# Patient Record
Sex: MALE | Race: WHITE | Employment: OTHER | URBAN - METROPOLITAN AREA
[De-identification: names, ages, dates, MRNs, and addresses within clinical notes are randomized per-mention and may not be internally consistent; named-entity substitution may affect disease eponyms.]

---

## 2017-01-05 RX ORDER — ATENOLOL AND CHLORTHALIDONE TABLET 100; 25 MG/1; MG/1
1 TABLET ORAL DAILY
Qty: 90 TABLET | Refills: 1 | Status: SHIPPED | OUTPATIENT
Start: 2017-01-05 | End: 2017-06-06 | Stop reason: SDUPTHER

## 2017-01-05 RX ORDER — LISINOPRIL 10 MG/1
10 TABLET ORAL DAILY
Qty: 90 TABLET | Refills: 1 | Status: SHIPPED | OUTPATIENT
Start: 2017-01-05 | End: 2017-06-06 | Stop reason: SDUPTHER

## 2017-01-12 ENCOUNTER — OFFICE VISIT (OUTPATIENT)
Dept: FAMILY MEDICINE CLINIC | Age: 77
End: 2017-01-12

## 2017-01-12 VITALS
HEART RATE: 59 BPM | SYSTOLIC BLOOD PRESSURE: 140 MMHG | TEMPERATURE: 98 F | OXYGEN SATURATION: 97 % | BODY MASS INDEX: 37.92 KG/M2 | DIASTOLIC BLOOD PRESSURE: 68 MMHG | WEIGHT: 256 LBS | HEIGHT: 69 IN

## 2017-01-12 DIAGNOSIS — H26.9 CATARACTS, BILATERAL: Primary | ICD-10-CM

## 2017-01-12 DIAGNOSIS — Z01.818 PRE-OP EXAM: ICD-10-CM

## 2017-01-12 PROCEDURE — 99213 OFFICE O/P EST LOW 20 MIN: CPT | Performed by: FAMILY MEDICINE

## 2017-01-14 ASSESSMENT — ENCOUNTER SYMPTOMS
ANAL BLEEDING: 0
BLOOD IN STOOL: 0
EYE DISCHARGE: 0
CONSTIPATION: 0
COUGH: 0
DIARRHEA: 0
ABDOMINAL DISTENTION: 0
ABDOMINAL PAIN: 0
SHORTNESS OF BREATH: 0
CHEST TIGHTNESS: 0

## 2017-01-18 ENCOUNTER — TELEPHONE (OUTPATIENT)
Dept: FAMILY MEDICINE CLINIC | Age: 77
End: 2017-01-18

## 2017-06-06 ENCOUNTER — OFFICE VISIT (OUTPATIENT)
Dept: FAMILY MEDICINE CLINIC | Age: 77
End: 2017-06-06

## 2017-06-06 VITALS
SYSTOLIC BLOOD PRESSURE: 134 MMHG | HEART RATE: 64 BPM | WEIGHT: 252 LBS | DIASTOLIC BLOOD PRESSURE: 76 MMHG | BODY MASS INDEX: 37.21 KG/M2 | OXYGEN SATURATION: 97 %

## 2017-06-06 DIAGNOSIS — I10 ESSENTIAL HYPERTENSION: ICD-10-CM

## 2017-06-06 DIAGNOSIS — R73.03 PRE-DIABETES: Primary | ICD-10-CM

## 2017-06-06 DIAGNOSIS — E78.5 HYPERLIPIDEMIA, UNSPECIFIED HYPERLIPIDEMIA TYPE: ICD-10-CM

## 2017-06-06 LAB
A/G RATIO: 1.6 (ref 1.1–2.2)
ALBUMIN SERPL-MCNC: 4.5 G/DL (ref 3.4–5)
ALP BLD-CCNC: 82 U/L (ref 40–129)
ALT SERPL-CCNC: 18 U/L (ref 10–40)
ANION GAP SERPL CALCULATED.3IONS-SCNC: 17 MMOL/L (ref 3–16)
AST SERPL-CCNC: 16 U/L (ref 15–37)
BILIRUB SERPL-MCNC: 1.3 MG/DL (ref 0–1)
BUN BLDV-MCNC: 15 MG/DL (ref 7–20)
CALCIUM SERPL-MCNC: 9.5 MG/DL (ref 8.3–10.6)
CHLORIDE BLD-SCNC: 96 MMOL/L (ref 99–110)
CO2: 26 MMOL/L (ref 21–32)
CREAT SERPL-MCNC: 0.6 MG/DL (ref 0.8–1.3)
GFR AFRICAN AMERICAN: >60
GFR NON-AFRICAN AMERICAN: >60
GLOBULIN: 2.9 G/DL
GLUCOSE BLD-MCNC: 105 MG/DL (ref 70–99)
POTASSIUM SERPL-SCNC: 3.7 MMOL/L (ref 3.5–5.1)
SODIUM BLD-SCNC: 139 MMOL/L (ref 136–145)
TOTAL PROTEIN: 7.4 G/DL (ref 6.4–8.2)

## 2017-06-06 PROCEDURE — G8510 SCR DEP NEG, NO PLAN REQD: HCPCS | Performed by: FAMILY MEDICINE

## 2017-06-06 PROCEDURE — 99214 OFFICE O/P EST MOD 30 MIN: CPT | Performed by: FAMILY MEDICINE

## 2017-06-06 PROCEDURE — 36415 COLL VENOUS BLD VENIPUNCTURE: CPT | Performed by: FAMILY MEDICINE

## 2017-06-06 PROCEDURE — 3288F FALL RISK ASSESSMENT DOCD: CPT | Performed by: FAMILY MEDICINE

## 2017-06-06 RX ORDER — ATORVASTATIN CALCIUM 40 MG/1
40 TABLET, FILM COATED ORAL DAILY
Qty: 30 TABLET | Refills: 6 | Status: SHIPPED | OUTPATIENT
Start: 2017-06-06 | End: 2017-07-11 | Stop reason: SDUPTHER

## 2017-06-06 RX ORDER — ATENOLOL AND CHLORTHALIDONE TABLET 100; 25 MG/1; MG/1
1 TABLET ORAL DAILY
Qty: 90 TABLET | Refills: 1 | Status: SHIPPED | OUTPATIENT
Start: 2017-06-06 | End: 2017-12-06 | Stop reason: SDUPTHER

## 2017-06-06 RX ORDER — LISINOPRIL 10 MG/1
10 TABLET ORAL DAILY
Qty: 90 TABLET | Refills: 1 | Status: SHIPPED | OUTPATIENT
Start: 2017-06-06 | End: 2017-12-06 | Stop reason: SDUPTHER

## 2017-06-06 ASSESSMENT — ENCOUNTER SYMPTOMS
COUGH: 0
DIARRHEA: 0
ANAL BLEEDING: 0
SHORTNESS OF BREATH: 0
ABDOMINAL PAIN: 0
ABDOMINAL DISTENTION: 0
EYE DISCHARGE: 0
BLOOD IN STOOL: 0
CONSTIPATION: 0
CHEST TIGHTNESS: 0

## 2017-06-06 ASSESSMENT — PATIENT HEALTH QUESTIONNAIRE - PHQ9
2. FEELING DOWN, DEPRESSED OR HOPELESS: 0
SUM OF ALL RESPONSES TO PHQ QUESTIONS 1-9: 0
SUM OF ALL RESPONSES TO PHQ9 QUESTIONS 1 & 2: 0
1. LITTLE INTEREST OR PLEASURE IN DOING THINGS: 0

## 2017-06-07 LAB
ESTIMATED AVERAGE GLUCOSE: 111.2 MG/DL
HBA1C MFR BLD: 5.5 %

## 2017-06-07 RX ORDER — LISINOPRIL 10 MG/1
TABLET ORAL
Qty: 90 TABLET | Refills: 0 | OUTPATIENT
Start: 2017-06-07

## 2017-06-12 ENCOUNTER — OFFICE VISIT (OUTPATIENT)
Dept: DERMATOLOGY | Age: 77
End: 2017-06-12

## 2017-06-12 DIAGNOSIS — L57.0 ACTINIC KERATOSIS OF RIGHT TEMPLE: Primary | ICD-10-CM

## 2017-06-12 DIAGNOSIS — L82.0 INFLAMED SEBORRHEIC KERATOSIS: ICD-10-CM

## 2017-06-12 DIAGNOSIS — Z12.83 SCREENING EXAM FOR SKIN CANCER: ICD-10-CM

## 2017-06-12 PROCEDURE — 17110 DESTRUCTION B9 LES UP TO 14: CPT | Performed by: DERMATOLOGY

## 2017-06-12 PROCEDURE — 17000 DESTRUCT PREMALG LESION: CPT | Performed by: DERMATOLOGY

## 2017-06-12 PROCEDURE — 99213 OFFICE O/P EST LOW 20 MIN: CPT | Performed by: DERMATOLOGY

## 2017-07-10 ENCOUNTER — TELEPHONE (OUTPATIENT)
Dept: FAMILY MEDICINE CLINIC | Age: 77
End: 2017-07-10

## 2017-07-11 RX ORDER — ATORVASTATIN CALCIUM 40 MG/1
40 TABLET, FILM COATED ORAL DAILY
Qty: 90 TABLET | Refills: 0 | Status: SHIPPED | OUTPATIENT
Start: 2017-07-11 | End: 2017-12-06 | Stop reason: SDUPTHER

## 2017-09-27 ENCOUNTER — HOSPITAL ENCOUNTER (OUTPATIENT)
Dept: SURGERY | Age: 77
Discharge: HOME | End: 2017-09-27
Attending: INTERNAL MEDICINE | Admitting: INTERNAL MEDICINE

## 2017-09-27 VITALS
DIASTOLIC BLOOD PRESSURE: 62 MMHG | RESPIRATION RATE: 20 BRPM | TEMPERATURE: 97.9 F | BODY MASS INDEX: 36.29 KG/M2 | HEIGHT: 69 IN | SYSTOLIC BLOOD PRESSURE: 119 MMHG | HEART RATE: 54 BPM | OXYGEN SATURATION: 98 % | WEIGHT: 245 LBS

## 2017-09-27 DIAGNOSIS — R06.02 SHORTNESS OF BREATH: ICD-10-CM

## 2017-09-27 LAB
GLUCOSE BLD-MCNC: 106 MG/DL (ref 70–99)
PERFORMED ON: ABNORMAL

## 2017-09-27 RX ORDER — SODIUM CHLORIDE, SODIUM LACTATE, POTASSIUM CHLORIDE, CALCIUM CHLORIDE 600; 310; 30; 20 MG/100ML; MG/100ML; MG/100ML; MG/100ML
INJECTION, SOLUTION INTRAVENOUS CONTINUOUS
Status: DISCONTINUED | OUTPATIENT
Start: 2017-09-27 | End: 2017-09-28 | Stop reason: HOSPADM

## 2017-09-27 RX ORDER — LIDOCAINE HYDROCHLORIDE 10 MG/ML
0.1 INJECTION, SOLUTION EPIDURAL; INFILTRATION; INTRACAUDAL; PERINEURAL ONCE
Status: DISCONTINUED | OUTPATIENT
Start: 2017-09-27 | End: 2017-09-28 | Stop reason: HOSPADM

## 2017-09-27 ASSESSMENT — PAIN SCALES - GENERAL
PAINLEVEL_OUTOF10: 0
PAINLEVEL_OUTOF10: 0

## 2017-12-06 ENCOUNTER — OFFICE VISIT (OUTPATIENT)
Dept: FAMILY MEDICINE CLINIC | Age: 77
End: 2017-12-06

## 2017-12-06 DIAGNOSIS — R73.03 PRE-DIABETES: Primary | ICD-10-CM

## 2017-12-06 DIAGNOSIS — E78.5 HYPERLIPIDEMIA, UNSPECIFIED HYPERLIPIDEMIA TYPE: ICD-10-CM

## 2017-12-06 DIAGNOSIS — Z23 NEED FOR PROPHYLACTIC VACCINATION AGAINST STREPTOCOCCUS PNEUMONIAE (PNEUMOCOCCUS): ICD-10-CM

## 2017-12-06 DIAGNOSIS — I10 ESSENTIAL HYPERTENSION: ICD-10-CM

## 2017-12-06 LAB
A/G RATIO: 1.7 (ref 1.1–2.2)
ALBUMIN SERPL-MCNC: 4.5 G/DL (ref 3.4–5)
ALP BLD-CCNC: 95 U/L (ref 40–129)
ALT SERPL-CCNC: 18 U/L (ref 10–40)
ANION GAP SERPL CALCULATED.3IONS-SCNC: 15 MMOL/L (ref 3–16)
AST SERPL-CCNC: 17 U/L (ref 15–37)
BILIRUB SERPL-MCNC: 1.3 MG/DL (ref 0–1)
BUN BLDV-MCNC: 13 MG/DL (ref 7–20)
CALCIUM SERPL-MCNC: 10 MG/DL (ref 8.3–10.6)
CHLORIDE BLD-SCNC: 96 MMOL/L (ref 99–110)
CHOLESTEROL, TOTAL: 136 MG/DL (ref 0–199)
CO2: 28 MMOL/L (ref 21–32)
CREAT SERPL-MCNC: 0.6 MG/DL (ref 0.8–1.3)
GFR AFRICAN AMERICAN: >60
GFR NON-AFRICAN AMERICAN: >60
GLOBULIN: 2.7 G/DL
GLUCOSE BLD-MCNC: 108 MG/DL (ref 70–99)
HDLC SERPL-MCNC: 41 MG/DL (ref 40–60)
LDL CHOLESTEROL CALCULATED: 71 MG/DL
POTASSIUM SERPL-SCNC: 3.8 MMOL/L (ref 3.5–5.1)
SODIUM BLD-SCNC: 139 MMOL/L (ref 136–145)
TOTAL PROTEIN: 7.2 G/DL (ref 6.4–8.2)
TRIGL SERPL-MCNC: 118 MG/DL (ref 0–150)
URIC ACID, SERUM: 7.4 MG/DL (ref 3.5–7.2)
VLDLC SERPL CALC-MCNC: 24 MG/DL

## 2017-12-06 PROCEDURE — 99214 OFFICE O/P EST MOD 30 MIN: CPT | Performed by: FAMILY MEDICINE

## 2017-12-06 PROCEDURE — 90732 PPSV23 VACC 2 YRS+ SUBQ/IM: CPT | Performed by: FAMILY MEDICINE

## 2017-12-06 PROCEDURE — 36415 COLL VENOUS BLD VENIPUNCTURE: CPT | Performed by: FAMILY MEDICINE

## 2017-12-06 PROCEDURE — G0009 ADMIN PNEUMOCOCCAL VACCINE: HCPCS | Performed by: FAMILY MEDICINE

## 2017-12-06 RX ORDER — ATENOLOL AND CHLORTHALIDONE TABLET 100; 25 MG/1; MG/1
1 TABLET ORAL DAILY
Qty: 90 TABLET | Refills: 3 | Status: SHIPPED | OUTPATIENT
Start: 2017-12-06 | End: 2018-11-20 | Stop reason: SDUPTHER

## 2017-12-06 RX ORDER — ATORVASTATIN CALCIUM 40 MG/1
40 TABLET, FILM COATED ORAL DAILY
Qty: 90 TABLET | Refills: 3 | Status: SHIPPED | OUTPATIENT
Start: 2017-12-06 | End: 2018-11-20 | Stop reason: SDUPTHER

## 2017-12-06 RX ORDER — LISINOPRIL 20 MG/1
20 TABLET ORAL DAILY
Qty: 90 TABLET | Refills: 3 | Status: SHIPPED | OUTPATIENT
Start: 2017-12-06 | End: 2018-03-07 | Stop reason: SDUPTHER

## 2017-12-07 LAB
ESTIMATED AVERAGE GLUCOSE: 119.8 MG/DL
HBA1C MFR BLD: 5.8 %

## 2017-12-08 VITALS
OXYGEN SATURATION: 97 % | BODY MASS INDEX: 37.21 KG/M2 | WEIGHT: 252 LBS | SYSTOLIC BLOOD PRESSURE: 140 MMHG | DIASTOLIC BLOOD PRESSURE: 86 MMHG | HEART RATE: 58 BPM

## 2017-12-08 ASSESSMENT — ENCOUNTER SYMPTOMS
ABDOMINAL DISTENTION: 0
BLOOD IN STOOL: 0
SHORTNESS OF BREATH: 0
CHEST TIGHTNESS: 0
COUGH: 0
ABDOMINAL PAIN: 0
DIARRHEA: 0
EYE DISCHARGE: 0
ANAL BLEEDING: 0
CONSTIPATION: 0

## 2018-01-05 ENCOUNTER — OFFICE VISIT (OUTPATIENT)
Dept: FAMILY MEDICINE CLINIC | Age: 78
End: 2018-01-05

## 2018-01-05 VITALS
WEIGHT: 251 LBS | SYSTOLIC BLOOD PRESSURE: 134 MMHG | BODY MASS INDEX: 37.07 KG/M2 | HEART RATE: 58 BPM | OXYGEN SATURATION: 97 % | DIASTOLIC BLOOD PRESSURE: 82 MMHG

## 2018-01-05 DIAGNOSIS — M1A.9XX0 CHRONIC GOUT WITHOUT TOPHUS, UNSPECIFIED CAUSE, UNSPECIFIED SITE: Primary | ICD-10-CM

## 2018-01-05 PROCEDURE — 99214 OFFICE O/P EST MOD 30 MIN: CPT | Performed by: FAMILY MEDICINE

## 2018-01-05 RX ORDER — METHYLPREDNISOLONE 4 MG/1
TABLET ORAL
Qty: 1 KIT | Refills: 0 | Status: SHIPPED | OUTPATIENT
Start: 2018-01-05 | End: 2018-01-11

## 2018-01-05 RX ORDER — ALLOPURINOL 100 MG/1
100 TABLET ORAL DAILY
Qty: 30 TABLET | Refills: 1 | Status: SHIPPED | OUTPATIENT
Start: 2018-01-05 | End: 2018-05-14 | Stop reason: ALTCHOICE

## 2018-01-05 NOTE — PROGRESS NOTES
Subjective:      Patient ID: Ignacio Nelson is a 69 y.o. male. Toe Pain    Incident onset: no incident. There was no injury mechanism. The pain is present in the left toes and right toes. The pain is moderate. The pain has been fluctuating since onset. Associated symptoms include a loss of motion. Pertinent negatives include no inability to bear weight, loss of sensation, muscle weakness, numbness or tingling. He reports no foreign bodies present. The symptoms are aggravated by movement, palpation and weight bearing. He has tried NSAIDs for the symptoms. The treatment provided significant relief.   : 69 y.o. in for a new diagnosis of gouit    Chief Complaint   Patient presents with    Gout     discuss gout treatment        Pt at the last visit had described podagra sx's that had intermittently occurred and resolved spontaneously over the last decade. Asked to be screened for gout. Uric acid was indeed elevated. In to discuss risks/benefits of treatment. Review of Systems   Constitutional: Negative for fever. Respiratory: Negative for shortness of breath. Cardiovascular: Negative for chest pain. Musculoskeletal: Positive for arthralgias and joint swelling. Negative for back pain. Neurological: Negative for tingling and numbness. Objective:   Physical Exam   Constitutional: He appears well-developed and well-nourished. No distress. HENT:   Head: Normocephalic and atraumatic. Mouth/Throat: No oropharyngeal exudate. Eyes: Conjunctivae and EOM are normal. Right eye exhibits no discharge. Left eye exhibits no discharge. No scleral icterus. Neck: Normal range of motion. Neck supple. No tracheal deviation present. No thyromegaly present. Cardiovascular: Normal rate, regular rhythm and normal heart sounds. Exam reveals no gallop and no friction rub. No murmur heard. Pulmonary/Chest: Effort normal and breath sounds normal. No respiratory distress. He has no wheezes. He has no rales.  He exhibits no tenderness. Musculoskeletal: He exhibits no edema, tenderness or deformity. (not currently having a flare-up)   Lymphadenopathy:     He has no cervical adenopathy. Skin: No rash noted. He is not diaphoretic. Nursing note and vitals reviewed. Assessment:      New diagnosis of gout      Plan:      Gout diet discussed  No beer  Limit animal purines  vegetable purines ok. Avoid certain shellfish  Etc (websites with gout diet given)    Pt asked specifically about wine. I explained that the data was not great, but it is unclear as to whether or not wine bothers gout (whereas beer is terrible for inducing flares). Limit. wine intake. Discussed the risks/benefits of allopurinol  Explained that it is safer to start with a steroid taper to avoid flare. Start medrol dose charity. Start allopurinol 100mg daily on day 3 of the steroid charity. rtc 2-3 weeks for bloodwork    John Dill was seen today for gout. Diagnoses and all orders for this visit:    Chronic gout without tophus, unspecified cause, unspecified site  -     Comprehensive Metabolic Panel; Future  -     URIC ACID; Future    Other orders  -     methylPREDNISolone (MEDROL, CHARITY,) 4 MG tablet; Take by mouth.  -     allopurinol (ZYLOPRIM) 100 MG tablet;  Take 1 tablet by mouth daily

## 2018-01-07 ASSESSMENT — ENCOUNTER SYMPTOMS
SHORTNESS OF BREATH: 0
BACK PAIN: 0

## 2018-01-29 ENCOUNTER — NURSE ONLY (OUTPATIENT)
Dept: FAMILY MEDICINE CLINIC | Age: 78
End: 2018-01-29

## 2018-01-29 DIAGNOSIS — M10.9 GOUT, UNSPECIFIED CAUSE, UNSPECIFIED CHRONICITY, UNSPECIFIED SITE: Primary | ICD-10-CM

## 2018-01-29 DIAGNOSIS — M1A.9XX0 CHRONIC GOUT WITHOUT TOPHUS, UNSPECIFIED CAUSE, UNSPECIFIED SITE: ICD-10-CM

## 2018-01-29 LAB
A/G RATIO: 1.9 (ref 1.1–2.2)
ALBUMIN SERPL-MCNC: 4.6 G/DL (ref 3.4–5)
ALP BLD-CCNC: 95 U/L (ref 40–129)
ALT SERPL-CCNC: 19 U/L (ref 10–40)
ANION GAP SERPL CALCULATED.3IONS-SCNC: 16 MMOL/L (ref 3–16)
AST SERPL-CCNC: 21 U/L (ref 15–37)
BILIRUB SERPL-MCNC: 1 MG/DL (ref 0–1)
BUN BLDV-MCNC: 13 MG/DL (ref 7–20)
CALCIUM SERPL-MCNC: 9.6 MG/DL (ref 8.3–10.6)
CHLORIDE BLD-SCNC: 98 MMOL/L (ref 99–110)
CO2: 28 MMOL/L (ref 21–32)
CREAT SERPL-MCNC: 0.6 MG/DL (ref 0.8–1.3)
GFR AFRICAN AMERICAN: >60
GFR NON-AFRICAN AMERICAN: >60
GLOBULIN: 2.4 G/DL
GLUCOSE BLD-MCNC: 111 MG/DL (ref 70–99)
POTASSIUM SERPL-SCNC: 4.2 MMOL/L (ref 3.5–5.1)
SODIUM BLD-SCNC: 142 MMOL/L (ref 136–145)
TOTAL PROTEIN: 7 G/DL (ref 6.4–8.2)
URIC ACID, SERUM: 6.7 MG/DL (ref 3.5–7.2)

## 2018-01-29 PROCEDURE — 36415 COLL VENOUS BLD VENIPUNCTURE: CPT | Performed by: FAMILY MEDICINE

## 2018-03-07 ENCOUNTER — TELEPHONE (OUTPATIENT)
Dept: FAMILY MEDICINE CLINIC | Age: 78
End: 2018-03-07

## 2018-03-07 RX ORDER — LISINOPRIL 20 MG/1
20 TABLET ORAL DAILY
Qty: 90 TABLET | Refills: 3 | Status: SHIPPED | OUTPATIENT
Start: 2018-03-07 | End: 2018-11-20 | Stop reason: SDUPTHER

## 2018-05-14 ENCOUNTER — OFFICE VISIT (OUTPATIENT)
Dept: FAMILY MEDICINE CLINIC | Age: 78
End: 2018-05-14

## 2018-05-14 VITALS
DIASTOLIC BLOOD PRESSURE: 72 MMHG | WEIGHT: 259 LBS | SYSTOLIC BLOOD PRESSURE: 130 MMHG | BODY MASS INDEX: 38.36 KG/M2 | HEART RATE: 62 BPM | HEIGHT: 69 IN | OXYGEN SATURATION: 98 %

## 2018-05-14 DIAGNOSIS — I10 ESSENTIAL HYPERTENSION: ICD-10-CM

## 2018-05-14 DIAGNOSIS — R73.03 PRE-DIABETES: Primary | ICD-10-CM

## 2018-05-14 DIAGNOSIS — E78.2 MIXED HYPERLIPIDEMIA: ICD-10-CM

## 2018-05-14 LAB — HBA1C MFR BLD: 5.7 %

## 2018-05-14 PROCEDURE — 83036 HEMOGLOBIN GLYCOSYLATED A1C: CPT | Performed by: FAMILY MEDICINE

## 2018-05-14 PROCEDURE — 99214 OFFICE O/P EST MOD 30 MIN: CPT | Performed by: FAMILY MEDICINE

## 2018-06-18 ENCOUNTER — OFFICE VISIT (OUTPATIENT)
Dept: DERMATOLOGY | Age: 78
End: 2018-06-18

## 2018-06-18 DIAGNOSIS — Z12.83 SCREENING EXAM FOR SKIN CANCER: ICD-10-CM

## 2018-06-18 DIAGNOSIS — D48.5 NEOPLASM OF UNCERTAIN BEHAVIOR OF SKIN: Primary | ICD-10-CM

## 2018-06-18 DIAGNOSIS — L57.0 ACTINIC KERATOSES: ICD-10-CM

## 2018-06-18 PROCEDURE — 17003 DESTRUCT PREMALG LES 2-14: CPT | Performed by: DERMATOLOGY

## 2018-06-18 PROCEDURE — 99213 OFFICE O/P EST LOW 20 MIN: CPT | Performed by: DERMATOLOGY

## 2018-06-18 PROCEDURE — 17000 DESTRUCT PREMALG LESION: CPT | Performed by: DERMATOLOGY

## 2018-06-18 PROCEDURE — 11100 PR BIOPSY OF SKIN LESION: CPT | Performed by: DERMATOLOGY

## 2018-06-20 ENCOUNTER — TELEPHONE (OUTPATIENT)
Dept: DERMATOLOGY | Age: 78
End: 2018-06-20

## 2018-11-20 ENCOUNTER — OFFICE VISIT (OUTPATIENT)
Dept: FAMILY MEDICINE CLINIC | Age: 78
End: 2018-11-20
Payer: MEDICARE

## 2018-11-20 ENCOUNTER — TELEPHONE (OUTPATIENT)
Dept: FAMILY MEDICINE CLINIC | Age: 78
End: 2018-11-20

## 2018-11-20 VITALS
WEIGHT: 251 LBS | BODY MASS INDEX: 37.07 KG/M2 | OXYGEN SATURATION: 98 % | DIASTOLIC BLOOD PRESSURE: 82 MMHG | SYSTOLIC BLOOD PRESSURE: 126 MMHG | HEART RATE: 60 BPM

## 2018-11-20 DIAGNOSIS — Z00.00 ROUTINE GENERAL MEDICAL EXAMINATION AT A HEALTH CARE FACILITY: Primary | ICD-10-CM

## 2018-11-20 DIAGNOSIS — R73.03 PRE-DIABETES: ICD-10-CM

## 2018-11-20 DIAGNOSIS — E78.2 MIXED HYPERLIPIDEMIA: ICD-10-CM

## 2018-11-20 DIAGNOSIS — I10 ESSENTIAL HYPERTENSION: ICD-10-CM

## 2018-11-20 LAB
A/G RATIO: 1.6 (ref 1.1–2.2)
ALBUMIN SERPL-MCNC: 4.4 G/DL (ref 3.4–5)
ALP BLD-CCNC: 90 U/L (ref 40–129)
ALT SERPL-CCNC: 23 U/L (ref 10–40)
ANION GAP SERPL CALCULATED.3IONS-SCNC: 13 MMOL/L (ref 3–16)
AST SERPL-CCNC: 22 U/L (ref 15–37)
BILIRUB SERPL-MCNC: 1.3 MG/DL (ref 0–1)
BUN BLDV-MCNC: 11 MG/DL (ref 7–20)
CALCIUM SERPL-MCNC: 10.2 MG/DL (ref 8.3–10.6)
CHLORIDE BLD-SCNC: 95 MMOL/L (ref 99–110)
CO2: 28 MMOL/L (ref 21–32)
CREAT SERPL-MCNC: 0.6 MG/DL (ref 0.8–1.3)
GFR AFRICAN AMERICAN: >60
GFR NON-AFRICAN AMERICAN: >60
GLOBULIN: 2.7 G/DL
GLUCOSE BLD-MCNC: 101 MG/DL (ref 70–99)
HBA1C MFR BLD: 5.5 %
POTASSIUM SERPL-SCNC: 3.8 MMOL/L (ref 3.5–5.1)
SODIUM BLD-SCNC: 136 MMOL/L (ref 136–145)
TOTAL PROTEIN: 7.1 G/DL (ref 6.4–8.2)

## 2018-11-20 PROCEDURE — 83036 HEMOGLOBIN GLYCOSYLATED A1C: CPT | Performed by: FAMILY MEDICINE

## 2018-11-20 PROCEDURE — 99214 OFFICE O/P EST MOD 30 MIN: CPT | Performed by: FAMILY MEDICINE

## 2018-11-20 PROCEDURE — G0439 PPPS, SUBSEQ VISIT: HCPCS | Performed by: FAMILY MEDICINE

## 2018-11-20 RX ORDER — ATORVASTATIN CALCIUM 40 MG/1
40 TABLET, FILM COATED ORAL DAILY
Qty: 90 TABLET | Refills: 3 | Status: SHIPPED | OUTPATIENT
Start: 2018-11-20 | End: 2019-11-19 | Stop reason: SDUPTHER

## 2018-11-20 RX ORDER — ATENOLOL AND CHLORTHALIDONE TABLET 100; 25 MG/1; MG/1
1 TABLET ORAL DAILY
Qty: 90 TABLET | Refills: 3 | Status: SHIPPED | OUTPATIENT
Start: 2018-11-20 | End: 2019-11-19 | Stop reason: SDUPTHER

## 2018-11-20 RX ORDER — LISINOPRIL 20 MG/1
20 TABLET ORAL DAILY
Qty: 90 TABLET | Refills: 3 | Status: SHIPPED | OUTPATIENT
Start: 2018-11-20 | End: 2019-11-19 | Stop reason: SDUPTHER

## 2018-11-20 ASSESSMENT — PATIENT HEALTH QUESTIONNAIRE - PHQ9
SUM OF ALL RESPONSES TO PHQ QUESTIONS 1-9: 0
SUM OF ALL RESPONSES TO PHQ QUESTIONS 1-9: 0

## 2018-11-20 ASSESSMENT — LIFESTYLE VARIABLES
HOW OFTEN DO YOU HAVE A DRINK CONTAINING ALCOHOL: 4
HOW MANY STANDARD DRINKS CONTAINING ALCOHOL DO YOU HAVE ON A TYPICAL DAY: 1
AUDIT-C TOTAL SCORE: 7
HOW OFTEN DO YOU HAVE SIX OR MORE DRINKS ON ONE OCCASION: 2

## 2018-11-20 ASSESSMENT — ANXIETY QUESTIONNAIRES: GAD7 TOTAL SCORE: 0

## 2018-11-20 NOTE — PATIENT INSTRUCTIONS
Learning About Low-Carbohydrate Diets for Weight Loss  What is a low-carbohydrate diet? Low-carb diets avoid foods that are high in carbohydrate. These high-carb foods include pasta, bread, rice, cereal, fruits, and starchy vegetables. Instead, these diets usually have you eat foods that are high in fat and protein. Many people lose weight quickly on a low-carb diet. But the early weight loss is water. People on this diet often gain the weight back after they start eating carbs again. Not all diet plans are safe or work well. A lot of the evidence shows that low-carb diets aren't healthy. That's because these diets often don't include healthy foods like fruits and vegetables. Losing weight safely means balancing protein, fat, and carbs with every meal and snack. And low-carb diets don't always provide the vitamins, minerals, and fiber you need. If you have a serious medical condition, talk to your doctor before you try any diet. These conditions include kidney disease, heart disease, type 2 diabetes, high cholesterol, and high blood pressure. If you are pregnant, it may not be safe for your baby if you are on a low-carb diet. How can you lose weight safely? You might have heard that a diet plan helped another person lose weight. But that doesn't mean that it will work for you. It is very hard to stay on a diet that includes lots of big changes in your eating habits. If you want to get to a healthy weight and stay there, making healthy lifestyle changes will often work better than dieting. These steps can help. · Make a plan for change. Work with your doctor to create a plan that is right for you. · See a dietitian. He or she can show you how to make healthy changes in your eating habits. · Manage stress. If you have a lot of stress in your life, it can be hard to focus on making healthy changes to your daily habits. · Track your food and activity.  You are likely to do better at losing weight if you keep track of what you eat and what you do. Follow-up care is a key part of your treatment and safety. Be sure to make and go to all appointments, and call your doctor if you are having problems. It's also a good idea to know your test results and keep a list of the medicines you take. Where can you learn more? Go to https://chpepiceweb.[employer]. org and sign in to your [employer] account. Enter A121 in the [employer] box to learn more about \"Learning About Low-Carbohydrate Diets for Weight Loss. \"     If you do not have an account, please click on the \"Sign Up Now\" link. Current as of: March 29, 2018  Content Version: 11.8  © 7851-3962 Healthwise, Incorporated. Care instructions adapted under license by [facility] ([facility]). If you have questions about a medical condition or this instruction, always ask your healthcare professional. [place] [address] any warranty or liability for your use of this information. Personalized Preventive Plan for Jeffrey Reed - 11/20/2018  Medicare offers a range of preventive health benefits. Some of the tests and screenings are paid in full while other may be subject to a deductible, co-insurance, and/or copay. Some of these benefits include a comprehensive review of your medical history including lifestyle, illnesses that may run in your family, and various assessments and screenings as appropriate. After reviewing your medical record and screening and assessments performed today your provider may have ordered immunizations, labs, imaging, and/or referrals for you. A list of these orders (if applicable) as well as your Preventive Care list are included within your After Visit Summary for your review. Other Preventive Recommendations:    · A preventive eye exam performed by an eye specialist is recommended every 1-2 years to screen for glaucoma; cataracts, macular degeneration, and other eye disorders.   · A preventive dental visit is stretch    Stand on a step as shown above. Be sure to hold on to the banister. Slowly let your heels down over the edge of the step as you relax your calf muscles. You should feel a gentle stretch across the bottom of your foot and up the back of your leg to your knee. Hold the stretch about 15 to 30 seconds, and then tighten your calf muscle a little to bring your heel back up to the level of the step. Repeat 2 to 4 times. Towel curls    While sitting, place your foot on a towel on the floor and scrunch the towel toward you with your toes. Then, also using your toes, push the towel away from you. [place] pickups    Put marbles on the floor next to a cup. Using your toes, try to lift the marbles up from the floor and put them in the cup. Follow-up care is a key part of your treatment and safety. Be sure to make and go to all appointments, and call your doctor if you are having problems. It's also a good idea to know your test results and keep a list of the medicines you take. Where can you learn more? Go to https://[employer] AMDpeAdsIt.[employer]. org and sign in to your [email or web address] account. Enter D027 in the [employer] box to learn more about \"Plantar Fasciitis: Exercises. \"     If you do not have an account, please click on the \"Sign Up Now\" link. Current as of: November 29, 2017  Content Version: 11.8  © 6290-1045 Healthwise, Incorporated. Care instructions adapted under license by [facility] ([facility]). If you have questions about a medical condition or this instruction, always ask your healthcare professional. [place] [address] any warranty or liability for your use of this information.

## 2018-11-20 NOTE — PROGRESS NOTES
Medicare Annual Wellness Visit  Name: Karen Golden Date: 2018   MRN: [id number] Sex: Male   Age: 69 y.o. Ethnicity: Non-/Non    : 1940 Race: White      Anup Porras is here for Diabetes and Medicare AWV    Screenings for behavioral, psychosocial and functional/safety risks, and cognitive dysfunction are all negative except as indicated below. These results, as well as other patient data from the [address] form, are documented in Flowsheets linked to this Encounter. Allergies   Allergen Reactions    Pcn [Penicillins] Hives     Prior to Visit Medications    Medication Sig Taking? Authorizing Provider   lisinopril (PRINIVIL;ZESTRIL) 20 MG tablet Take 1 tablet by mouth daily Yes GORDON Jeronimo - CNP   atenolol-chlorthalidone (TENORETIC) 100-25 MG per tablet Take 1 tablet by mouth daily Yes Minna Cavanaugh MD   atorvastatin (LIPITOR) 40 MG tablet Take 1 tablet by mouth daily Yes Minna Cavanaugh MD   metFORMIN (GLUCOPHAGE) 500 MG tablet Take 1 tablet by mouth 2 times daily (with meals) Yes Minna Cavanaugh MD   Multiple Vitamins-Minerals (THERAPEUTIC MULTIVITAMIN-MINERALS) tablet Take 1 tablet by mouth daily Yes Historical Provider, MD   aspirin 81 MG tablet Take 81 mg by mouth daily.  Yes Historical Provider, MD     Past Medical History:   Diagnosis Date    Arthritis     Chronic gout without tophus 2018    Diabetes mellitus ([address]     ED (erectile dysfunction)     Erectile dysfunction 2016    Hyperlipidemia     Hypertension     Nocturia     Pre-diabetes     Prostate CA ([address]     seeds implanted approx      Past Surgical History:   Procedure Laterality Date    CATARACT REMOVAL Bilateral 2017    COLONOSCOPY  14    multiple polyp    JOINT REPLACEMENT Left 2015    left total knee replacement    KNEE ARTHROSCOPY      left knee    KNEE SURGERY Left 1963    PROSTATE SURGERY      TONSILLECTOMY       Family History

## 2019-05-24 ENCOUNTER — OFFICE VISIT (OUTPATIENT)
Dept: FAMILY MEDICINE CLINIC | Age: 79
End: 2019-05-24
Payer: MEDICARE

## 2019-05-24 VITALS
TEMPERATURE: 98.2 F | RESPIRATION RATE: 20 BRPM | SYSTOLIC BLOOD PRESSURE: 136 MMHG | BODY MASS INDEX: 36.58 KG/M2 | OXYGEN SATURATION: 92 % | WEIGHT: 247 LBS | HEART RATE: 60 BPM | HEIGHT: 69 IN | DIASTOLIC BLOOD PRESSURE: 70 MMHG

## 2019-05-24 DIAGNOSIS — E78.2 MIXED HYPERLIPIDEMIA: ICD-10-CM

## 2019-05-24 DIAGNOSIS — R73.03 PRE-DIABETES: Primary | ICD-10-CM

## 2019-05-24 DIAGNOSIS — R20.0 NUMBNESS AND TINGLING OF LEFT UPPER EXTREMITY: ICD-10-CM

## 2019-05-24 DIAGNOSIS — I10 ESSENTIAL HYPERTENSION: ICD-10-CM

## 2019-05-24 DIAGNOSIS — R20.2 NUMBNESS AND TINGLING OF LEFT UPPER EXTREMITY: ICD-10-CM

## 2019-05-24 LAB — HBA1C MFR BLD: 5.4 %

## 2019-05-24 PROCEDURE — 83036 HEMOGLOBIN GLYCOSYLATED A1C: CPT | Performed by: FAMILY MEDICINE

## 2019-05-24 PROCEDURE — 99214 OFFICE O/P EST MOD 30 MIN: CPT | Performed by: FAMILY MEDICINE

## 2019-05-24 PROCEDURE — G8510 SCR DEP NEG, NO PLAN REQD: HCPCS | Performed by: FAMILY MEDICINE

## 2019-05-24 ASSESSMENT — PATIENT HEALTH QUESTIONNAIRE - PHQ9
1. LITTLE INTEREST OR PLEASURE IN DOING THINGS: 0
SUM OF ALL RESPONSES TO PHQ9 QUESTIONS 1 & 2: 0
SUM OF ALL RESPONSES TO PHQ QUESTIONS 1-9: 0
2. FEELING DOWN, DEPRESSED OR HOPELESS: 0
SUM OF ALL RESPONSES TO PHQ QUESTIONS 1-9: 0

## 2019-05-24 NOTE — PROGRESS NOTES
Outpatient Medications   Medication Sig Dispense Refill    atenolol-chlorthalidone (TENORETIC) 100-25 MG per tablet Take 1 tablet by mouth daily 90 tablet 3    atorvastatin (LIPITOR) 40 MG tablet Take 1 tablet by mouth daily 90 tablet 3    lisinopril (PRINIVIL;ZESTRIL) 20 MG tablet Take 1 tablet by mouth daily 90 tablet 3    metFORMIN (GLUCOPHAGE) 500 MG tablet Take 1 tablet by mouth 2 times daily (with meals) 180 tablet 3    Multiple Vitamins-Minerals (THERAPEUTIC MULTIVITAMIN-MINERALS) tablet Take 1 tablet by mouth daily      aspirin 81 MG tablet Take 81 mg by mouth daily. No current facility-administered medications for this visit. Assessment:    1. Pre-diabetes    2. Essential hypertension    3. Mixed hyperlipidemia    4. Numbness and tingling of left upper extremity        Plan:    1. Pre-diabetes  Stable. Continue current medications. - POCT glycosylated hemoglobin (Hb A1C) 5.4    2. Essential hypertension  Stable. Continue current medications. 3. Mixed hyperlipidemia  Stable. Continue current medications. 4. Numbness and tingling of left upper extremity  Check EMG.   - EMG; Future      Return in about 6 months (around 11/24/2019) for medicare annual (dm, htn, hld) COPAY.

## 2019-06-03 ENCOUNTER — HOSPITAL ENCOUNTER (OUTPATIENT)
Dept: NEUROLOGY | Age: 79
Discharge: HOME | End: 2019-06-03
Payer: MEDICARE

## 2019-06-03 DIAGNOSIS — R20.0 NUMBNESS AND TINGLING OF LEFT UPPER EXTREMITY: ICD-10-CM

## 2019-06-03 DIAGNOSIS — R20.2 NUMBNESS AND TINGLING OF LEFT UPPER EXTREMITY: ICD-10-CM

## 2019-06-03 PROCEDURE — 95908 NRV CNDJ TST 3-4 STUDIES: CPT | Performed by: PHYSICAL MEDICINE & REHABILITATION

## 2019-06-03 PROCEDURE — 95886 MUSC TEST DONE W/N TEST COMP: CPT | Performed by: PHYSICAL MEDICINE & REHABILITATION

## 2019-06-03 NOTE — PROCEDURES
[address]      Electrodiagnostic Report  Test Date:  6/3/2019    Patient: Daina Rodriguez : 1940 Physician: Nato Ware D.O.   Sex: Male Height:   Ref Phys: Haim Martinez DO     Patient Complaints:  Patient is a 69year-old male who presents with left upper extremity paresthesia for past 6 months intermittent in nature. Patient History / Exam:  PMH: + diabetes managed with oral meds. No neck or arm surgery PE:  reflexes trace, + thumb opposition weakness    Impression: Study is consistent with left carpal tunnel syndrome, moderate severity. No evidence of an acute radiculopathy or other entrapment neuropathy. Thank you. NCV & EMG Findings:  Evaluation of the left median motor nerve showed prolonged distal onset latency (5.6 ms) and reduced amplitude (1.7 mV). The left median sensory nerve showed prolonged distal peak latency (4.4 ms) and reduced amplitude (8.0 µV). The left ulnar sensory nerve showed reduced amplitude (11.0 µV). All remaining nerves (as indicated in the following tables) were within normal limits. All examined muscles (as indicated in the following table) showed no evidence of electrical instability.                   Nato Ware D.O.        Nerve Conduction Studies  Anti Sensory Summary Table     Stim Site NR Peak (ms) Norm Peak (ms) P-T Amp (µV) Norm P-T Amp Site1 Site2 Delta-P (ms) Dist (cm) Thomas (m/s) Norm Thomas (m/s)   Left Median Anti Sensory (2nd Digit)   Wrist    4.4 <3.6 8.0 >10 Wrist 2nd Digit 4.4 14.0 32    Left Ulnar Anti Sensory (5th Digit)   Wrist    3.5 <3.7 11.0 >15.0 Wrist 5th Digit 3.5 14.0 40      Motor Summary Table     Stim Site NR Onset (ms) Norm Onset (ms) O-P Amp (mV) Norm O-P Amp Site1 Site2 Delta-0 (ms) Dist (cm) Thomas (m/s) Norm Thomas (m/s)   Left Median Motor (Abd Poll Brev)   Wrist    5.6 <4.3 1.7 >5 Elbow Wrist 4.0 22.0 55 >50   Elbow    9.6  1.5          Left Ulnar Motor (Abd Dig Minimi)   Wrist    3.3 <4.2 5.1 >3 B Elbow Wrist 3.6 23.0 64 >50   B Elbow    6.9  4.7  A Elbow B Elbow 0.8 6.0 75 >50   A Elbow    7.7  4.3            EMG     Side Muscle Nerve Root Ins Act Fibs Psw Amp Dur Poly Recrt Int Antonietta Wilsons Comment   Left Deltoid Axillary C5-6 Nml Nml Nml Nml Nml 0 Nml Nml    Left Biceps Musculocut C5-6 Nml Nml Nml Nml Nml 0 Nml Nml    Left Triceps Radial C6-7-8 Nml Nml Nml Nml Nml 0 Nml Nml    Left BrachioRad Radial C5-6 Nml Nml Nml Nml Nml 0 Nml Nml    Left PronatorTeres Median C6-7 Nml Nml Nml Nml Nml 0 Nml Nml    Left Ext Indicis Radial (Post Int) C7-8 Nml Nml Nml Nml Nml 0 Nml Nml    Left 1stDorInt Ulnar C8-T1 Nml Nml Nml Nml Nml 0 Nml Nml    Left Abd Poll Brev Median C8-T1 Nml Nml Nml Nml Nml 0 Nml Nml    Left Cervical Parasp Up Rami C1-3 Nml Nml Nml         Left Cervical Parasp Mid Rami C4-6 Nml Nml Nml         Left Cervical Parasp Low Rami C7-8 Nml Nml Nml           Electronically signed by Margareth Marin DO on 6/3/2019 at 2:34 PM

## 2019-11-19 ENCOUNTER — OFFICE VISIT (OUTPATIENT)
Dept: FAMILY MEDICINE CLINIC | Age: 79
End: 2019-11-19
Payer: MEDICARE

## 2019-11-19 VITALS
OXYGEN SATURATION: 96 % | DIASTOLIC BLOOD PRESSURE: 62 MMHG | WEIGHT: 248 LBS | BODY MASS INDEX: 36.62 KG/M2 | HEART RATE: 56 BPM | SYSTOLIC BLOOD PRESSURE: 134 MMHG

## 2019-11-19 DIAGNOSIS — I10 ESSENTIAL HYPERTENSION: ICD-10-CM

## 2019-11-19 DIAGNOSIS — R73.03 PRE-DIABETES: Primary | ICD-10-CM

## 2019-11-19 DIAGNOSIS — E78.2 MIXED HYPERLIPIDEMIA: ICD-10-CM

## 2019-11-19 LAB
A/G RATIO: 1.9 (ref 1.1–2.2)
ALBUMIN SERPL-MCNC: 4.6 G/DL (ref 3.4–5)
ALP BLD-CCNC: 91 U/L (ref 40–129)
ALT SERPL-CCNC: 21 U/L (ref 10–40)
ANION GAP SERPL CALCULATED.3IONS-SCNC: 15 MMOL/L (ref 3–16)
AST SERPL-CCNC: 18 U/L (ref 15–37)
BASOPHILS ABSOLUTE: 0 K/UL (ref 0–0.2)
BASOPHILS RELATIVE PERCENT: 0.6 %
BILIRUB SERPL-MCNC: 1.1 MG/DL (ref 0–1)
BUN BLDV-MCNC: 12 MG/DL (ref 7–20)
CALCIUM SERPL-MCNC: 9.9 MG/DL (ref 8.3–10.6)
CHLORIDE BLD-SCNC: 96 MMOL/L (ref 99–110)
CHOLESTEROL, TOTAL: 136 MG/DL (ref 0–199)
CO2: 25 MMOL/L (ref 21–32)
CREAT SERPL-MCNC: 0.6 MG/DL (ref 0.8–1.3)
EOSINOPHILS ABSOLUTE: 0.3 K/UL (ref 0–0.6)
EOSINOPHILS RELATIVE PERCENT: 4.9 %
GFR AFRICAN AMERICAN: >60
GFR NON-AFRICAN AMERICAN: >60
GLOBULIN: 2.4 G/DL
GLUCOSE BLD-MCNC: 112 MG/DL (ref 70–99)
HBA1C MFR BLD: 5.4 %
HCT VFR BLD CALC: 38.6 % (ref 40.5–52.5)
HDLC SERPL-MCNC: 54 MG/DL (ref 40–60)
HEMOGLOBIN: 13 G/DL (ref 13.5–17.5)
LDL CHOLESTEROL CALCULATED: 52 MG/DL
LYMPHOCYTES ABSOLUTE: 1.4 K/UL (ref 1–5.1)
LYMPHOCYTES RELATIVE PERCENT: 20.4 %
MCH RBC QN AUTO: 28.8 PG (ref 26–34)
MCHC RBC AUTO-ENTMCNC: 33.7 G/DL (ref 31–36)
MCV RBC AUTO: 85.6 FL (ref 80–100)
MONOCYTES ABSOLUTE: 0.4 K/UL (ref 0–1.3)
MONOCYTES RELATIVE PERCENT: 5.4 %
NEUTROPHILS ABSOLUTE: 4.8 K/UL (ref 1.7–7.7)
NEUTROPHILS RELATIVE PERCENT: 68.7 %
PDW BLD-RTO: 15.2 % (ref 12.4–15.4)
PLATELET # BLD: 267 K/UL (ref 135–450)
PMV BLD AUTO: 8.1 FL (ref 5–10.5)
POTASSIUM SERPL-SCNC: 3.9 MMOL/L (ref 3.5–5.1)
RBC # BLD: 4.51 M/UL (ref 4.2–5.9)
SODIUM BLD-SCNC: 136 MMOL/L (ref 136–145)
TOTAL PROTEIN: 7 G/DL (ref 6.4–8.2)
TRIGL SERPL-MCNC: 148 MG/DL (ref 0–150)
VLDLC SERPL CALC-MCNC: 30 MG/DL
WBC # BLD: 7 K/UL (ref 4–11)

## 2019-11-19 PROCEDURE — 83036 HEMOGLOBIN GLYCOSYLATED A1C: CPT | Performed by: FAMILY MEDICINE

## 2019-11-19 PROCEDURE — 99214 OFFICE O/P EST MOD 30 MIN: CPT | Performed by: FAMILY MEDICINE

## 2019-11-19 RX ORDER — LISINOPRIL 20 MG/1
20 TABLET ORAL DAILY
Qty: 90 TABLET | Refills: 3 | Status: SHIPPED | OUTPATIENT
Start: 2019-11-19 | End: 2020-08-14

## 2019-11-19 RX ORDER — ATENOLOL AND CHLORTHALIDONE TABLET 100; 25 MG/1; MG/1
1 TABLET ORAL DAILY
Qty: 90 TABLET | Refills: 3 | Status: SHIPPED | OUTPATIENT
Start: 2019-11-19 | End: 2020-08-17 | Stop reason: SDUPTHER

## 2019-11-19 RX ORDER — ATORVASTATIN CALCIUM 40 MG/1
40 TABLET, FILM COATED ORAL DAILY
Qty: 90 TABLET | Refills: 3 | Status: SHIPPED | OUTPATIENT
Start: 2019-11-19 | End: 2020-08-17 | Stop reason: SDUPTHER

## 2020-01-02 ENCOUNTER — TELEPHONE (OUTPATIENT)
Dept: DERMATOLOGY | Age: 80
End: 2020-01-02

## 2020-01-02 NOTE — TELEPHONE ENCOUNTER
Patient LVM to cancel 1- appointment. Tried to call back and had LVM to have him return the call to r/s.

## 2020-01-06 ENCOUNTER — TELEPHONE (OUTPATIENT)
Dept: ADMINISTRATIVE | Age: 80
End: 2020-01-06

## 2020-01-06 NOTE — TELEPHONE ENCOUNTER
Pt called in to let  know he received call from [facility] and was infornmed he has Afib. Pt has been scheduled, 1/9.  Please call pt back if another appt, sooner, is needed

## 2020-01-07 NOTE — TELEPHONE ENCOUNTER
Yes, ideally he would be need to be seen sooner. He should definitely start an aspirin to help thin his blood, if he is not taking one already. We usually recommend seeing cardiology as well for new onset atrial fibrillation. I will go ahead and place that referral (number is in referrals). Patient should try to check his pulse. If it is above 110, he may have to go to the ER.

## 2020-01-09 ENCOUNTER — TELEPHONE (OUTPATIENT)
Dept: FAMILY MEDICINE CLINIC | Age: 80
End: 2020-01-09

## 2020-01-09 ENCOUNTER — OFFICE VISIT (OUTPATIENT)
Dept: FAMILY MEDICINE CLINIC | Age: 80
End: 2020-01-09
Payer: MEDICARE

## 2020-01-09 VITALS — SYSTOLIC BLOOD PRESSURE: 144 MMHG | OXYGEN SATURATION: 98 % | DIASTOLIC BLOOD PRESSURE: 68 MMHG | HEART RATE: 60 BPM

## 2020-01-09 LAB
A/G RATIO: 1.6 (ref 1.1–2.2)
ALBUMIN SERPL-MCNC: 4.4 G/DL (ref 3.4–5)
ALP BLD-CCNC: 92 U/L (ref 40–129)
ALT SERPL-CCNC: 22 U/L (ref 10–40)
ANION GAP SERPL CALCULATED.3IONS-SCNC: 17 MMOL/L (ref 3–16)
AST SERPL-CCNC: 20 U/L (ref 15–37)
BASOPHILS ABSOLUTE: 0.1 K/UL (ref 0–0.2)
BASOPHILS RELATIVE PERCENT: 0.8 %
BILIRUB SERPL-MCNC: 1.3 MG/DL (ref 0–1)
BUN BLDV-MCNC: 11 MG/DL (ref 7–20)
CALCIUM SERPL-MCNC: 10.1 MG/DL (ref 8.3–10.6)
CHLORIDE BLD-SCNC: 93 MMOL/L (ref 99–110)
CO2: 26 MMOL/L (ref 21–32)
CREAT SERPL-MCNC: 0.6 MG/DL (ref 0.8–1.3)
EOSINOPHILS ABSOLUTE: 0.4 K/UL (ref 0–0.6)
EOSINOPHILS RELATIVE PERCENT: 5.9 %
GFR AFRICAN AMERICAN: >60
GFR NON-AFRICAN AMERICAN: >60
GLOBULIN: 2.8 G/DL
GLUCOSE BLD-MCNC: 107 MG/DL (ref 70–99)
HCT VFR BLD CALC: 40.6 % (ref 40.5–52.5)
HEMOGLOBIN: 13.6 G/DL (ref 13.5–17.5)
LYMPHOCYTES ABSOLUTE: 1.5 K/UL (ref 1–5.1)
LYMPHOCYTES RELATIVE PERCENT: 21.9 %
MCH RBC QN AUTO: 28.7 PG (ref 26–34)
MCHC RBC AUTO-ENTMCNC: 33.4 G/DL (ref 31–36)
MCV RBC AUTO: 85.9 FL (ref 80–100)
MONOCYTES ABSOLUTE: 0.4 K/UL (ref 0–1.3)
MONOCYTES RELATIVE PERCENT: 6.1 %
NEUTROPHILS ABSOLUTE: 4.4 K/UL (ref 1.7–7.7)
NEUTROPHILS RELATIVE PERCENT: 65.3 %
PDW BLD-RTO: 15.1 % (ref 12.4–15.4)
PLATELET # BLD: 257 K/UL (ref 135–450)
PMV BLD AUTO: 8.3 FL (ref 5–10.5)
POTASSIUM SERPL-SCNC: 4 MMOL/L (ref 3.5–5.1)
RBC # BLD: 4.73 M/UL (ref 4.2–5.9)
SODIUM BLD-SCNC: 136 MMOL/L (ref 136–145)
TOTAL PROTEIN: 7.2 G/DL (ref 6.4–8.2)
TSH REFLEX: 3.21 UIU/ML (ref 0.27–4.2)
WBC # BLD: 6.7 K/UL (ref 4–11)

## 2020-01-09 PROCEDURE — 93000 ELECTROCARDIOGRAM COMPLETE: CPT | Performed by: FAMILY MEDICINE

## 2020-01-09 PROCEDURE — 99214 OFFICE O/P EST MOD 30 MIN: CPT | Performed by: FAMILY MEDICINE

## 2020-01-09 PROCEDURE — G8510 SCR DEP NEG, NO PLAN REQD: HCPCS | Performed by: FAMILY MEDICINE

## 2020-01-09 ASSESSMENT — PATIENT HEALTH QUESTIONNAIRE - PHQ9
1. LITTLE INTEREST OR PLEASURE IN DOING THINGS: 0
SUM OF ALL RESPONSES TO PHQ QUESTIONS 1-9: 0
SUM OF ALL RESPONSES TO PHQ QUESTIONS 1-9: 0
SUM OF ALL RESPONSES TO PHQ9 QUESTIONS 1 & 2: 0
2. FEELING DOWN, DEPRESSED OR HOPELESS: 0

## 2020-01-09 NOTE — TELEPHONE ENCOUNTER
Hi Dr. Cristian Gallo,    This patient is establishing with you next week. [place] [facility] [place] was found to have an abnormal EKG that showed atrial fibrillation. No history of atrial fibrillation. He is completely asymptomatic. No history of stroke. I was wondering could please take a look at his EKG and see if you think it looks like he has any atrial fibrillation. The computer did not think so but it does appear to me that he might have atrial fibrillation. He is currently on aspirin. Thank you for your help.

## 2020-01-10 ENCOUNTER — HOSPITAL ENCOUNTER (OUTPATIENT)
Dept: CARDIOLOGY | Age: 80
Discharge: HOME | End: 2020-01-10
Payer: MEDICARE

## 2020-01-10 LAB
LV EF: 58 %
LVEF MODALITY: NORMAL

## 2020-01-10 PROCEDURE — 93306 TTE W/DOPPLER COMPLETE: CPT

## 2020-01-15 ENCOUNTER — OFFICE VISIT (OUTPATIENT)
Dept: CARDIOLOGY CLINIC | Age: 80
End: 2020-01-15
Payer: MEDICARE

## 2020-01-15 VITALS
SYSTOLIC BLOOD PRESSURE: 116 MMHG | OXYGEN SATURATION: 98 % | HEART RATE: 67 BPM | HEIGHT: 69 IN | WEIGHT: 252.4 LBS | DIASTOLIC BLOOD PRESSURE: 54 MMHG | BODY MASS INDEX: 37.38 KG/M2

## 2020-01-15 PROCEDURE — 99204 OFFICE O/P NEW MOD 45 MIN: CPT | Performed by: INTERNAL MEDICINE

## 2020-01-15 PROCEDURE — 93000 ELECTROCARDIOGRAM COMPLETE: CPT | Performed by: INTERNAL MEDICINE

## 2020-01-15 NOTE — PATIENT INSTRUCTIONS
Plan:  1. EKG today   2. Repeat echocardiogram in one year   3. No medication changes   4.  Follow up with me in 1 year

## 2020-01-15 NOTE — LETTER
[address]   Cardiac Consultation    Referring Provider:  Herbert Francisco DO     Chief Complaint   Patient presents with    New Patient      Subjective: Gavino Tamayo is a new patient referred by Herbert Francisco DO for an abnormal EKG and AS; no complaints       History of Present Illness:    Gavino Tamayo is a 67 y.o. male who is here as new patient referred for evaluation of abnormal EKG felt to be afib. He has PMH of arthritis, gout, DM, HTN, HLD, mild AS, and prostate cancer s/p radiation seeds. He was thought to be in a fib during a [employer] screening (no copy to review). He then followed up with his PCP who repeated his EKG on 1/9/2020 which showed sinus stefano; low amplitude P wave; 1st degree A-V block, nonspecific ST changes, cannot r/o prior septal infarct. Most recent ECHO on 1/10/2020 showed EF is 55-60%, mild to mod AS (velocity=2.99m/s; mean pressure=19mmHg). Prior ECHO in 2015 showed an EF of 55% with mild AS, velocity=2.33m, gradient of 11 mmHg. Most recent lexiscan myoview in 8/2015 showed normal myocardial perfusion. Today he presents to his wife. He is originally from [place] and is a retired . He feels well overall and is only here due to being told he had afib suring his [employer] screening. Patient currently denies any weight gain, edema, palpitations, chest pain, shortness of breath, dizziness, and syncope. Past Medical History:   has a past medical history of Arthritis, Chronic gout without tophus, Diabetes mellitus ([address] ED (erectile dysfunction), Erectile dysfunction, Hyperlipidemia, Hypertension, Nocturia, Pre-diabetes, and Prostate CA ([address] Surgical History:   has a past surgical history that includes Prostate surgery; Tonsillectomy; Knee arthroscopy; knee surgery (Left, 1963); Colonoscopy (9/24/14); joint replacement (Left, 9/1/2015); and Cataract removal (Bilateral, 03/2017).      Social History: significantly. Will check another ECHO in 2-3 years unless symptoms develop. 3. Hypertension: Well controlled and will continue current medical regimen. 4. Hyperlipidemia: per PCP    5. Diabetes mellitus: per PCP       Plan:  1. EKG today shows NSR 66bpm; 1st degree AV block; IVCD; anteroseptal infarct pattern (no change from 1/10/20)  2. Repeat echocardiogram in 2-3 years unless symptoms develop,  3. No medication changes   4. Follow up with me in 1 year     Scribe's attestation: This note was scribed in the presence of Kb Duque M.D. By Uriel Rodriguez Dr. Roberto Vilchis, personally performed the services described in this documentation, as scribed by the above signed scribe in my presence. It is both accurate and complete to my knowledge. I agree with the details independently gathered by the clinical support staff, while the remaining scribed note accurately describes my personal service to the patient. Thank you for allowing me to participate in the care of this individual.    Amadou Solomon.  Demond Abraham M.D., [facility]

## 2020-01-15 NOTE — PROGRESS NOTES
[facility]   Cardiac Consultation    Referring Provider:  Debbie Go DO     Chief Complaint   Patient presents with    New Patient      Subjective: Angeline Viera is a new patient referred by Debbie Go DO for an abnormal EKG and AS; no complaints       History of Present Illness:    Angeline Viera is a 67 y.o. male who is here as new patient referred for evaluation of abnormal EKG felt to be afib. He has PMH of arthritis, gout, DM, HTN, HLD, mild AS, and prostate cancer s/p radiation seeds. He was thought to be in a fib during a [employer] screening (no copy to review). He then followed up with his PCP who repeated his EKG on 1/9/2020 which showed sinus stefano; low amplitude P wave; 1st degree A-V block, nonspecific ST changes, cannot r/o prior septal infarct. Most recent ECHO on 1/10/2020 showed EF is 55-60%, mild to mod AS (velocity=2.99m/s; mean pressure=19mmHg). Prior ECHO in 2015 showed an EF of 55% with mild AS, velocity=2.33m, gradient of 11 mmHg. Most recent lexiscan myoview in 8/2015 showed normal myocardial perfusion. Today he presents to his wife. He is originally from [facility] and is a retired . He feels well overall and is only here due to being told he had afib suring his [employer] screening. Patient currently denies any weight gain, edema, palpitations, chest pain, shortness of breath, dizziness, and syncope. Past Medical History:   has a past medical history of Arthritis, Chronic gout without tophus, Diabetes mellitus ([address] ED (erectile dysfunction), Erectile dysfunction, Hyperlipidemia, Hypertension, Nocturia, Pre-diabetes, and Prostate CA ([address] Surgical History:   has a past surgical history that includes Prostate surgery; Tonsillectomy; Knee arthroscopy; knee surgery (Left, 1963); Colonoscopy (9/24/14); joint replacement (Left, 9/1/2015); and Cataract removal (Bilateral, 03/2017). Social History:   he is  with one son.  He is a retired police officer. He drinks alcohol- 2-3 drinks of high balls a day. no drug use. Never smokes. reports that he has never smoked. He has never used smokeless tobacco. He reports current alcohol use. He reports that he does not use drugs. Family History:  family history includes Cancer in his father. Home Medications:  Prior to Admission medications    Medication Sig Start Date End Date Taking? Authorizing Provider   atenolol-chlorthalidone (TENORETIC) 100-25 MG per tablet Take 1 tablet by mouth daily 11/19/19  Yes Cheyanne Miguel, DO   metFORMIN (GLUCOPHAGE) 500 MG tablet Take 1 tablet by mouth 2 times daily (with meals) 11/19/19  Yes Camilla Rios, DO   lisinopril (PRINIVIL;ZESTRIL) 20 MG tablet Take 1 tablet by mouth daily 11/19/19  Yes Camilla Rios, DO   atorvastatin (LIPITOR) 40 MG tablet Take 1 tablet by mouth daily 11/19/19  Yes Camilla Rios, DO   Multiple Vitamins-Minerals (THERAPEUTIC MULTIVITAMIN-MINERALS) tablet Take 1 tablet by mouth daily   Yes Historical Provider, MD   aspirin 81 MG tablet Take 81 mg by mouth daily. Yes Historical Provider, MD        Allergies:  Pcn [penicillins]     Review of Systems:   · Constitutional: there has been no unanticipated weight loss. There's been no change in energy level, sleep pattern, or activity level. · Eyes: No visual changes or diplopia. No scleral icterus. · ENT: No Headaches, hearing loss or vertigo. No mouth sores or sore throat. · Cardiovascular: Reviewed in HPI  · Respiratory: No cough or wheezing, no sputum production. No hematemesis. · Gastrointestinal: No abdominal pain, appetite loss, blood in stools. No change in bowel or bladder habits. · Genitourinary: No dysuria, trouble voiding, or hematuria. · Musculoskeletal:  No gait disturbance, weakness or joint complaints. · Integumentary: No rash or pruritis. · Neurological: No headache, diplopia, change in muscle strength, numbness or tingling.  No change in gait, balance, coordination, mood, affect, memory, mentation, behavior. · Psychiatric: No anxiety, no depression. · Endocrine: No malaise, fatigue or temperature intolerance. No excessive thirst, fluid intake, or urination. No tremor. · Hematologic/Lymphatic: No abnormal bruising or bleeding, blood clots or swollen lymph nodes. · Allergic/Immunologic: No nasal congestion or hives. Physical Examination:    Vitals:    01/15/20 1511   BP: (!) 116/54   Pulse:    SpO2:         Constitutional and General Appearance: NAD   Respiratory:  · Normal excursion and expansion without use of accessory muscles  · Resp Auscultation: Normal breath sounds without dullness  Cardiovascular:  · The apical impulses not displaced  · Heart tones are crisp and normal  · Cervical veins are not engorged  · The carotid upstroke is normal in amplitude and contour without delay or bruit  · Normal S1S2, No S3, I-II/VI harsh MISTI  · Peripheral pulses are symmetrical and full  · There is no clubbing, cyanosis of the extremities. · No edema  · Femoral Arteries: 2+ and equal  · Pedal Pulses: 2+ and equal   Abdomen:  · No masses or tenderness  · Liver/Spleen: No Abnormalities Noted  Neurological/Psychiatric:  · Alert and oriented in all spheres  · Moves all extremities well  · Exhibits normal gait balance and coordination  · No abnormalities of mood, affect, memory, mentation, or behavior are noted  Skin:  · Skin: warm and dry. Assessment:     1. Abnormal EKG: NOT atrial fibrillation on EKG. The P wave amplitude is low and baseline artifact made interpretation more difficult but not afib. I do not have [employer] EKG but wife will try to get copy for me to review. 2. Aortic stenosis: Most recent ECHO on 1/10/2020 showed EF is 55-60%, mild to mod AS (velocity=2.99m/s; mean pressure=19mmHg). These values are c/w mild AS. He had mild AS on 2015 ECHO which has worsened but not significantly. Will check another ECHO in 2-3 years unless symptoms develop.      3. Hypertension: Well controlled and will continue current medical regimen. 4. Hyperlipidemia: per PCP    5. Diabetes mellitus: per PCP       Plan:  1. EKG today shows NSR 66bpm; 1st degree AV block; IVCD; anteroseptal infarct pattern (no change from 1/10/20)  2. Repeat echocardiogram in 2-3 years unless symptoms develop,  3. No medication changes   4. Follow up with me in 1 year     Scribe's attestation: This note was scribed in the presence of Malina Lee M.D. By Dr. Marco Bustos Records, personally performed the services described in this documentation, as scribed by the above signed scribe in my presence. It is both accurate and complete to my knowledge. I agree with the details independently gathered by the clinical support staff, while the remaining scribed note accurately describes my personal service to the patient. Thank you for allowing me to participate in the care of this individual.    Mable Villar M.D., [facility] [place]

## 2020-01-16 NOTE — TELEPHONE ENCOUNTER
Patient called requesting a refill on metformin 500mg.     [facility] that he uses

## 2020-02-07 ENCOUNTER — TELEPHONE (OUTPATIENT)
Dept: FAMILY MEDICINE CLINIC | Age: 80
End: 2020-02-07

## 2020-02-07 NOTE — TELEPHONE ENCOUNTER
Pt. States he is suppose to taking 1 metformin daily but pharmacy states RX is for two daily. Please clarify. If it is suppose to be once daily pharmacy will need a new RX.

## 2020-07-08 ENCOUNTER — TELEPHONE (OUTPATIENT)
Dept: PHARMACY | Facility: CLINIC | Age: 80
End: 2020-07-08

## 2020-07-08 NOTE — TELEPHONE ENCOUNTER
CLINICAL PHARMACY: ADHERENCE REVIEW  Identified care gap per [employer]; fills at [facility]: ACE/ARB and Diabetes adherence    Last Office Visit: 1/9/2020    Patient also appears to be taking: atorvastatin 40 mg daily. ASSESSMENT  ACE/ARB ADHERENCE  (2019 [place] = 99%; YTD PDC = 100%)    Per Insurance Records   Lisinopril last filled on 5/14/2020 for a 90 day supply. Due to refill on 8/14/2020. There is 1 refill on file. BP Readings from Last 3 Encounters:   01/15/20 (!) 116/54   01/09/20 (!) 144/68   11/19/19 134/62     CrCl cannot be calculated (Patient's most recent lab result is older than the maximum 120 days allowed. ). DIABETES ADHERENCE  (2019 [place] = 99%; YTD PDC = filled only once) Potential Fail Date: 7/12/2020    Per chart review: appears patient was prescribed BID, but only taking once daily. Prescription updated to once daily dosing on 2/7/20. Per [employer] Records   Metformin last filled on 2/8/2020 for #90. Per [address]   Metformin last picked up on 2/8/2020 for 90 day supply. It was filled and returned to stock in May. 3 refills remaining. Lab Results   Component Value Date    LABA1C 5.4 11/19/2019    LABA1C 5.4 05/24/2019    LABA1C 5.5 11/20/2018       STATIN ADHERENCE  PDC: no insurance claims to review    Per [address]   Atorvastatin last picked up in Nov 2019. It was filled and returned to stock in Feb 2020.  3 refills remaining.      Lab Results   Component Value Date    CHOL 136 11/19/2019    TRIG 148 11/19/2019    HDL 54 11/19/2019    LDLCALC 52 11/19/2019     ALT   Date Value Ref Range Status   01/09/2020 22 10 - 40 U/L Final     AST   Date Value Ref Range Status   01/09/2020 20 15 - 37 U/L Final     The 10-year ASCVD risk score (Renita Collins et al., 2013) is: 26.3%    Values used to calculate the score:      Age: 67 years      Sex: Male      Is Non- : No      Diabetic: No      Tobacco smoker: No      Systolic Blood Pressure: 512 mmHg      Is BP

## 2020-08-14 RX ORDER — LISINOPRIL 20 MG/1
TABLET ORAL
Qty: 90 TABLET | Refills: 0 | Status: SHIPPED | OUTPATIENT
Start: 2020-08-14 | End: 2020-08-17 | Stop reason: SDUPTHER

## 2020-08-14 NOTE — TELEPHONE ENCOUNTER
.  Last office visit 11-19-19    Last written 11-19-19 90 with 3      Next office visit scheduled 8-    Requested Prescriptions     Pending Prescriptions Disp Refills    lisinopril (PRINIVIL;ZESTRIL) 20 MG tablet [Pharmacy Med Name: Lisinopril Oral Tablet 20 MG] 90 tablet 0     Sig: TAKE 1 TABLET BY MOUTH ONE TIME A DAY

## 2020-08-17 ENCOUNTER — OFFICE VISIT (OUTPATIENT)
Dept: FAMILY MEDICINE CLINIC | Age: 80
End: 2020-08-17
Payer: MEDICARE

## 2020-08-17 VITALS
TEMPERATURE: 97.3 F | WEIGHT: 249.2 LBS | HEIGHT: 69 IN | HEART RATE: 71 BPM | DIASTOLIC BLOOD PRESSURE: 62 MMHG | OXYGEN SATURATION: 95 % | SYSTOLIC BLOOD PRESSURE: 134 MMHG | BODY MASS INDEX: 36.91 KG/M2

## 2020-08-17 PROCEDURE — G0439 PPPS, SUBSEQ VISIT: HCPCS | Performed by: FAMILY MEDICINE

## 2020-08-17 PROCEDURE — 99214 OFFICE O/P EST MOD 30 MIN: CPT | Performed by: FAMILY MEDICINE

## 2020-08-17 RX ORDER — ATORVASTATIN CALCIUM 40 MG/1
40 TABLET, FILM COATED ORAL DAILY
Qty: 90 TABLET | Refills: 3 | Status: SHIPPED | OUTPATIENT
Start: 2020-08-17 | End: 2021-08-23

## 2020-08-17 RX ORDER — LISINOPRIL 20 MG/1
TABLET ORAL
Qty: 90 TABLET | Refills: 1 | Status: SHIPPED | OUTPATIENT
Start: 2020-08-17 | End: 2021-02-23 | Stop reason: SDUPTHER

## 2020-08-17 RX ORDER — ATENOLOL AND CHLORTHALIDONE TABLET 100; 25 MG/1; MG/1
1 TABLET ORAL DAILY
Qty: 90 TABLET | Refills: 3 | Status: SHIPPED | OUTPATIENT
Start: 2020-08-17 | End: 2022-05-10

## 2020-08-17 ASSESSMENT — LIFESTYLE VARIABLES
HOW OFTEN DO YOU HAVE A DRINK CONTAINING ALCOHOL: 4
HOW MANY STANDARD DRINKS CONTAINING ALCOHOL DO YOU HAVE ON A TYPICAL DAY: 1
HAVE YOU OR SOMEONE ELSE BEEN INJURED AS A RESULT OF YOUR DRINKING: 0
HOW OFTEN DURING THE LAST YEAR HAVE YOU NEEDED AN ALCOHOLIC DRINK FIRST THING IN THE MORNING TO GET YOURSELF GOING AFTER A NIGHT OF HEAVY DRINKING: 0
HOW OFTEN DURING THE LAST YEAR HAVE YOU BEEN UNABLE TO REMEMBER WHAT HAPPENED THE NIGHT BEFORE BECAUSE YOU HAD BEEN DRINKING: 0
HOW OFTEN DURING THE LAST YEAR HAVE YOU FOUND THAT YOU WERE NOT ABLE TO STOP DRINKING ONCE YOU HAD STARTED: 0
HAS A RELATIVE, FRIEND, DOCTOR, OR ANOTHER HEALTH PROFESSIONAL EXPRESSED CONCERN ABOUT YOUR DRINKING OR SUGGESTED YOU CUT DOWN: 0
HOW OFTEN DO YOU HAVE SIX OR MORE DRINKS ON ONE OCCASION: 0
AUDIT-C TOTAL SCORE: 5
AUDIT TOTAL SCORE: 5
HOW OFTEN DURING THE LAST YEAR HAVE YOU HAD A FEELING OF GUILT OR REMORSE AFTER DRINKING: 0
HOW OFTEN DURING THE LAST YEAR HAVE YOU FAILED TO DO WHAT WAS NORMALLY EXPECTED FROM YOU BECAUSE OF DRINKING: 0

## 2020-08-17 ASSESSMENT — PATIENT HEALTH QUESTIONNAIRE - PHQ9
SUM OF ALL RESPONSES TO PHQ QUESTIONS 1-9: 0
SUM OF ALL RESPONSES TO PHQ QUESTIONS 1-9: 0

## 2020-08-17 NOTE — PROGRESS NOTES
Mahesh Aviles is a 67 y.o. male    Chief Complaint   Patient presents with    Medicare AWV    Diabetes    Hypertension    Hyperlipidemia       HPI:    HPI    Diabetes   He presents for his follow-up diabetic visit. He has type 2 diabetes mellitus. His disease course has been stable. Pertinent negatives for diabetes include no polydipsia. Risk factors for coronary artery disease include male sex, diabetes mellitus and hypertension. Current diabetic treatment includes oral agent (monotherapy). An ACE inhibitor/angiotensin II receptor blocker is being taken. Hypertension   This is a chronic problem. The current episode started more than 1 year ago. The problem is unchanged. The problem is controlled. Risk factors for coronary artery disease include diabetes mellitus. Past treatments include ACE inhibitors, beta blockers and diuretics. The current treatment provides significant improvement. There are no compliance problems. Hyperlipidemia   This is a chronic problem. The current episode started more than 1 year ago. The problem is controlled. Recent lipid tests were reviewed and are normal. Exacerbating diseases include diabetes. Pertinent negatives include no myalgias. Current antihyperlipidemic treatment includes statins. The current treatment provides significant improvement of lipids. There are no compliance problems. Risk factors for coronary artery disease include diabetes mellitus, hypertension, male sex and obesity. ROS:    Review of Systems   Cardiovascular: Negative for palpitations. Endocrine: Negative for polydipsia. /62   Pulse 71   Temp 97.3 °F (36.3 °C)   Ht 5' 9\" (1.753 m)   Wt 249 lb 3.2 oz (113 kg)   SpO2 95%   BMI 36.80 kg/m²     Physical Exam:    Physical Exam  Constitutional:       General: He is not in acute distress. Appearance: Normal appearance. He is obese. He is not ill-appearing or toxic-appearing. HENT:      Head: Normocephalic.    Neurological: Mental Status: He is alert. Psychiatric:         Mood and Affect: Mood normal.         Behavior: Behavior normal.         Thought Content: Thought content normal.         Current Outpatient Medications   Medication Sig Dispense Refill    lisinopril (PRINIVIL;ZESTRIL) 20 MG tablet TAKE 1 TABLET BY MOUTH ONE TIME A DAY 90 tablet 1    metFORMIN (GLUCOPHAGE) 500 MG tablet Take 1 tablet by mouth daily (with breakfast) 90 tablet 3    atenolol-chlorthalidone (TENORETIC) 100-25 MG per tablet Take 1 tablet by mouth daily 90 tablet 3    atorvastatin (LIPITOR) 40 MG tablet Take 1 tablet by mouth daily 90 tablet 3    Multiple Vitamins-Minerals (THERAPEUTIC MULTIVITAMIN-MINERALS) tablet Take 1 tablet by mouth daily      aspirin 81 MG tablet Take 81 mg by mouth daily. No current facility-administered medications for this visit. Assessment:    1. Routine general medical examination at a health care facility    2. Pre-diabetes    3. Essential hypertension    4. Mixed hyperlipidemia    5. Morbidly obese ([address]        Plan:    1. Routine general medical examination at a health care facility    2. Pre-diabetes  Stable. Continue current medications. - metFORMIN (GLUCOPHAGE) 500 MG tablet; Take 1 tablet by mouth daily (with breakfast)  Dispense: 90 tablet; Refill: 3    3. Essential hypertension  Stable. Continue current medications. - lisinopril (PRINIVIL;ZESTRIL) 20 MG tablet; TAKE 1 TABLET BY MOUTH ONE TIME A DAY  Dispense: 90 tablet; Refill: 1  - atenolol-chlorthalidone (TENORETIC) 100-25 MG per tablet; Take 1 tablet by mouth daily  Dispense: 90 tablet; Refill: 3    4. Mixed hyperlipidemia  Stable. Continue current medications. - atorvastatin (LIPITOR) 40 MG tablet; Take 1 tablet by mouth daily  Dispense: 90 tablet; Refill: 3    5. Morbidly obese ([address]  Encouraged a diet low in saturated fat/cholesterol, small portion sizes and regular exercises.        Return in 6 months (on 2/17/2021) for Diabetes, PROSTATE SURGERY      TONSILLECTOMY           Family History   Problem Relation Age of Onset    Cancer Father         leukemia       CareTeam (Including outside providers/suppliers regularly involved in providing care):   Patient Care Team:  Chris Pirtchard, DO as PCP - General (Family Medicine)  Chris Pritchard, DO as PCP - [facility] Empaneled Provider    Wt Readings from Last 3 Encounters:   08/17/20 249 lb 3.2 oz (113 kg)   01/15/20 252 lb 6.4 oz (114.5 kg)   11/19/19 248 lb (112.5 kg)     Vitals:    08/17/20 1311   BP: 134/62   Pulse: 71   Temp: 97.3 °F (36.3 °C)   SpO2: 95%   Weight: 249 lb 3.2 oz (113 kg)   Height: 5' 9\" (1.753 m)     Body mass index is 36.8 kg/m². Based upon direct observation of the patient, evaluation of cognition reveals recent and remote memory intact. Patient's complete Health Risk Assessment and screening values have been reviewed and are found in Flowsheets. The following problems were reviewed today and where indicated follow up appointments were made and/or referrals ordered. Positive Risk Factor Screenings with Interventions:     Health Habits/Nutrition:  Health Habits/Nutrition  Do you exercise for at least 20 minutes 2-3 times per week?: Yes  Have you lost any weight without trying in the past 3 months?: No  Do you eat fewer than 2 meals per day?: No  Have you seen a dentist within the past year?: Yes  Body mass index is 36.8 kg/m².   Health Habits/Nutrition Interventions:  · Inadequate physical activity:  educational materials provided to promote increased physical activity    Personalized Preventive Plan   Current Health Maintenance Status  Immunization History   Administered Date(s) Administered    Influenza Vaccine, unspecified formulation 10/29/2015    Influenza, High Dose (Fluzone 65 yrs and older) 10/11/2016, 09/06/2017, 09/26/2019    Pneumococcal Conjugate 13-valent ([id number]) 02/26/2015, 02/02/2016    Pneumococcal Polysaccharide ([id number]) 12/06/2017    Tdap (Boostrix, Adacel) 02/02/2016    Zoster Recombinant (Shingrix) 09/26/2019        Health Maintenance   Topic Date Due    PSA counseling  10/07/1991    Annual Wellness Visit (AWV)  05/29/2019    Shingles Vaccine (2 of 2) 11/21/2019    Flu vaccine (1) 09/01/2020    Lipid screen  11/19/2020    Potassium monitoring  01/09/2021    Creatinine monitoring  01/09/2021    DTaP/Tdap/Td vaccine (2 - Td) 02/02/2026    Pneumococcal 65+ years Vaccine  Completed    Hepatitis A vaccine  Aged Out    Hepatitis B vaccine  Aged Out    Hib vaccine  Aged Out    Meningococcal (ACWY) vaccine  Aged Out     Recommendations for [employer] Due: see orders and patient instructions/AVS.  . Recommended screening schedule for the next 5-10 years is provided to the patient in written form: see Patient Instructions/AVS.    Franc ODELL LPN, 5/82/5255, performed the documented evaluation under the direct supervision of the attending physician. This encounter was performed under Liza english DOs, direct supervision, 8/17/2020.

## 2020-08-17 NOTE — PATIENT INSTRUCTIONS
Personalized Preventive Plan for Bolivar Hyatt - 8/17/2020  Medicare offers a range of preventive health benefits. Some of the tests and screenings are paid in full while other may be subject to a deductible, co-insurance, and/or copay. Some of these benefits include a comprehensive review of your medical history including lifestyle, illnesses that may run in your family, and various assessments and screenings as appropriate. After reviewing your medical record and screening and assessments performed today your provider may have ordered immunizations, labs, imaging, and/or referrals for you. A list of these orders (if applicable) as well as your Preventive Care list are included within your After Visit Summary for your review. Other Preventive Recommendations:    · A preventive eye exam performed by an eye specialist is recommended every 1-2 years to screen for glaucoma; cataracts, macular degeneration, and other eye disorders. · A preventive dental visit is recommended every 6 months. · Try to get at least 150 minutes of exercise per week or 10,000 steps per day on a pedometer . · Order or download the FREE \"Exercise & Physical Activity: Your Everyday Guide\" from [employer]. Call 6-133.636.6002 or search [employer] online. · You need 4144-8513 mg of calcium and 5649-0685 IU of vitamin D per day. It is possible to meet your calcium requirement with diet alone, but a vitamin D supplement is usually necessary to meet this goal.  · When exposed to the sun, use a sunscreen that protects against both UVA and UVB radiation with an SPF of 30 or greater. Reapply every 2 to 3 hours or after sweating, drying off with a towel, or swimming. · Always wear a seat belt when traveling in a car. Always wear a helmet when riding a bicycle or motorcycle. Heart-Healthy Diet   Sodium, Fat, and Cholesterol Controlled Diet       What Is a Heart Healthy Diet?    A heart-healthy diet is one that limits sodium , certain types of fat , and cholesterol . This type of diet is recommended for:   People with any form of cardiovascular disease (eg, coronary heart disease , peripheral vascular disease , previous heart attack , previous stroke )   People with risk factors for cardiovascular disease, such as high blood pressure , high cholesterol , or diabetes   Anyone who wants to lower their risk of developing cardiovascular disease   Sodium    Sodium is a mineral found in many foods. In general, most people consume much more sodium than they need. Diets high in sodium can increase blood pressure and lead to edema (water retention). On a heart-healthy diet, you should consume no more than 2,300 mg (milligrams) of sodium per dayabout the amount in one teaspoon of table salt. The foods highest in sodium include table salt (about 50% sodium), processed foods, convenience foods, and preserved foods. Cholesterol    Cholesterol is a fat-like, waxy substance in your blood. Our bodies make some cholesterol. It is also found in animal products, with the highest amounts in fatty meat, egg yolks, whole milk, cheese, shellfish, and organ meats. On a heart-healthy diet, you should limit your cholesterol intake to less than 200 mg per day. It is normal and important to have some cholesterol in your bloodstream. But too much cholesterol can cause plaque to build up within your arteries, which can eventually lead to a heart attack or stroke. The two types of cholesterol that are most commonly referred to are:   Low-density lipoprotein (LDL) cholesterol  Also known as bad cholesterol, this is the cholesterol that tends to build up along your arteries. Bad cholesterol levels are increased by eating fats that are saturated or hydrogenated. Optimal level of this cholesterol is less than 100. Over 130 starts to get risky for heart disease.    High-density lipoprotein (HDL) cholesterol  Also known as good cholesterol, this type of cholesterol actually carries cholesterol away from your arteries and may, therefore, help lower your risk of having a heart attack. You want this level to be high (ideally greater than 60). It is a risk to have a level less than 40. You can raise this good cholesterol by eating olive oil, canola oil, avocados, or nuts. Exercise raises this level, too. Fat    Fat is calorie dense and packs a lot of calories into a small amount of food. Even though fats should be limited due to their high calorie content, not all fats are bad. In fact, some fats are quite healthful. Fat can be broken down into four main types. The good-for-you fats are:   Monounsaturated fat  found in oils such as olive and canola, avocados, and nuts and natural nut butters; can decrease cholesterol levels, while keeping levels of HDL cholesterol high   Polyunsaturated fat  found in oils such as safflower, sunflower, soybean, corn, and sesame; can decrease total cholesterol and LDL cholesterol   Omega-3 fatty acids  particularly those found in fatty fish (such as salmon, trout, tuna, mackerel, herring, and sardines); can decrease risk of arrhythmias, decrease triglyceride levels, and slightly lower blood pressure   The fats that you want to limit are:   Saturated fat  found in animal products, many fast foods, and a few vegetables; increases total blood cholesterol, including LDL levels   Animal fats that are saturated include: butter, lard, whole-milk dairy products, meat fat, and poultry skin   Vegetable fats that are saturated include: hydrogenated shortening, palm oil, coconut oil, cocoa butter   Hydrogenated or trans fat  found in margarine and vegetable shortening, most shelf stable snack foods, and fried foods; increases LDL and decreases HDL     It is generally recommended that you limit your total fat for the day to less than 30% of your total calories.  If you follow an 1800-calorie heart healthy diet, for example, this would mean 60 grams of fat or less per day. Saturated fat and trans fat in your diet raises your blood cholesterol the most, much more than dietary cholesterol does. For this reason, on a heart-healthy diet, less than 7% of your calories should come from saturated fat and ideally 0% from trans fat. On an 1800-calorie diet, this translates into less than 14 grams of saturated fat per day, leaving 46 grams of fat to come from mono- and polyunsaturated fats.    Food Choices on a Heart Healthy Diet   Food Category   Foods Recommended   Foods to Avoid   Grains   Breads and rolls without salted tops Most dry and cooked cereals Unsalted crackers and breadsticks Low-sodium or homemade breadcrumbs or stuffing All rice and pastas   Breads, rolls, and crackers with salted tops High-fat baked goods (eg, muffins, donuts, pastries) Quick breads, self-rising flour, and biscuit mixes Regular bread crumbs Instant hot cereals Commercially prepared rice, pasta, or stuffing mixes   Vegetables   Most fresh, frozen, and low-sodium canned vegetables Low-sodium and salt-free vegetable juices Canned vegetables if unsalted or rinsed   Regular canned vegetables and juices, including sauerkraut and pickled vegetables Frozen vegetables with sauces Commercially prepared potato and vegetable mixes   Fruits   Most fresh, frozen, and canned fruits All fruit juices   Fruits processed with salt or sodium   Milk   Nonfat or low-fat (1%) milk Nonfat or low-fat yogurt Cottage cheese, low-fat ricotta, cheeses labeled as low-fat and low-sodium   Whole milk Reduced-fat (2%) milk Malted and chocolate milk Full fat yogurt Most cheeses (unless low-fat and low salt) Buttermilk (no more than 1 cup per week)   Meats and Beans   Lean cuts of fresh or frozen beef, veal, lamb, or pork (look for the word loin) Fresh or frozen poultry without the skin Fresh or frozen fish and some shellfish Egg whites and egg substitutes (Limit whole eggs to three per week) Tofu Nuts or seeds (unsalted, dry-roasted), low-sodium peanut butter Dried peas, beans, and lentils   Any smoked, cured, salted, or canned meat, fish, or poultry (including elizondo, chipped beef, cold cuts, hot dogs, sausages, sardines, and anchovies) Poultry skins Breaded and/or fried fish or meats Canned peas, beans, and lentils Salted nuts   Fats and Oils   Olive oil and canola oil Low-sodium, low-fat salad dressings and mayonnaise   Butter, margarine, coconut and palm oils, elizondo fat   Snacks, Sweets, and Condiments   Low-sodium or unsalted versions of broths, soups, soy sauce, and condiments Pepper, herbs, and spices; vinegar, lemon, or lime juice Low-fat frozen desserts (yogurt, sherbet, fruit bars) Sugar, cocoa powder, honey, syrup, jam, and preserves Low-fat, trans-fat free cookies, cakes, and pies Frandy and animal crackers, fig bars, aziza snaps   High-fat desserts Broth, soups, gravies, and sauces, made from instant mixes or other high-sodium ingredients Salted snack foods Canned olives Meat tenderizers, seasoning salt, and most flavored vinegars   Beverages   Low-sodium carbonated beverages Tea and coffee in moderation Soy milk   Commercially softened water   Suggestions   Make whole grains, fruits, and vegetables the base of your diet. Choose heart-healthy fats such as canola, olive, and flaxseed oil, and foods high in heart-healthy fats, such as nuts, seeds, soybeans, tofu, and fish. Eat fish at least twice per week; the fish highest in omega-3 fatty acids and lowest in mercury include salmon, herring, mackerel, sardines, and canned chunk light tuna. If you eat fish less than twice per week or have high triglycerides, talk to your doctor about taking fish oil supplements. Read food labels.    For products low in fat and cholesterol, look for fat free, low-fat, cholesterol free, saturated fat free, and trans fat freeAlso scan the Nutrition Facts Label, which lists saturated fat, trans fat, and cholesterol amounts. For products low in sodium, look for sodium free, very low sodium, low sodium, no added salt, and unsalted   Skip the salt when cooking or at the table; if food needs more flavor, get creative and try out different herbs and spices. Garlic and onion also add substantial flavor to foods. Trim any visible fat off meat and poultry before cooking, and drain the fat off after matson. Use cooking methods that require little or no added fat, such as grilling, boiling, baking, poaching, broiling, roasting, steaming, stir-frying, and sauting. Avoid fast food and convenience food. They tend to be high in saturated and trans fat and have a lot of added salt. Talk to a registered dietitian for individualized diet advice. Last Reviewed: March 2011 Kimberlyn Aviles MS, MPH, RD   Updated: 3/29/2011   ·   Personalized Preventive Plan for Tanner Peck - 8/17/2020  Medicare offers a range of preventive health benefits. Some of the tests and screenings are paid in full while other may be subject to a deductible, co-insurance, and/or copay. Some of these benefits include a comprehensive review of your medical history including lifestyle, illnesses that may run in your family, and various assessments and screenings as appropriate. After reviewing your medical record and screening and assessments performed today your provider may have ordered immunizations, labs, imaging, and/or referrals for you. A list of these orders (if applicable) as well as your Preventive Care list are included within your After Visit Summary for your review. Other Preventive Recommendations:    A preventive eye exam performed by an eye specialist is recommended every 1-2 years to screen for glaucoma; cataracts, macular degeneration, and other eye disorders. A preventive dental visit is recommended every 6 months.   Try to get at least 150 minutes of exercise per week or 10,000 steps per day on a pedometer

## 2020-11-17 NOTE — PROGRESS NOTES
[facility]) Dermatology  Sharan Clayton MD  [phone]      Ana Skelton  1940    79 y.o. male     Date of Visit: 11/19/2020    Last Visit: 2yr    Chief Complaint: Skin check    History of Present Illness:  1. Here for skin check. History of actinic keratoses s/p cryotherapy. Unsure of new lesions.   -Spends time in sun cutting grass. Always wears T shirt. Does not use hat or sunscreen regularly. 2. Complains of a pruritic lesion on R cheek     Review of Systems:  Constitutional: Reports general sense of well-being. Skin: No interval severe sunburns. Allergies: Reviewed and updated. Past Medical History, Surgical History, Medications and Allergies reviewed.      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    Diabetes mellitus ([address]     ED (erectile dysfunction)     Erectile dysfunction 2/2/2016    Hyperlipidemia     Hypertension     Nocturia     Pre-diabetes     Prostate CA ([address]     seeds implanted approx 2004     Past Surgical History:   Procedure Laterality Date    CATARACT REMOVAL Bilateral 03/2017    COLONOSCOPY  9/24/14    multiple polyp    JOINT REPLACEMENT Left 9/1/2015    left total knee replacement    KNEE ARTHROSCOPY      left knee    KNEE SURGERY Left 1963    PROSTATE SURGERY      TONSILLECTOMY         Allergies   Allergen Reactions    Pcn [Penicillins] Hives     Outpatient Medications Marked as Taking for the 11/19/20 encounter (Office Visit) with Sharan Clayton MD   Medication Sig Dispense Refill    lisinopril (PRINIVIL;ZESTRIL) 20 MG tablet TAKE 1 TABLET BY MOUTH ONE TIME A DAY 90 tablet 1    metFORMIN (GLUCOPHAGE) 500 MG tablet Take 1 tablet by mouth daily (with breakfast) 90 tablet 3    atenolol-chlorthalidone (TENORETIC) 100-25 MG per tablet Take 1 tablet by mouth daily 90 tablet 3    atorvastatin (LIPITOR) 40 MG tablet Take 1 tablet by mouth daily 90 tablet 3    Multiple Vitamins-Minerals (THERAPEUTIC MULTIVITAMIN-MINERALS) tablet Take 1 tablet by mouth daily      aspirin 81 MG tablet Take 81 mg by mouth daily. Social History: Retired from "[employer]. [employer]. Physical Examination     The following were examined and determined to be normal: Psych/Neuro, Scalp/hair, Conjunctivae/eyelids, Gums/teeth/lips, Breast/axilla/chest, Abdomen, Back, RUE, LUE, RLE, LLE, Nails/digits andNeck Genitalia/groin/buttocks. The following were examined and determined to be abnormal: Head/face and .     -General: Well-appearing, NAD  1. R 2 and L 1 temples, L helix 1 - ill-defined irregularly-shaped roughly-scaling thin pink macule(s)/papule(s)   2. R medial cheek - 7mm round dark brown papule       Assessment and Plan     1. Actinic keratosis(es)  -Edu re: relationship with chronic cumulative sun exposure, low premalignant potential.   -4 lesion(s) treated w/ liquid nitrogen x 2 cycles - R 2 and L 1 temples, L helix 1. Edu re: risk of blister formation, discomfort, scar, hypopigmentation. Discussed wound care. -Reviewed sun protective behavior -- sun avoidance during the peak hours of the day, sun-protective clothing (including hat and sunglasses), sunscreen use (water resistant, broad spectrum, SPF at least 30, need for reapplication every 2 to 3 hours). -Return for full skin exam in 1 year (sooner if indicated)      2. Neoplasm of uncertain behavior of skin - R/o inflamed seborrheic keratosis, R medial cheek  -Discussed possible diagnosis. Patient agreeable to biopsy. Verbal consent obtained after risks (infection, bleeding, scar), benefits and alternatives explained. -Area(s) to be biopsied were marked with a surgical pen. Site(s) were cleansed with alcohol. Local anesthesia achieved with 1% lidocaine with epinephrine/sodium bicarbonate. Shave biopsy performed with a razor blade. Hemostasis was achieved with aluminum chloride. The wound(s) were dressed with petrolatum and covered with a bandage.  Specimen(s) sent to pathology. Pt educated re: risk of bleeding, infection, scar and wound care instructions.

## 2020-11-17 NOTE — PATIENT INSTRUCTIONS
Protecting Yourself From the Sun    · Apply broad spectrum water resistant sunscreen with an SPF of at least 30 to exposed areas of the skin. Dont forget the ears and lips! Remember to reapply sunscreen about every 2 hours and after swimming or sweating. · Wear sun protective clothing. Swim shirts (aka. rash guards) are a great idea and negates the need to reapply sunscreen in those areas. · Seek the shade whenever possible especially between the hours of 10 am and 4 pm when the suns rays are the strongest.     · Avoid tanning beds       Cryosurgery (Freezing) Wound Care Instructions    AFTER THE PROCEDURE:    You will notice swelling and redness around the site. This is normal.    You may experience a sharp or sore feeling for the next several days. For this discomfort, you may take acetaminophen (Tylenol©).  A blister may develop at the treated area, sometimes as soon as by the end of the day. After several days, the blister will subside and a scab will form.  If the area is bumped or traumatized during the first few days following freezing, you may develop bleeding into the blister, forming a blood blister. This is nothing to be alarmed about.  If the blister is tense, uncomfortable, or much larger than the site that was frozen, you may pop the blister along its edge with a sterile needle (boiled, heated under a flame, or cleaned with alcohol) to allow the fluid to drain out. If the blister does not bother you, no treatment is needed.  Do NOT peel off the top of the blister roof. It will act as a dressing on top of your wound. WOUND CARE:    You may shower or bathe as usual, but avoid scrubbing the areas that have been frozen.  Cleanse the site twice a day with mild soapy water, and then apply a thin film of white petrolatum (Vaseline©).  You do not need to cover the area, but can if you prefer.     Do NOT allow the site to become dry or crusted, or attempt to dry it out with rubbing alcohol or hydrogen peroxide.  Continue this regimen until the area is pink and healed. Depending on the size and location of your cryosurgery site, healing may take 2 to 4 weeks.  The area may continue to be pink for several weeks, and over the next few months may become darker or lighter than the surrounding skin. This may be a permanent change. Biopsy Wound Care Instructions    · Keep the bandage in place for 24 hours. · Cleanse the wound with mild soapy water daily   Gently dry the area.  Apply Vaseline or petroleum jelly to the wound using a cotton tipped applicator.  Cover with a clean bandage.  Repeat this process until the biopsy site is healed.  If you had stitches placed, continue treating the site until the stitches are removed. Remember to make an appointment to return to have your stitches removed by our staff.  You may shower and bathe as usual.       ** Biopsy results generally take around 7 business days to come back. If you have not heard from us by then, please call the office at [phone] between 8AM and 4PM Monday through Friday.

## 2020-11-19 ENCOUNTER — OFFICE VISIT (OUTPATIENT)
Dept: DERMATOLOGY | Age: 80
End: 2020-11-19
Payer: MEDICARE

## 2020-11-19 VITALS — TEMPERATURE: 97 F

## 2020-11-19 PROCEDURE — 99213 OFFICE O/P EST LOW 20 MIN: CPT | Performed by: DERMATOLOGY

## 2020-11-19 PROCEDURE — 11102 TANGNTL BX SKIN SINGLE LES: CPT | Performed by: DERMATOLOGY

## 2020-11-19 PROCEDURE — 17000 DESTRUCT PREMALG LESION: CPT | Performed by: DERMATOLOGY

## 2020-11-19 PROCEDURE — 17003 DESTRUCT PREMALG LES 2-14: CPT | Performed by: DERMATOLOGY

## 2020-11-23 LAB — DERMATOLOGY PATHOLOGY REPORT: NORMAL

## 2021-02-02 NOTE — PROGRESS NOTES
[facility]   Cardiac Consultation    Referring Provider:  [address] [place] [address] Drive,      Chief Complaint   Patient presents with    1 Year Follow Up    Hypertension      Subjective: Clarisa Bolaños presents for 1 year follow up. He c/o TELLEZ with hills today    History of Present Illness:    Clarisa Bolaños is a 79 y.o. male who here for routine cardiac f/u. I originally saw him 1/15/20 to establish care and concern for abnormal EKG felt to be afib. He has PMH of arthritis, gout, DM, HTN, HLD, mild AS, and prostate cancer s/p radiation seeds. He was thought to be in a fib during a [employer] screening (no copy to review). He saw PCP who repeated his EKG on 1/9/2020 which showed sinus stefano; low amplitude P wave; 1st degree A-V block, nonspecific ST changes, cannot r/o prior septal infarct. Most recent ECHO on 1/10/2020 showed EF is 55-60%, mild to mod AS (velocity=2.99m/s; mean pressure=19mmHg). Prior ECHO in 2015 showed an EF of 55% with mild AS, velocity=2.33m, gradient of 11 mmHg. Most recent lexiscan myoview in 8/2015 showed normal myocardial perfusion. Most recent EKG 1/15/20 NSR 66bpm; prolonged first degree AV block; anterior infarct pattern; nonspecific IVCD. Originally from [facility] and is a retired . Today he reports feeling OK overall but c/o SOB with exertion. He states he walks his dog for exercise and notices going up hills he has more SOB. He states this is new since last year. He denies chest pain, palpitations, dizziness or syncope. He is taking his medications as prescribed. Patient with no complaints of chest pain, palpitations, dizziness, edema, or orthopnea/PND. Past Medical History:   has a past medical history of Arthritis, Chronic gout without tophus, Diabetes mellitus ([address] ED (erectile dysfunction), Erectile dysfunction, Hyperlipidemia, Hypertension, Nocturia, Pre-diabetes, and Prostate CA ([address]     Surgical History:   has a past surgical history that includes Prostate surgery; Tonsillectomy; Knee arthroscopy; knee surgery (Left, 1963); Colonoscopy (9/24/14); joint replacement (Left, 9/1/2015); and Cataract removal (Bilateral, 03/2017). Social History:   he is  with one son. He is a retired . He drinks alcohol- 2-3 drinks of high balls a day. no drug use. Never smokes. reports that he has never smoked. He has never used smokeless tobacco. He reports current alcohol use. He reports that he does not use drugs. Family History:  family history includes Cancer in his father. No significant heart disease history in parents    Home Medications:  Prior to Admission medications    Medication Sig Start Date End Date Taking? Authorizing Provider   lisinopril (PRINIVIL;ZESTRIL) 20 MG tablet TAKE 1 TABLET BY MOUTH ONE TIME A DAY 8/17/20  Yes Mira Ventura, DO   metFORMIN (GLUCOPHAGE) 500 MG tablet Take 1 tablet by mouth daily (with breakfast) 8/17/20  Yes Camilla Rios, DO   atenolol-chlorthalidone (TENORETIC) 100-25 MG per tablet Take 1 tablet by mouth daily 8/17/20  Yes Camilla Rios, DO   atorvastatin (LIPITOR) 40 MG tablet Take 1 tablet by mouth daily 8/17/20  Yes Camilla Rios,    Multiple Vitamins-Minerals (THERAPEUTIC MULTIVITAMIN-MINERALS) tablet Take 1 tablet by mouth daily   Yes Historical Provider, MD   aspirin 81 MG tablet Take 81 mg by mouth daily. Yes Historical Provider, MD        Allergies:  Pcn [penicillins]     Review of Systems:   · Constitutional: there has been no unanticipated weight loss. There's been no change in energy level, sleep pattern, or activity level. · Eyes: No visual changes or diplopia. No scleral icterus. · ENT: No Headaches, hearing loss or vertigo. No mouth sores or sore throat. · Cardiovascular: Reviewed in HPI  · Respiratory: No cough or wheezing, no sputum production. No hematemesis. · Gastrointestinal: No abdominal pain, appetite loss, blood in stools. No change in bowel or bladder habits.   · Genitourinary: No dysuria, trouble voiding, or hematuria. · Musculoskeletal:  No gait disturbance, weakness or joint complaints. · Integumentary: No rash or pruritis. · Neurological: No headache, diplopia, change in muscle strength, numbness or tingling. No change in gait, balance, coordination, mood, affect, memory, mentation, behavior. · Psychiatric: No anxiety, no depression. · Endocrine: No malaise, fatigue or temperature intolerance. No excessive thirst, fluid intake, or urination. No tremor. · Hematologic/Lymphatic: No abnormal bruising or bleeding, blood clots or swollen lymph nodes. · Allergic/Immunologic: No nasal congestion or hives. Physical Examination:    Vitals:    02/03/21 1053   BP: (!) 118/44   Pulse:    SpO2:         Constitutional and General Appearance: NAD   Respiratory:  · Normal excursion and expansion without use of accessory muscles  · Resp Auscultation: Normal breath sounds without dullness  Cardiovascular:  · The apical impulses not displaced  · Heart tones are crisp and normal  · Cervical veins are not engorged  · The carotid upstroke is normal in amplitude and contour without delay or bruit  · Normal S1S2, No S3, II/VI harsh MISTI  · Peripheral pulses are symmetrical and full  · There is no clubbing, cyanosis of the extremities. · 1 + BLE  edema  · Femoral Arteries: 2+ and equal  · Pedal Pulses: 2+ and equal   Abdomen:  · No masses or tenderness  · Liver/Spleen: No Abnormalities Noted  Neurological/Psychiatric:  · Alert and oriented in all spheres  · Moves all extremities well  · Exhibits normal gait balance and coordination  · No abnormalities of mood, affect, memory, mentation, or behavior are noted  Skin:  · Skin: warm and dry. Assessment:     1. Abnormal EKG: Today's EKG shows possible atrial fibrillation 47bpm; anterior infarct; nonspecific ST change. I want to perform 48 hour holter monitor to evaluate heart rhythm and rate more closely.  If definite afib he will need NOAC and we discussed at length risks/benefits of AC. He agrees to proceed if needed. 2. Aortic stenosis: Most recent ECHO on 1/10/2020 showed EF is 55-60%, mild to mod AS (velocity=2.99m/s; mean pressure=19mmHg). These values are c/w mild AS. He had mild AS on 2015 ECHO which has worsened but not significantly. 3. Hypertension: Well controlled and will continue current medical regimen. 4. Hyperlipidemia: per PCP    5. Diabetes mellitus: per PCP     6. SOB: Given new symptom of SOB with walking hills I need to be concerned for worsening valve disease, arrhythmia (possible afib), and/or ischemia with SOB being anginal equivalent. He has multiple CAD risk factors. He merits non-invasive cardiac evaluation with repeat ECHO to reassess AS and nuclear stress study to assess for ischemia. Plan:    1. Limited Echocardiogram to view size and strength of the heart and aortic stenosis value  2. Stress test to risk stratify for new onset of SOB. 2 days prior do not take atenolol/chlor. 3. Today's EKG shows possible atrial fibrillation 47bpm; anterior infarct; nonspecific ST change. 4.  Labs -  Lipids, CMP, CBC   5. We will call you with the results of the testing   6. Recommend cutting the atenolol/chlorthalidone in 1/2.   7. 48 hour heart monitor for possible afib. If monitor confirms will need to start NOAC. See #1 above. This note was scribed in the presence of Dr.Meyers HAWKINS by Cindy Jauregui RN.     I, Dr. Azeb Dorman, personally performed the services described in this documentation, as scribed by the above signed scribe in my presence. It is both accurate and complete to my knowledge. I agree with the details independently gathered by the clinical support staff, while the remaining scribed note accurately describes my personal service to the patient. Cost of prescription medications and patient compliance have been reviewed with patient. All questions answered.      Thank you for allowing me to participate in the care of this individual.    Karen Mendiola.  Oz Bailey M.D., [facility]

## 2021-02-03 ENCOUNTER — OFFICE VISIT (OUTPATIENT)
Dept: CARDIOLOGY CLINIC | Age: 81
End: 2021-02-03
Payer: MEDICARE

## 2021-02-03 ENCOUNTER — TELEPHONE (OUTPATIENT)
Dept: CARDIOLOGY CLINIC | Age: 81
End: 2021-02-03

## 2021-02-03 VITALS
HEIGHT: 69 IN | DIASTOLIC BLOOD PRESSURE: 44 MMHG | SYSTOLIC BLOOD PRESSURE: 118 MMHG | WEIGHT: 256 LBS | OXYGEN SATURATION: 98 % | BODY MASS INDEX: 37.92 KG/M2 | HEART RATE: 49 BPM

## 2021-02-03 DIAGNOSIS — I48.91 ATRIAL FIBRILLATION, UNSPECIFIED TYPE (HCC): ICD-10-CM

## 2021-02-03 DIAGNOSIS — R94.31 ABNORMAL EKG: ICD-10-CM

## 2021-02-03 DIAGNOSIS — I35.0 NONRHEUMATIC AORTIC VALVE STENOSIS: ICD-10-CM

## 2021-02-03 DIAGNOSIS — R06.02 SOB (SHORTNESS OF BREATH): ICD-10-CM

## 2021-02-03 DIAGNOSIS — I10 ESSENTIAL HYPERTENSION: Primary | ICD-10-CM

## 2021-02-03 PROCEDURE — 93000 ELECTROCARDIOGRAM COMPLETE: CPT | Performed by: INTERNAL MEDICINE

## 2021-02-03 PROCEDURE — 93225 XTRNL ECG REC<48 HRS REC: CPT | Performed by: INTERNAL MEDICINE

## 2021-02-03 PROCEDURE — 99215 OFFICE O/P EST HI 40 MIN: CPT | Performed by: INTERNAL MEDICINE

## 2021-02-03 NOTE — TELEPHONE ENCOUNTER
Monitor placed by Kayode Mueller, [address] [place] [address]  Length of monitor 48 hr  Monitor ordered by [facility]  Monitor Number [facility].    Activation successful prior to pt leaving office?  Yes

## 2021-02-03 NOTE — PATIENT INSTRUCTIONS
Plan:    1. Limited Echocardiogram to view size and strength of the heart and aortic stenosis value  2. Stress test to risk stratify for new onset of SOB. 2 days prior do not take atenolol/chlor. 3.  EKG today   4. Labs -  Lipids, CMP, CBC   5. We will call you with the results of the testing   6. Recommend cutting the atenolol/chlorthalidone in 1/2. Your provider has ordered testing for further evaluation. An order/prescription has been included in your paper work. ? To schedule outpatient testing, contact Central Scheduling by calling [address] [place] [address] ([phone]).

## 2021-02-03 NOTE — LETTER
[facility]   Cardiac Consultation    Referring Provider:  Pat Mendoza DO     Chief Complaint   Patient presents with    1 Year Follow Up    Hypertension      Subjective: Sergey Bone presents for 1 year follow up. He c/o TELLEZ with hills today    History of Present Illness:    Sergey Bone is a 79 y.o. male who here for routine cardiac f/u. I originally saw him 1/15/20 to establish care and concern for abnormal EKG felt to be afib. He has PMH of arthritis, gout, DM, HTN, HLD, mild AS, and prostate cancer s/p radiation seeds. He was thought to be in a fib during a [employer] screening (no copy to review). He saw PCP who repeated his EKG on 1/9/2020 which showed sinus stefano; low amplitude P wave; 1st degree A-V block, nonspecific ST changes, cannot r/o prior septal infarct. Most recent ECHO on 1/10/2020 showed EF is 55-60%, mild to mod AS (velocity=2.99m/s; mean pressure=19mmHg). Prior ECHO in 2015 showed an EF of 55% with mild AS, velocity=2.33m, gradient of 11 mmHg. Most recent lexiscan myoview in 8/2015 showed normal myocardial perfusion. Most recent EKG 1/15/20 NSR 66bpm; prolonged first degree AV block; anterior infarct pattern; nonspecific IVCD. Originally from [facility] and is a retired . Today he reports feeling OK overall but c/o SOB with exertion. He states he walks his dog for exercise and notices going up hills he has more SOB. He states this is new since last year. He denies chest pain, palpitations, dizziness or syncope. He is taking his medications as prescribed. Patient with no complaints of chest pain, palpitations, dizziness, edema, or orthopnea/PND. Past Medical History:   has a past medical history of Arthritis, Chronic gout without tophus, Diabetes mellitus ([address] ED (erectile dysfunction), Erectile dysfunction, Hyperlipidemia, Hypertension, Nocturia, Pre-diabetes, and Prostate CA ([address]     Surgical History: has a past surgical history that includes Prostate surgery; Tonsillectomy; Knee arthroscopy; knee surgery (Left, 1963); Colonoscopy (9/24/14); joint replacement (Left, 9/1/2015); and Cataract removal (Bilateral, 03/2017). Social History:   he is  with one son. He is a retired . He drinks alcohol- 2-3 drinks of high balls a day. no drug use. Never smokes. reports that he has never smoked. He has never used smokeless tobacco. He reports current alcohol use. He reports that he does not use drugs. Family History:  family history includes Cancer in his father. No significant heart disease history in parents    Home Medications:  Prior to Admission medications    Medication Sig Start Date End Date Taking? Authorizing Provider   lisinopril (PRINIVIL;ZESTRIL) 20 MG tablet TAKE 1 TABLET BY MOUTH ONE TIME A DAY 8/17/20  Yes Bertha Ceja, DO   metFORMIN (GLUCOPHAGE) 500 MG tablet Take 1 tablet by mouth daily (with breakfast) 8/17/20  Yes Camilla Rios, DO   atenolol-chlorthalidone (TENORETIC) 100-25 MG per tablet Take 1 tablet by mouth daily 8/17/20  Yes Camilla Rios DO   atorvastatin (LIPITOR) 40 MG tablet Take 1 tablet by mouth daily 8/17/20  Yes Camilla Rios,    Multiple Vitamins-Minerals (THERAPEUTIC MULTIVITAMIN-MINERALS) tablet Take 1 tablet by mouth daily   Yes Historical Provider, MD   aspirin 81 MG tablet Take 81 mg by mouth daily. Yes Historical Provider, MD        Allergies:  Pcn [penicillins]     Review of Systems:   · Constitutional: there has been no unanticipated weight loss. There's been no change in energy level, sleep pattern, or activity level. · Eyes: No visual changes or diplopia. No scleral icterus. · ENT: No Headaches, hearing loss or vertigo. No mouth sores or sore throat. · Cardiovascular: Reviewed in HPI  · Respiratory: No cough or wheezing, no sputum production. No hematemesis. · Gastrointestinal: No abdominal pain, appetite loss, blood in stools. No change in bowel or bladder habits. · Genitourinary: No dysuria, trouble voiding, or hematuria. · Musculoskeletal:  No gait disturbance, weakness or joint complaints. · Integumentary: No rash or pruritis. · Neurological: No headache, diplopia, change in muscle strength, numbness or tingling. No change in gait, balance, coordination, mood, affect, memory, mentation, behavior. · Psychiatric: No anxiety, no depression. · Endocrine: No malaise, fatigue or temperature intolerance. No excessive thirst, fluid intake, or urination. No tremor. · Hematologic/Lymphatic: No abnormal bruising or bleeding, blood clots or swollen lymph nodes. · Allergic/Immunologic: No nasal congestion or hives. Physical Examination:    Vitals:    02/03/21 1053   BP: (!) 118/44   Pulse:    SpO2:         Constitutional and General Appearance: NAD   Respiratory:  · Normal excursion and expansion without use of accessory muscles  · Resp Auscultation: Normal breath sounds without dullness  Cardiovascular:  · The apical impulses not displaced  · Heart tones are crisp and normal  · Cervical veins are not engorged  · The carotid upstroke is normal in amplitude and contour without delay or bruit  · Normal S1S2, No S3, II/VI harsh MISTI  · Peripheral pulses are symmetrical and full  · There is no clubbing, cyanosis of the extremities. · 1 + BLE  edema  · Femoral Arteries: 2+ and equal  · Pedal Pulses: 2+ and equal   Abdomen:  · No masses or tenderness  · Liver/Spleen: No Abnormalities Noted  Neurological/Psychiatric:  · Alert and oriented in all spheres  · Moves all extremities well  · Exhibits normal gait balance and coordination  · No abnormalities of mood, affect, memory, mentation, or behavior are noted  Skin:  · Skin: warm and dry.       Assessment: 1. Abnormal EKG: Today's EKG shows possible atrial fibrillation 47bpm; anterior infarct; nonspecific ST change. I want to perform 48 hour holter monitor to evaluate heart rhythm and rate more closely. If definite afib he will need NOAC and we discussed at length risks/benefits of AC. He agrees to proceed if needed. 2. Aortic stenosis: Most recent ECHO on 1/10/2020 showed EF is 55-60%, mild to mod AS (velocity=2.99m/s; mean pressure=19mmHg). These values are c/w mild AS. He had mild AS on 2015 ECHO which has worsened but not significantly. 3. Hypertension: Well controlled and will continue current medical regimen. 4. Hyperlipidemia: per PCP    5. Diabetes mellitus: per PCP     6. SOB: Given new symptom of SOB with walking hills I need to be concerned for worsening valve disease, arrhythmia (possible afib), and/or ischemia with SOB being anginal equivalent. He has multiple CAD risk factors. He merits non-invasive cardiac evaluation with repeat ECHO to reassess AS and nuclear stress study to assess for ischemia. Plan:    1. Limited Echocardiogram to view size and strength of the heart and aortic stenosis value  2. Stress test to risk stratify for new onset of SOB. 2 days prior do not take atenolol/chlor. 3. Today's EKG shows possible atrial fibrillation 47bpm; anterior infarct; nonspecific ST change. 4.  Labs -  Lipids, CMP, CBC   5. We will call you with the results of the testing   6. Recommend cutting the atenolol/chlorthalidone in 1/2.   7. 48 hour heart monitor for possible afib. If monitor confirms will need to start NOAC. See #1 above. This note was scribed in the presence of Dr.Meyers HAWKINS by Cindy Jauregui RN. I, Dr. Jj Begum, personally performed the services described in this documentation, as scribed by the above signed scribe in my presence. It is both accurate and complete to my knowledge. I agree with the details independently gathered by the clinical support staff, while the remaining scribed note accurately describes my personal service to the patient. Cost of prescription medications and patient compliance have been reviewed with patient. All questions answered. Thank you for allowing me to participate in the care of this individual.    Suzan Quinones.  Cailin Morfin M.D., [facility]

## 2021-02-15 PROCEDURE — 93227 XTRNL ECG REC<48 HR R&I: CPT | Performed by: INTERNAL MEDICINE

## 2021-02-23 ENCOUNTER — OFFICE VISIT (OUTPATIENT)
Dept: FAMILY MEDICINE CLINIC | Age: 81
End: 2021-02-23
Payer: MEDICARE

## 2021-02-23 VITALS
OXYGEN SATURATION: 95 % | TEMPERATURE: 97.2 F | WEIGHT: 256 LBS | BODY MASS INDEX: 37.8 KG/M2 | SYSTOLIC BLOOD PRESSURE: 132 MMHG | HEART RATE: 46 BPM | DIASTOLIC BLOOD PRESSURE: 70 MMHG

## 2021-02-23 DIAGNOSIS — Z85.46 HISTORY OF PROSTATE CANCER: ICD-10-CM

## 2021-02-23 DIAGNOSIS — R73.03 PRE-DIABETES: Primary | ICD-10-CM

## 2021-02-23 DIAGNOSIS — E66.01 MORBIDLY OBESE (HCC): ICD-10-CM

## 2021-02-23 DIAGNOSIS — I10 ESSENTIAL HYPERTENSION: ICD-10-CM

## 2021-02-23 DIAGNOSIS — E78.2 MIXED HYPERLIPIDEMIA: ICD-10-CM

## 2021-02-23 DIAGNOSIS — Z11.59 NEED FOR HEPATITIS C SCREENING TEST: ICD-10-CM

## 2021-02-23 LAB — HBA1C MFR BLD: 5.7 %

## 2021-02-23 PROCEDURE — 99214 OFFICE O/P EST MOD 30 MIN: CPT | Performed by: FAMILY MEDICINE

## 2021-02-23 PROCEDURE — 83036 HEMOGLOBIN GLYCOSYLATED A1C: CPT | Performed by: FAMILY MEDICINE

## 2021-02-23 RX ORDER — LISINOPRIL 20 MG/1
TABLET ORAL
Qty: 90 TABLET | Refills: 1 | Status: SHIPPED | OUTPATIENT
Start: 2021-02-23 | End: 2021-03-23

## 2021-02-23 ASSESSMENT — PATIENT HEALTH QUESTIONNAIRE - PHQ9: SUM OF ALL RESPONSES TO PHQ QUESTIONS 1-9: 0

## 2021-02-23 NOTE — PROGRESS NOTES
Arcenio Ellison is a 79 y.o. male    Chief Complaint   Patient presents with    Diabetes    Hypertension    Hyperlipidemia       HPI:    Diabetes  He presents for his follow-up diabetic visit. He has type 2 diabetes mellitus. His disease course has been stable. Pertinent negatives for diabetes include no polydipsia. Risk factors for coronary artery disease include male sex, diabetes mellitus and hypertension. Current diabetic treatment includes oral agent (monotherapy). An ACE inhibitor/angiotensin II receptor blocker is being taken. Hypertension  This is a chronic problem. The current episode started more than 1 year ago. The problem is unchanged. The problem is controlled. Risk factors for coronary artery disease include diabetes mellitus. Past treatments include ACE inhibitors, beta blockers and diuretics. The current treatment provides significant improvement. There are no compliance problems. Hyperlipidemia  This is a chronic problem. The current episode started more than 1 year ago. The problem is controlled. Recent lipid tests were reviewed and are normal. Exacerbating diseases include diabetes. Pertinent negatives include no myalgias. Current antihyperlipidemic treatment includes statins. The current treatment provides significant improvement of lipids. There are no compliance problems. Risk factors for coronary artery disease include diabetes mellitus, hypertension, male sex and obesity. ROS:    Review of Systems   Endocrine: Negative for polydipsia. Musculoskeletal: Negative for myalgias. /70 (Site: Left Upper Arm, Position: Sitting, Cuff Size: Large Adult)   Pulse (!) 46   Temp 97.2 °F (36.2 °C) (Temporal)   Wt 256 lb (116.1 kg)   SpO2 95%   BMI 37.80 kg/m²     Physical Exam:    Physical Exam  Constitutional:       General: He is not in acute distress. Appearance: Normal appearance. He is not ill-appearing. Neurological:      Mental Status: He is alert.    Psychiatric: obese  Encouraged a diet low in saturated fat/cholesterol, small portion sizes and regular exercises. 5.  History of prostate cancer  We will recheck PSA. Return in about 6 months (around 8/23/2021) for Diabetes, Hypertension, Hyperlipidemia.

## 2021-02-24 LAB
HEPATITIS C ANTIBODY INTERPRETATION: NORMAL
PROSTATE SPECIFIC ANTIGEN: 1.85 NG/ML (ref 0–4)

## 2021-03-02 DIAGNOSIS — Z85.46 HISTORY OF PROSTATE CANCER: Primary | ICD-10-CM

## 2021-03-11 ENCOUNTER — HOSPITAL ENCOUNTER (OUTPATIENT)
Dept: CARDIOLOGY | Age: 81
Discharge: HOME | End: 2021-03-11
Payer: MEDICARE

## 2021-03-11 ENCOUNTER — HOSPITAL ENCOUNTER (OUTPATIENT)
Age: 81
End: 2021-03-11
Payer: MEDICARE

## 2021-03-11 ENCOUNTER — HOSPITAL ENCOUNTER (OUTPATIENT)
Age: 81
Discharge: HOME | End: 2021-03-11
Payer: MEDICARE

## 2021-03-11 DIAGNOSIS — R06.02 SOB (SHORTNESS OF BREATH): ICD-10-CM

## 2021-03-11 DIAGNOSIS — I35.0 NONRHEUMATIC AORTIC VALVE STENOSIS: ICD-10-CM

## 2021-03-11 DIAGNOSIS — R94.31 ABNORMAL EKG: ICD-10-CM

## 2021-03-11 DIAGNOSIS — I10 ESSENTIAL HYPERTENSION: ICD-10-CM

## 2021-03-11 LAB
A/G RATIO: 1.4 (ref 1.1–2.2)
ALBUMIN SERPL-MCNC: 4.3 G/DL (ref 3.4–5)
ALP BLD-CCNC: 145 U/L (ref 40–129)
ALT SERPL-CCNC: 21 U/L (ref 10–40)
ANION GAP SERPL CALCULATED.3IONS-SCNC: 16 MMOL/L (ref 3–16)
AST SERPL-CCNC: 24 U/L (ref 15–37)
BASOPHILS ABSOLUTE: 0 K/UL (ref 0–0.2)
BASOPHILS RELATIVE PERCENT: 0.6 %
BILIRUB SERPL-MCNC: 2 MG/DL (ref 0–1)
BUN BLDV-MCNC: 12 MG/DL (ref 7–20)
CALCIUM SERPL-MCNC: 9.5 MG/DL (ref 8.3–10.6)
CHLORIDE BLD-SCNC: 102 MMOL/L (ref 99–110)
CHOLESTEROL, FASTING: 119 MG/DL (ref 0–199)
CO2: 22 MMOL/L (ref 21–32)
CREAT SERPL-MCNC: 0.8 MG/DL (ref 0.8–1.3)
EOSINOPHILS ABSOLUTE: 0.2 K/UL (ref 0–0.6)
EOSINOPHILS RELATIVE PERCENT: 3.7 %
GFR AFRICAN AMERICAN: >60
GFR NON-AFRICAN AMERICAN: >60
GLOBULIN: 3 G/DL
GLUCOSE BLD-MCNC: 125 MG/DL (ref 70–99)
HCT VFR BLD CALC: 37.6 % (ref 40.5–52.5)
HDLC SERPL-MCNC: 39 MG/DL (ref 40–60)
HEMOGLOBIN: 12.7 G/DL (ref 13.5–17.5)
LDL CHOLESTEROL CALCULATED: 65 MG/DL
LYMPHOCYTES ABSOLUTE: 1.3 K/UL (ref 1–5.1)
LYMPHOCYTES RELATIVE PERCENT: 21.8 %
MCH RBC QN AUTO: 27.9 PG (ref 26–34)
MCHC RBC AUTO-ENTMCNC: 33.7 G/DL (ref 31–36)
MCV RBC AUTO: 82.8 FL (ref 80–100)
MONOCYTES ABSOLUTE: 0.4 K/UL (ref 0–1.3)
MONOCYTES RELATIVE PERCENT: 7.2 %
NEUTROPHILS ABSOLUTE: 3.9 K/UL (ref 1.7–7.7)
NEUTROPHILS RELATIVE PERCENT: 66.7 %
PDW BLD-RTO: 15.2 % (ref 12.4–15.4)
PLATELET # BLD: 249 K/UL (ref 135–450)
PMV BLD AUTO: 9 FL (ref 5–10.5)
POTASSIUM SERPL-SCNC: 4 MMOL/L (ref 3.5–5.1)
RBC # BLD: 4.54 M/UL (ref 4.2–5.9)
SODIUM BLD-SCNC: 140 MMOL/L (ref 136–145)
TOTAL PROTEIN: 7.3 G/DL (ref 6.4–8.2)
TRIGLYCERIDE, FASTING: 77 MG/DL (ref 0–150)
VLDLC SERPL CALC-MCNC: 15 MG/DL
WBC # BLD: 5.8 K/UL (ref 4–11)

## 2021-03-11 PROCEDURE — 93308 TTE F-UP OR LMTD: CPT

## 2021-03-11 PROCEDURE — 80061 LIPID PANEL: CPT

## 2021-03-11 PROCEDURE — 3430000000 HC RX DIAGNOSTIC RADIOPHARMACEUTICAL: Performed by: INTERNAL MEDICINE

## 2021-03-11 PROCEDURE — 85025 COMPLETE CBC W/AUTO DIFF WBC: CPT

## 2021-03-11 PROCEDURE — 36415 COLL VENOUS BLD VENIPUNCTURE: CPT

## 2021-03-11 PROCEDURE — A9502 TC99M TETROFOSMIN: HCPCS | Performed by: INTERNAL MEDICINE

## 2021-03-11 PROCEDURE — 80053 COMPREHEN METABOLIC PANEL: CPT

## 2021-03-11 RX ADMIN — TETROFOSMIN 31.3 MILLICURIE: 1.38 INJECTION, POWDER, LYOPHILIZED, FOR SOLUTION INTRAVENOUS at 12:28

## 2021-03-12 ENCOUNTER — HOSPITAL ENCOUNTER (OUTPATIENT)
Dept: CARDIOLOGY | Age: 81
Discharge: HOME | End: 2021-03-12
Payer: MEDICARE

## 2021-03-12 LAB
LV EF: 60 %
LVEF MODALITY: NORMAL

## 2021-03-12 PROCEDURE — 6360000002 HC RX W HCPCS: Performed by: INTERNAL MEDICINE

## 2021-03-12 PROCEDURE — 93017 CV STRESS TEST TRACING ONLY: CPT

## 2021-03-12 PROCEDURE — 3430000000 HC RX DIAGNOSTIC RADIOPHARMACEUTICAL: Performed by: INTERNAL MEDICINE

## 2021-03-12 PROCEDURE — 78452 HT MUSCLE IMAGE SPECT MULT: CPT

## 2021-03-12 PROCEDURE — A9502 TC99M TETROFOSMIN: HCPCS | Performed by: INTERNAL MEDICINE

## 2021-03-12 RX ADMIN — REGADENOSON 0.4 MG: 0.08 INJECTION, SOLUTION INTRAVENOUS at 13:41

## 2021-03-12 RX ADMIN — TETROFOSMIN 29 MILLICURIE: 1.38 INJECTION, POWDER, LYOPHILIZED, FOR SOLUTION INTRAVENOUS at 13:41

## 2021-03-15 ENCOUNTER — TELEPHONE (OUTPATIENT)
Dept: CARDIOLOGY CLINIC | Age: 81
End: 2021-03-15

## 2021-03-22 PROBLEM — E78.00 HYPERCHOLESTEREMIA: Status: ACTIVE | Noted: 2021-03-22

## 2021-03-22 NOTE — PROGRESS NOTES
[facility]    H+P // CONSULT // OUTPATIENT VISIT // FOLLOWUP VISIT     Referring Doctor Erica Glass,    Encounter Type Followup     CHIEF COMPLAINT     Visit Type Chronic   Symptoms SOB, dizziness. Problems AS, HTN, CHOL     HISTORY OF PRESENT ILLNESS     GEN - Doing well. Mild sob with exertion. Occasional dizziness. CAD - Denies cp, sob, dizziness, syncope, palpitations. HTN - Ambulatory BP readings in good range. No HA or dizziness. CHOL - Last cholesterol reviewed and in good range. Tolerating statin without side effects. MED - Compliant with CV meds listed below without notable side effects. HISTORY/ALLERGIES/ROS     MedHx:   has a past medical history of Arthritis, Chronic gout without tophus, Diabetes mellitus ([address] ED (erectile dysfunction), Erectile dysfunction, Hyperlipidemia, Hypertension, Nocturia, Pre-diabetes, and Prostate CA ([address] SurgHx:  has a past surgical history that includes Prostate surgery; Tonsillectomy; Knee arthroscopy; knee surgery (Left, 1963); Colonoscopy (9/24/14); joint replacement (Left, 9/1/2015); and Cataract removal (Bilateral, 03/2017). SocHx:   reports that he has never smoked. He has never used smokeless tobacco. He reports current alcohol use. He reports that he does not use drugs. FamHx:  family history includes Cancer in his father.    Allergies: Pcn [penicillins]   ROS:  [x]Full ROS obtained and negative except as mentioned in HPI     MEDICATIONS      Current Outpatient Medications   Medication Sig Dispense Refill    lisinopril (PRINIVIL;ZESTRIL) 20 MG tablet TAKE 1 TABLET BY MOUTH ONE TIME A DAY 90 tablet 1    apixaban (ELIQUIS) 5 MG TABS tablet Take 1 tablet by mouth 2 times daily 60 tablet 5    metFORMIN (GLUCOPHAGE) 500 MG tablet Take 1 tablet by mouth daily (with breakfast) 90 tablet 3    atenolol-chlorthalidone (TENORETIC) 100-25 MG per tablet Take 1 tablet by mouth daily 90 tablet 3    atorvastatin (LIPITOR) 40 MG tablet Take 1 tablet by mouth daily 90 tablet 3    Multiple Vitamins-Minerals (THERAPEUTIC MULTIVITAMIN-MINERALS) tablet Take 1 tablet by mouth daily       No current facility-administered medications for this visit.       Reviewed with patient and will remain unchanged except as mentioned in A/P  PHYSICAL EXAM     Vitals:    03/23/21 1154   BP: 110/76   Pulse: 56      Gen Alert, coop, no distress Heart  Rrr, 3/6, clear s2   Head NC, AT, no abnorm Abd  Soft, NT, +BS, no mass, no OM   Eyes PER, conj/corn clear Ext  Ext nl, AT, no C/C/E   Nose Nares nl, no drain, NT Pulse 2+ and symmetric   Throat Lips, mucosa, tongue nl Skin Col/text/turg nl, no vis rash/les   Neck S/S, TM, NT, no bruit/JVD Psych Nl mood and affect   Lung CTA-B, unlabored, no DTP Lymph   No cervical or axillary LA   Ch wall NT, no deform Neuro  Nl gross M/S exam     ASSESSMENT AND PLAN     *AS    Date EF Detail   Sx     sob   DATA   Most recent TTE, [facility] reviewed personally   NYHA   II   TTE 8/15  1/20  3/21 55%  60%  65% Mild AS MG 11  Mild to mod AS MG 19, mildly dilated ascending aorta  Mod to severe AS MG 36, Mod Biatrial enlargement    MPI 3/21 60% Normal perfusion   Plan     Moderate by echo and exam, repeat echo in 6 months   *HTN  Status Controlled, vitals and available ambulatory monitoring logs reviewed personally  Plan Counseled on diet/salt/exercise/weight, continue meds at doses above  *CHOL  Status  Controlled with last LDL of 65 (goal <70) and HDL of 39 (3/21), labs reviewed personally  Plan Counseled on diet/exercise/weight, continue high-intensity statin, lipid/liver surveillance per PCP  *COMPLIANCE  Status Compliant  Plan Discussed importance of compliance with meds/diet/salt/exercise; avoid tob/alc/drugs; patient verbalized understanding  *FOLLOWUP  6 months with echo    [address] Matthew Sheppard, am scribing for and in the presence of Jovita Villa MD.   Matthew Rucker 03/22/21 3:56 PM   Provider Wm Hernandez is working as a scribe for and in the presence of regulo Shay MD). Working as a scribe, Matthew Vela may have prepopulated components of this note with my historical  intellectual property under my direct supervision. Any additions to this intellectual property were performed in my presence and at my direction. Furthermore, the content and accuracy of this note have been reviewed by regulo Shay MD).  3/23/2021 12:42 PM  CODING     Category Diagnosis   Stable chronic illness  (82873/10962 - 2 or more) AS, HTN, CHOL   Chronic illness w/: Exac, progr or SA of Tx  (41097/25374 - 1 or more)    Undiagnosed new problem w/: uncertain prognosis  (98951/79766 - 1 or more)    Acute illness with systemic Sx  (39170/07839 - 1 or more)    Acute, complicated injury  (89707/39236 - 1 or more)    66301 1 or more chronic illness with exacerbation, progression or SA of treatment    Time  30-39 minutes spent preparing to see patient including reviewing patient history/prior tests/prior consults, performing a medical exam, counseling and educating patient/family/caregiver, ordering medications/tests/procedures, referring and communicating with PCPs and other pertinent consultants, documenting information in the EMR, independently interpreting results and communicating to family and coordination of patient care.

## 2021-03-23 ENCOUNTER — OFFICE VISIT (OUTPATIENT)
Dept: CARDIOLOGY CLINIC | Age: 81
End: 2021-03-23
Payer: MEDICARE

## 2021-03-23 VITALS
SYSTOLIC BLOOD PRESSURE: 110 MMHG | WEIGHT: 242.5 LBS | DIASTOLIC BLOOD PRESSURE: 76 MMHG | BODY MASS INDEX: 35.92 KG/M2 | HEART RATE: 56 BPM | HEIGHT: 69 IN

## 2021-03-23 DIAGNOSIS — I35.0 NONRHEUMATIC AORTIC VALVE STENOSIS: Primary | ICD-10-CM

## 2021-03-23 DIAGNOSIS — E78.00 HYPERCHOLESTEREMIA: ICD-10-CM

## 2021-03-23 DIAGNOSIS — I10 ESSENTIAL HYPERTENSION: ICD-10-CM

## 2021-03-23 PROCEDURE — 99214 OFFICE O/P EST MOD 30 MIN: CPT | Performed by: INTERNAL MEDICINE

## 2021-03-23 RX ORDER — LISINOPRIL 20 MG/1
TABLET ORAL
Qty: 90 TABLET | Refills: 1 | Status: SHIPPED | OUTPATIENT
Start: 2021-03-23 | End: 2021-03-23

## 2021-03-23 RX ORDER — LISINOPRIL 20 MG/1
TABLET ORAL
Qty: 90 TABLET | Refills: 1 | Status: SHIPPED | OUTPATIENT
Start: 2021-03-23 | End: 2021-11-18 | Stop reason: SDUPTHER

## 2021-04-28 ENCOUNTER — TELEPHONE (OUTPATIENT)
Dept: FAMILY MEDICINE CLINIC | Age: 81
End: 2021-04-28

## 2021-04-28 DIAGNOSIS — G89.29 CHRONIC KNEE PAIN, UNSPECIFIED LATERALITY: Primary | ICD-10-CM

## 2021-04-28 DIAGNOSIS — M25.569 CHRONIC KNEE PAIN, UNSPECIFIED LATERALITY: Primary | ICD-10-CM

## 2021-04-29 NOTE — TELEPHONE ENCOUNTER
External referral to orthopedic surgery placed. That provider is no longer with [facility].

## 2021-04-29 NOTE — TELEPHONE ENCOUNTER
Spoke with him and let him know Dr. Tammy Muhammad isn't with [facility] anymore. He said he would get back with us about another referral for a [facility].

## 2021-04-29 NOTE — TELEPHONE ENCOUNTER
I went ahead and placed a referral to the [facility] orthopedic surgery who works on knees. He can choose either one.      Angelica Leigh MD  [address] [facility], [address] [place] [address] Susi Munguia    [phone]

## 2021-04-30 ENCOUNTER — NURSE TRIAGE (OUTPATIENT)
Dept: OTHER | Facility: CLINIC | Age: 81
End: 2021-04-30

## 2021-04-30 NOTE — TELEPHONE ENCOUNTER
Received call from [place] at [facility]) Yoko with Red Flag Complaint. Brief description of triage: Pt states L knee pain since laying in bed and bending it a couple of nights ago. Pt denies pain when not moving joint. Pt states pain is worse when bending or putting on socks. Pt states swelling on L side of knee, but denies redness. Triage indicates for patient to be seen by PCP within 3 days. Care advice provided, patient verbalizes understanding; denies any other questions or concerns; instructed to call back for any new or worsening symptoms. Writer provided warm transfer to [place] at [facility] for appointment scheduling. Attention Provider: Thank you for allowing me to participate in the care of your patient. The patient was connected to triage in response to information provided to the [facility]. Please do not respond through this encounter as the response is not directed to a shared pool. Reason for Disposition   Swollen knee joint (no fever or redness)    Answer Assessment - Initial Assessment Questions  1. LOCATION and RADIATION: \"Where is the pain located? \"       Wife states L knee on outside of knee    2. QUALITY: \"What does the pain feel like? \"  (e.g., sharp, dull, aching, burning)      Pt denies pain right now. States it's only when bending or moving it    3. SEVERITY: \"How bad is the pain? \" \"What does it keep you from doing? \"   (Scale 1-10; or mild, moderate, severe)    -  MILD (1-3): doesn't interfere with normal activities     -  MODERATE (4-7): interferes with normal activities (e.g., work or school) or awakens from sleep, limping     -  SEVERE (8-10): excruciating pain, unable to do any normal activities, unable to walk      Pt denies pain right now. States 7/10 when bending knee    4. ONSET: \"When did the pain start? \" \"Does it come and go, or is it there all the time? \"      Pt states a couple of nights ago while laying in bed    5.  RECURRENT: \"Have you had this pain before? \" If so, ask: \"When, and what happened then? \"      Denies    6. SETTING: \"Has there been any recent work, exercise or other activity that involved that part of the body? \"       Bending when sleeping    7. AGGRAVATING FACTORS: \"What makes the knee pain worse? \" (e.g., walking, climbing stairs, running)      Bending or moving knee    8. ASSOCIATED SYMPTOMS: \"Is there any swelling or redness of the knee? \"      Swelling on L side of knee only    9. OTHER SYMPTOMS: \"Do you have any other symptoms? \" (e.g., chest pain, difficulty breathing, fever, calf pain)      Denies     10. PREGNANCY: \"Is there any chance you are pregnant? \" \"When was your last menstrual period? \"        N/A    Protocols used: KNEE PAIN-ADULT-[place]

## 2021-05-03 ENCOUNTER — OFFICE VISIT (OUTPATIENT)
Dept: FAMILY MEDICINE CLINIC | Age: 81
End: 2021-05-03
Payer: MEDICARE

## 2021-05-03 ENCOUNTER — TELEPHONE (OUTPATIENT)
Dept: ORTHOPEDIC SURGERY | Age: 81
End: 2021-05-03

## 2021-05-03 VITALS
SYSTOLIC BLOOD PRESSURE: 142 MMHG | HEART RATE: 71 BPM | DIASTOLIC BLOOD PRESSURE: 74 MMHG | OXYGEN SATURATION: 98 % | WEIGHT: 236 LBS | BODY MASS INDEX: 34.85 KG/M2

## 2021-05-03 DIAGNOSIS — G89.29 CHRONIC PAIN OF LEFT KNEE: Primary | ICD-10-CM

## 2021-05-03 DIAGNOSIS — M25.562 CHRONIC PAIN OF LEFT KNEE: Primary | ICD-10-CM

## 2021-05-03 PROCEDURE — 99213 OFFICE O/P EST LOW 20 MIN: CPT | Performed by: FAMILY MEDICINE

## 2021-05-03 NOTE — PROGRESS NOTES
Sameera Shane is a 79 y.o. male    Chief Complaint   Patient presents with    Knee Pain     Left knee pain        HPI:    Knee Pain   The incident occurred more than 1 week ago. The incident occurred at home. The injury mechanism was a twisting injury. The pain is present in the left knee. The pain has been worsening since onset. Pertinent negatives include no numbness or tingling. The symptoms are aggravated by movement. He has tried immobilization for the symptoms. ROS:    Review of Systems   Neurological: Negative for tingling and numbness. BP (!) 142/74 (Site: Right Upper Arm, Position: Sitting, Cuff Size: Large Adult)   Pulse 71   Wt 236 lb (107 kg)   SpO2 98%   BMI 34.85 kg/m²     Physical Exam:    Physical Exam  Constitutional:       General: He is not in acute distress. Appearance: Normal appearance. He is obese. He is not ill-appearing or toxic-appearing. HENT:      Head: Normocephalic. Musculoskeletal:      Left knee: He exhibits swelling. He exhibits normal range of motion. Tenderness found. Lateral joint line tenderness noted. Neurological:      Mental Status: He is alert. Psychiatric:         Mood and Affect: Mood normal.         Behavior: Behavior normal.         Thought Content:  Thought content normal.         Current Outpatient Medications   Medication Sig Dispense Refill    lisinopril (PRINIVIL;ZESTRIL) 20 MG tablet TAKE 1/2 TABLET BY MOUTH ONE TIME A DAY 90 tablet 1    apixaban (ELIQUIS) 5 MG TABS tablet Take 1 tablet by mouth 2 times daily 60 tablet 5    metFORMIN (GLUCOPHAGE) 500 MG tablet Take 1 tablet by mouth daily (with breakfast) 90 tablet 3    atenolol-chlorthalidone (TENORETIC) 100-25 MG per tablet Take 1 tablet by mouth daily (Patient taking differently: Take 0.5 tablets by mouth daily ) 90 tablet 3    atorvastatin (LIPITOR) 40 MG tablet Take 1 tablet by mouth daily 90 tablet 3    Multiple Vitamins-Minerals (THERAPEUTIC MULTIVITAMIN-MINERALS) tablet Take 1 tablet by mouth daily       No current facility-administered medications for this visit. Assessment:    1. Chronic pain of left knee        Plan:    1. Chronic pain of left knee  History of total knee replacement. Needs to see Ortho. Referral given. Patient to return to clinic if symptoms worsen or fail to improve.

## 2021-05-06 ENCOUNTER — OFFICE VISIT (OUTPATIENT)
Dept: ORTHOPEDIC SURGERY | Age: 81
End: 2021-05-06
Payer: MEDICARE

## 2021-05-06 VITALS — HEIGHT: 69 IN | WEIGHT: 236 LBS | BODY MASS INDEX: 34.96 KG/M2

## 2021-05-06 DIAGNOSIS — M16.12 PRIMARY OSTEOARTHRITIS OF LEFT HIP: ICD-10-CM

## 2021-05-06 DIAGNOSIS — Z96.652 HISTORY OF TOTAL KNEE ARTHROPLASTY, LEFT: ICD-10-CM

## 2021-05-06 DIAGNOSIS — M25.552 LEFT HIP PAIN: ICD-10-CM

## 2021-05-06 DIAGNOSIS — M25.562 LEFT KNEE PAIN, UNSPECIFIED CHRONICITY: Primary | ICD-10-CM

## 2021-05-06 PROCEDURE — 99204 OFFICE O/P NEW MOD 45 MIN: CPT | Performed by: ORTHOPAEDIC SURGERY

## 2021-05-06 PROCEDURE — 20611 DRAIN/INJ JOINT/BURSA W/US: CPT | Performed by: ORTHOPAEDIC SURGERY

## 2021-05-06 RX ORDER — LIDOCAINE HYDROCHLORIDE 10 MG/ML
10 INJECTION, SOLUTION INFILTRATION; PERINEURAL ONCE
Status: COMPLETED | OUTPATIENT
Start: 2021-05-06 | End: 2021-05-06

## 2021-05-06 RX ORDER — TRIAMCINOLONE ACETONIDE 40 MG/ML
80 INJECTION, SUSPENSION INTRA-ARTICULAR; INTRAMUSCULAR ONCE
Status: COMPLETED | OUTPATIENT
Start: 2021-05-06 | End: 2021-05-06

## 2021-05-06 RX ORDER — BUPIVACAINE HYDROCHLORIDE 2.5 MG/ML
2.5 INJECTION, SOLUTION INFILTRATION; PERINEURAL ONCE
Status: COMPLETED | OUTPATIENT
Start: 2021-05-06 | End: 2021-05-06

## 2021-05-06 RX ADMIN — TRIAMCINOLONE ACETONIDE 80 MG: 40 INJECTION, SUSPENSION INTRA-ARTICULAR; INTRAMUSCULAR at 14:09

## 2021-05-06 RX ADMIN — BUPIVACAINE HYDROCHLORIDE 2.5 MG: 2.5 INJECTION, SOLUTION INFILTRATION; PERINEURAL at 14:08

## 2021-05-06 RX ADMIN — LIDOCAINE HYDROCHLORIDE 10 MG: 10 INJECTION, SOLUTION INFILTRATION; PERINEURAL at 14:09

## 2021-05-06 NOTE — PROGRESS NOTES
Current Outpatient Medications on File Prior to Visit   Medication Sig Dispense Refill    lisinopril (PRINIVIL;ZESTRIL) 20 MG tablet TAKE 1/2 TABLET BY MOUTH ONE TIME A DAY 90 tablet 1    apixaban (ELIQUIS) 5 MG TABS tablet Take 1 tablet by mouth 2 times daily 60 tablet 5    metFORMIN (GLUCOPHAGE) 500 MG tablet Take 1 tablet by mouth daily (with breakfast) 90 tablet 3    atenolol-chlorthalidone (TENORETIC) 100-25 MG per tablet Take 1 tablet by mouth daily (Patient taking differently: Take 0.5 tablets by mouth daily ) 90 tablet 3    atorvastatin (LIPITOR) 40 MG tablet Take 1 tablet by mouth daily 90 tablet 3    Multiple Vitamins-Minerals (THERAPEUTIC MULTIVITAMIN-MINERALS) tablet Take 1 tablet by mouth daily       No current facility-administered medications on file prior to visit. ASCVD 10-YEAR RISK SCORE  The ASCVD Risk score (Jasmine Davis, et al., 2013) failed to calculate for the following reasons: The valid total cholesterol range is 130 to 320 mg/dL   . Review of Systems  10-point ROS is negative other than HPI. Physical Exam  Based off 1997 Exam Criteria  Ht 5' 9\" (1.753 m)   Wt 236 lb (107 kg)   BMI 34.85 kg/m²      Constitutional:       General: He is not in acute distress. Appearance: Normal appearance. obese  Cardiovascular:      Rate and Rhythm: Normal rate and regular rhythm. Pulses: Normal pulses. Pulmonary:      Effort: Pulmonary effort is normal. No respiratory distress. Neurological:      Mental Status: He is alert and oriented to person, place, and time. Mental status is at baseline.      Musculoskeletal:  Gait:  antalgic    Spine / Hip Exam:      RIGHT  LEFT    Lumbar Spine Exam  [x] All Neg    [x] All Neg     Straight leg raise  []  []Not tested   []  []Not tested    Clonus  []  []Not tested   []  []Not tested    Pain with motion  []  []Not tested   []  []Not tested    Radiculopathy  []  []Not tested   []  []Not tested    Paraspinal muscle tenderness  [] 3 osteoarthritis, joint space narrowing, large cam lesion with osteophytes, cysts. Left knee: Mima implants in position, no evidence of fractures or dislocations. No effusion on x-ray. Assessment and Plan  Roula Diaz was seen today for knee pain. Diagnoses and all orders for this visit:    Left knee pain, unspecified chronicity  -     Cancel: XR KNEE LEFT (MIN 4 VIEWS); Future  -     XR KNEE LEFT (3 VIEWS); Future    History of total knee arthroplasty, left  -     XR KNEE LEFT (3 VIEWS); Future    Left hip pain  -     XR HIP LEFT (2-3 VIEWS); Future    Primary osteoarthritis of left hip  -     US ARTHR/ASP/INJ MAJOR JNT/BURSA LEFT; Future    Other orders  -     bupivacaine (MARCAINE) 0.25 % injection 2.5 mg  -     lidocaine 1 % injection 10 mg  -     triamcinolone acetonide (KENALOG-40) injection 80 mg  -     Cancel: Coty Leiva MD, Orthopedic Surgery, [facility]        I discussed with Chelsea Jorge Alberto that his history, symptoms, signs and imaging are most consistent with femoro-acetabular impingement, hip arthritis and Status post left knee replacement    We reviewed the natural history of these conditions and treatment options ranging from conservative measures (rest, icing, activity modification, physical therapy, pain meds, cortisone injection) to surgical options. In terms of treatment, I recommended continuing with rest, icing, avoidance of painful activities, NSAIDs or pain meds as tolerated, and home therapy. Left Hip Cortisone Injection - Ultrasound-guided CPT: 60351  Consent was obtained after discussion of the risks, benefits, alternatives, including, but not limited to bleeding, pain, infection, skin disruption or discoloration. Laterality was confirmed (timeout). The hip was prepped with alcohol. Deep ultrasound was used to visualize the femoral head, neck, and acetabular rim. 22-gauge spinal needle was used.  Ultrasound-guided needle localization to the hip joint was performed. A formulation of 2cc of 40mg/ml Kenalog, 1cc of 1% lidocaine, 1cc of 0.25% sensoricaine was injected into the hip. Appropriate insufflation deep to the hip capsule was visualized. The site was cleaned and dressed with a band aid. He tolerated this well and there were no complications. 65% of her pain was relieved after injection. We discussed surgical options as well, should conservative measures fail. I think his knee pain is attributed to his left hip arthritis. He may be a candidate for hip replacement surgery when he is ready. I will see him back in 3 months. Electronically signed by Christophe Rdz MD on 5/6/2021 at 5:18 PM  This dictation was generated by voice recognition computer software. Although all attempts are made to edit the dictation for accuracy, there may be errors in the transcription that are not intended.

## 2021-07-30 RX ORDER — APIXABAN 5 MG/1
TABLET, FILM COATED ORAL
Qty: 60 TABLET | Refills: 0 | Status: SHIPPED | OUTPATIENT
Start: 2021-07-30 | End: 2021-08-30

## 2021-08-19 ENCOUNTER — OFFICE VISIT (OUTPATIENT)
Dept: ORTHOPEDIC SURGERY | Age: 81
End: 2021-08-19
Payer: MEDICARE

## 2021-08-19 VITALS — BODY MASS INDEX: 34.96 KG/M2 | WEIGHT: 236 LBS | HEIGHT: 69 IN

## 2021-08-19 DIAGNOSIS — M25.552 LEFT HIP PAIN: ICD-10-CM

## 2021-08-19 DIAGNOSIS — M16.12 PRIMARY OSTEOARTHRITIS OF LEFT HIP: Primary | ICD-10-CM

## 2021-08-19 DIAGNOSIS — Z96.652 HISTORY OF TOTAL KNEE ARTHROPLASTY, LEFT: ICD-10-CM

## 2021-08-19 PROCEDURE — 99213 OFFICE O/P EST LOW 20 MIN: CPT | Performed by: ORTHOPAEDIC SURGERY

## 2021-08-19 NOTE — PROGRESS NOTES
Dr Wilbur Gabriel      Date /Time 8/19/2021       5:18 PM EDT  Name Kerri Fajardo             1940   Location  [facility]  MRN [id number]                No chief complaint on file. Left hip pain groin pain    History of Present Illness    Kerri Fajardo is a 79 y.o. male who presents with  left knee pain. Also having mild groin pain    Current history: Patient was given an intra-articular cortisone injection into his left hip for osteoarthritis at his last visit. At the time of visit he reported a 65% decrease in his left knee and hip pain. Today he reports that his pain symptoms are greatly improved both in his knee and hip. He is very happy with his progress. He is due for possible cardiac valve replacement in the near future. Previous History: Really presents with left knee pain. He had a left knee replacement done by Dr. Patricia Cuadra in 2015. Surgery went largely uneventfully. 1 week ago he started tight shoes and felt acute pain. Since that point, he has significant trouble tying his shoes and getting in and out of a car. He has difficult time rotating his hip. His main symptom however still remains left knee pain.     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    Diabetes mellitus ([address]     ED (erectile dysfunction)     Erectile dysfunction 2/2/2016    Hyperlipidemia     Hypertension     Nocturia     Pre-diabetes     Prostate CA ([address]     seeds implanted approx 2004     Past Surgical History:   Procedure Laterality Date    CATARACT REMOVAL Bilateral 03/2017    COLONOSCOPY  9/24/14    multiple polyp    JOINT REPLACEMENT Left 9/1/2015    left total knee replacement    KNEE ARTHROSCOPY      left knee    KNEE SURGERY Left 1963    PROSTATE SURGERY      TONSILLECTOMY       Family History   Problem Relation Age of Onset    Cancer Father         leukemia     Social History     Tobacco Use    Smoking status: Never Smoker    Smokeless tobacco: Never Used   Substance Use Topics    Alcohol use: Yes     Comment: occ      Current Outpatient Medications on File Prior to Visit   Medication Sig Dispense Refill    ELIQUIS 5 MG TABS tablet TAKE 1 TABLET BY MOUTH TWO TIMES A DAY  60 tablet 0    lisinopril (PRINIVIL;ZESTRIL) 20 MG tablet TAKE 1/2 TABLET BY MOUTH ONE TIME A DAY 90 tablet 1    metFORMIN (GLUCOPHAGE) 500 MG tablet Take 1 tablet by mouth daily (with breakfast) 90 tablet 3    atenolol-chlorthalidone (TENORETIC) 100-25 MG per tablet Take 1 tablet by mouth daily (Patient taking differently: Take 0.5 tablets by mouth daily ) 90 tablet 3    atorvastatin (LIPITOR) 40 MG tablet Take 1 tablet by mouth daily 90 tablet 3    Multiple Vitamins-Minerals (THERAPEUTIC MULTIVITAMIN-MINERALS) tablet Take 1 tablet by mouth daily       No current facility-administered medications on file prior to visit. ASCVD 10-YEAR RISK SCORE  The ASCVD Risk score (Marilynn Eastman, et al., 2013) failed to calculate for the following reasons: The valid total cholesterol range is 130 to 320 mg/dL   . Review of Systems  10-point ROS is negative other than HPI. Physical Exam  Based off 1997 Exam Criteria  There were no vitals taken for this visit. Constitutional:       General: He is not in acute distress. Appearance: Normal appearance. obese  Cardiovascular:      Rate and Rhythm: Normal rate and regular rhythm. Pulses: Normal pulses. Pulmonary:      Effort: Pulmonary effort is normal. No respiratory distress. Neurological:      Mental Status: He is alert and oriented to person, place, and time. Mental status is at baseline.      Musculoskeletal:  Gait:  antalgic    Spine / Hip Exam:      RIGHT  LEFT    Lumbar Spine Exam  [x] All Neg    [x] All Neg     Straight leg raise  []  []Not tested   []  []Not tested    Clonus  []  []Not tested   []  []Not tested    Pain with motion  []  []Not tested   []  []Not tested    Radiculopathy []  []Not tested   []  []Not tested    Paraspinal muscle tenderness  [] Paraspinal  []Midline   [] Paraspinal  []Midline   Sensation RIGHT  LEFT    L3  [x] Normal []Decreased    [x] Normal []Decreased   L4  [x] Normal  []Decreased   [x] Normal []Decreased   L5  [x] Normal []Decreased   [x] Normal []Decreased   S1  [x] Normal  []Decreased   [x] Normal []Decreased   Pelvis       Scoliosis  [x] Nml  [] Present     Leg-length discrepency  [x] Equal  [] Right longer   [] Left longer   Range of Motion Active Passive Active Passive   Hip Flexion 90  80    Abduction 40  40    External Rotation @ 90 flex 40  40    Internal Rotation @ 90 flex 0  -10           Hip Impingement / Dysplasia  [] All Neg  [] Not tested   [] All Neg  [] Not tested    Hip impingement test  []  []Not tested   [x]  []Not tested    C-sign  []  []Not tested   [x]  []Not tested    Anterior instability apprehension  []  []Not tested   []  []Not tested    Posterior instability apprehension  []  []Not tested   []  []Not tested    Uncontained Internal rotation  []  []Not tested  []  []Not tested          Abductors  [x] All Neg  [] Not tested   [x] All Neg  [] Not tested    Medius strength  []  []Not tested   []  []Not tested    Minimum strength  []  []Not tested   []  []Not tested    IT band tendonitis  []  []Not tested   []  []Not tested    Trochanteric tenderness  []  []Not tested  []  []Not tested   Sciatic neuropathic pain  []  []Not tested   []  []Not tested           Post-arthroplasty  [] All Neg  [] Not tested   [] All Neg  [] Not tested    Rectus tendonitis  []  []Not tested   []  []Not tested    Iliopsoas tendonitis       Start-up pain  []  []Not tested   []  []Not tested      Markedly positive Stinchfield sign and reduced range of motion with impingement signs. Previous Left knee exam: Well-healed anterior based incision. No erythema fluctuance or crepitus. Stable ligamentous exam.  Range of motion 5-95 without pain.   No point tenderness or effusion present. Imaging    Left Hip: [address] [facility]  Previous Radiographs: Grade 3 osteoarthritis, joint space narrowing, large cam lesion with osteophytes, cysts. Previous Left knee: Mima implants in position, no evidence of fractures or dislocations. No effusion on x-ray. Assessment and Plan  Diagnoses and all orders for this visit:    Primary osteoarthritis of left hip    History of total knee arthroplasty, left    Left hip pain        I discussed with Griffith Seip that his history, symptoms, signs and imaging are most consistent with femoro-acetabular impingement, hip arthritis and Status post left knee replacement      We reviewed the natural history of these conditions and treatment options ranging from conservative measures (rest, icing, activity modification, physical therapy, pain meds, cortisone injection) to surgical options. In terms of treatment, I recommended continuing with rest, icing, avoidance of painful activities, NSAIDs or pain meds as tolerated, and physical therapy. If these are not effective, cortisone injection can be considered. We discussed surgical options as well, should conservative measures fail. We discussed surgical options as well, should conservative measures fail. I think his knee pain is attributed to his left hip arthritis. He may be a candidate for hip replacement surgery when he is ready. I will see him back in 3 months. Electronically signed by Brie Oropeza MD on 8/19/2021 at 1:11 PM  This dictation was generated by voice recognition computer software. Although all attempts are made to edit the dictation for accuracy, there may be errors in the transcription that are not intended.

## 2021-08-21 DIAGNOSIS — E78.2 MIXED HYPERLIPIDEMIA: ICD-10-CM

## 2021-08-23 RX ORDER — ATORVASTATIN CALCIUM 40 MG/1
TABLET, FILM COATED ORAL
Qty: 90 TABLET | Refills: 1 | Status: SHIPPED | OUTPATIENT
Start: 2021-08-23 | End: 2022-02-11

## 2021-08-23 NOTE — TELEPHONE ENCOUNTER
Refill Request     Last Seen: Last Seen Department: 5/3/2021  Last Seen by PCP: 5/3/2021    Last Written: 8/17/2020    Next Appointment:   Future Appointments   Date Time Provider Jeb Pedro   9/2/2021 11:00 AM DO TEE Juan Cinci - DYD   9/7/2021  9:00 AM [facility] ECHO ROOM [facility] AND CAR Aranza Gain   9/7/2021  9:45 AM MD Demi Vasquez [facility]       Future appointment scheduled      Requested Prescriptions     Pending Prescriptions Disp Refills    atorvastatin (LIPITOR) 40 MG tablet [Pharmacy Med Name: Atorvastatin Calcium Oral Tablet 40 MG] 90 tablet 1     Sig: TAKE 1 TABLET BY MOUTH ONE TIME A DAY

## 2021-08-30 RX ORDER — APIXABAN 5 MG/1
TABLET, FILM COATED ORAL
Qty: 60 TABLET | Refills: 5 | Status: SHIPPED | OUTPATIENT
Start: 2021-08-30 | End: 2021-09-07 | Stop reason: SDUPTHER

## 2021-08-31 NOTE — PROGRESS NOTES
[facility]    H+P // CONSULT // OUTPATIENT VISIT // Meredith Lighter, DO   ENC TYPE Followup     CHIEF COMPLAINT     TYPE Chronic   SX None   PROBS AS, HTN, CHOL     HISTORY OF PRESENT ILLNESS     GEN Doing well. AS moderate by echo today. CAD Denies cp, sob, dizziness, syncope, palpitations   HTN Ambulatory BP in good range, no ha or dizziness. CHOL Last chol in good range, tolerating statin without sa. MED Compliant with CV meds listed below without reported side effects. HISTORY/ALLERGIES/ROS     MedHx:  has a past medical history of Arthritis, Chronic gout without tophus, Diabetes mellitus ([facility] [address] ED (erectile dysfunction), Erectile dysfunction, Hyperlipidemia, Hypertension, Nocturia, Pre-diabetes, and Prostate CA ([facility] [address] SurgHx:  has a past surgical history that includes Prostate surgery; Tonsillectomy; Knee arthroscopy; knee surgery (Left, 1963); Colonoscopy (9/24/14); joint replacement (Left, 9/1/2015); and Cataract removal (Bilateral, 03/2017). SocHx:  reports that he has never smoked. He has never used smokeless tobacco. He reports current alcohol use. He reports that he does not use drugs. FamHx: family history includes Cancer in his father.    Allerg: Pcn [penicillins]   ROS: [x]Full ROS obtained and negative except as mentioned in HPI     MEDICATIONS      Current Outpatient Medications   Medication Sig Dispense Refill    ELIQUIS 5 MG TABS tablet TAKE 1 TABLET BY MOUTH TWO TIMES A DAY  60 tablet 5    atorvastatin (LIPITOR) 40 MG tablet TAKE 1 TABLET BY MOUTH ONE TIME A DAY  90 tablet 1    lisinopril (PRINIVIL;ZESTRIL) 20 MG tablet TAKE 1/2 TABLET BY MOUTH ONE TIME A DAY 90 tablet 1    metFORMIN (GLUCOPHAGE) 500 MG tablet Take 1 tablet by mouth daily (with breakfast) 90 tablet 3    atenolol-chlorthalidone (TENORETIC) 100-25 MG per tablet Take 1 tablet by mouth daily (Patient taking differently: Take 0.5 tablets by mouth daily ) 90 tablet 3    Multiple Vitamins-Minerals (THERAPEUTIC MULTIVITAMIN-MINERALS) tablet Take 1 tablet by mouth daily       No current facility-administered medications for this visit.      Reviewed with patient and will remain unchanged except as mentioned in A/P  PHYSICAL EXAM     Vitals:    09/07/21 0944   BP: 132/72   Pulse: 52   SpO2: 96%      Gen Alert, coop, no distress Heart  Rrr, 3/6   Head NC, AT, no abnorm Abd  Soft, NT, +BS, no mass, no OM   Eyes PER, conj/corn clear Ext  Ext nl, AT, no C/C/E   Nose Nares nl, no drain, NT Pulse 2+ and symmetric   Throat Lips, mucosa, tongue nl Skin Col/text/turg nl, no vis rash/les   Neck S/S, TM, NT, no bruit/JVD Psych Nl mood and affect   Lung CTA-B, unlabored, no DTP Lymph   No cervical or axillary LA   Ch wall NT, no deform Neuro  Nl gross M/S exam     ASSESSMENT AND PLAN     *AS    Date EF Detail   Sx     sob   DATA   Most recent TTE, [facility] reviewed personally   NYHA   II   TTE 8/15  1/20  3/21  9/21 55%  60%  65% Mild AS MG 11  Mild to mod AS MG 19, mildly dilated ascending aorta  Mod to severe AS MG 36, Mod Biatrial enlargement    MPI 3/21 60% Normal perfusion   Plan     Moderate by echo and exam, repeat echo in 6 months   *AFIB  Status chronic, most recent TTE, monitors, EP procedures reviewed personally   Plan Eliquis per Dr. Samuel Guzman  *HTN  Status Controlled, vitals and available ambulatory monitoring logs reviewed personally  Plan Counseled on diet/salt/exercise/weight, continue meds at doses above  *CHOL  Status  Controlled with last LDL of 65 (goal <70) and HDL of 39 (3/21), labs reviewed personally  Plan Counseled on diet/exercise/weight, continue high-intensity statin, lipid/liver surveillance per PCP  *COMPLIANCE  Status Compliant  Plan Discussed importance of compliance with meds/diet/salt/exercise; avoid tob/alc/drugs; patient verbalized understanding  *FOLLOWUP  6 months with echo    [address] Izabela Sheppard am scribing for and in the presence of Bubba Mcdonald MD. Emilia Mariam 08/31/21 9:21 AM   Provider Nora Jang is working as a scribe for and in the presence of me Javier Valdez MD). Working as a scribe, Leydi Clay may have prepopulated components of this note with my historical  intellectual property under my direct supervision. Any additions to this intellectual property were performed in my presence and at my direction. Furthermore, the content and accuracy of this note have been reviewed by me Javier Valdez MD).  9/7/2021 9:47 AM  CODING     Category Diagnosis   Stable chronic illness  (34348/25465 - 2 or more) AS, HTN, CHOL   Chronic illness w/: Exac, progr or SA of Tx  (29948/72115 - 1 or more)    Undiagnosed new problem w/: uncertain prognosis  (35315/37617 - 1 or more)    Acute illness with systemic Sx  (62038/01952 - 1 or more)    Acute, complicated injury  (86090/02119 - 1 or more)    75344 1 or more chronic illness with exacerbation, progression or SA of treatment    Time  30-39 minutes spent preparing to see patient including reviewing patient history/prior tests/prior consults, performing a medical exam, counseling and educating patient/family/caregiver, ordering medications/tests/procedures, referring and communicating with PCPs and other pertinent consultants, documenting information in the EMR, independently interpreting results and communicating to family and coordination of patient care.

## 2021-09-02 ENCOUNTER — OFFICE VISIT (OUTPATIENT)
Dept: FAMILY MEDICINE CLINIC | Age: 81
End: 2021-09-02
Payer: MEDICARE

## 2021-09-02 VITALS
HEART RATE: 41 BPM | DIASTOLIC BLOOD PRESSURE: 72 MMHG | WEIGHT: 228.8 LBS | BODY MASS INDEX: 33.89 KG/M2 | HEIGHT: 69 IN | SYSTOLIC BLOOD PRESSURE: 152 MMHG | OXYGEN SATURATION: 98 %

## 2021-09-02 DIAGNOSIS — I10 ESSENTIAL HYPERTENSION: ICD-10-CM

## 2021-09-02 DIAGNOSIS — E78.2 MIXED HYPERLIPIDEMIA: ICD-10-CM

## 2021-09-02 DIAGNOSIS — R73.03 PRE-DIABETES: Primary | ICD-10-CM

## 2021-09-02 LAB — HBA1C MFR BLD: 5.3 %

## 2021-09-02 PROCEDURE — 99214 OFFICE O/P EST MOD 30 MIN: CPT | Performed by: FAMILY MEDICINE

## 2021-09-02 PROCEDURE — 83036 HEMOGLOBIN GLYCOSYLATED A1C: CPT | Performed by: FAMILY MEDICINE

## 2021-09-02 NOTE — PROGRESS NOTES
Arthur Zendejas is a 79 y.o. male    Chief Complaint   Patient presents with    Diabetes    Hypertension    Hyperlipidemia       HPI:    Diabetes  He presents for his follow-up diabetic visit. He has type 2 diabetes mellitus. His disease course has been stable. Pertinent negatives for hypoglycemia include no dizziness. Pertinent negatives for diabetes include no polydipsia. Risk factors for coronary artery disease include male sex, diabetes mellitus and hypertension. Current diabetic treatment includes oral agent (monotherapy). An ACE inhibitor/angiotensin II receptor blocker is being taken. Hypertension  This is a chronic problem. The current episode started more than 1 year ago. The problem is unchanged. The problem is controlled. Risk factors for coronary artery disease include diabetes mellitus. Past treatments include ACE inhibitors, beta blockers and diuretics. The current treatment provides significant improvement. There are no compliance problems. Hyperlipidemia  This is a chronic problem. The current episode started more than 1 year ago. The problem is controlled. Recent lipid tests were reviewed and are normal. Exacerbating diseases include diabetes. Pertinent negatives include no myalgias. Current antihyperlipidemic treatment includes statins. The current treatment provides significant improvement of lipids. There are no compliance problems. Risk factors for coronary artery disease include diabetes mellitus, hypertension, male sex and obesity. ROS:    Review of Systems   Endocrine: Negative for polydipsia. Musculoskeletal: Negative for myalgias. Neurological: Negative for dizziness. BP (!) 152/72 (Site: Left Upper Arm, Position: Sitting, Cuff Size: Medium Adult)   Pulse (!) 41   Ht 5' 9\" (1.753 m)   Wt 228 lb 12.8 oz (103.8 kg)   SpO2 98%   BMI 33.79 kg/m²     Physical Exam:    Physical Exam  Constitutional:       General: He is not in acute distress.      Appearance: Normal appearance. He is not ill-appearing. Neurological:      Mental Status: He is alert. Psychiatric:         Mood and Affect: Mood normal.         Behavior: Behavior normal.         Thought Content: Thought content normal.         Current Outpatient Medications   Medication Sig Dispense Refill    metFORMIN (GLUCOPHAGE) 500 MG tablet Take 1 tablet by mouth daily (with breakfast) 90 tablet 3    ELIQUIS 5 MG TABS tablet TAKE 1 TABLET BY MOUTH TWO TIMES A DAY  60 tablet 5    atorvastatin (LIPITOR) 40 MG tablet TAKE 1 TABLET BY MOUTH ONE TIME A DAY  90 tablet 1    lisinopril (PRINIVIL;ZESTRIL) 20 MG tablet TAKE 1/2 TABLET BY MOUTH ONE TIME A DAY 90 tablet 1    atenolol-chlorthalidone (TENORETIC) 100-25 MG per tablet Take 1 tablet by mouth daily (Patient taking differently: Take 0.5 tablets by mouth daily ) 90 tablet 3    Multiple Vitamins-Minerals (THERAPEUTIC MULTIVITAMIN-MINERALS) tablet Take 1 tablet by mouth daily       No current facility-administered medications for this visit. Assessment:    1. Pre-diabetes    2. Essential hypertension    3. Mixed hyperlipidemia        Plan:     1. Pre-diabetes  Stable. Continue current medications. - POCT glycosylated hemoglobin (Hb A1C) 5.3  - metFORMIN (GLUCOPHAGE) 500 MG tablet; Take 1 tablet by mouth 2 times daily (with meals)  Dispense: 180 tablet; Refill: 3  - CBC Auto Differential  - Comprehensive Metabolic Panel    2. Essential hypertension  Considering increasing Lisinopril if BP's remain high. - atenolol-chlorthalidone (TENORETIC) 100-25 MG per tablet; Take 1 tablet by mouth daily  Dispense: 90 tablet; Refill: 3  - lisinopril (PRINIVIL;ZESTRIL) 20 MG tablet; Take 1 tablet by mouth daily  Dispense: 90 tablet; Refill: 3  - CBC Auto Differential  - Comprehensive Metabolic Panel    3. Mixed hyperlipidemia  Stable. Continue current medications. - atorvastatin (LIPITOR) 40 MG tablet; Take 1 tablet by mouth daily  Dispense: 90 tablet;  Refill: 3  - CBC Auto Differential  - Comprehensive Metabolic Panel  - Lipid Panel      Return in about 6 months (around 3/2/2022) for Diabetes, Hypertension, Hyperlipidemia + AWV.

## 2021-09-07 ENCOUNTER — HOSPITAL ENCOUNTER (OUTPATIENT)
Dept: CARDIOLOGY | Age: 81
Discharge: HOME | End: 2021-09-07
Payer: MEDICARE

## 2021-09-07 ENCOUNTER — OFFICE VISIT (OUTPATIENT)
Dept: CARDIOLOGY CLINIC | Age: 81
End: 2021-09-07
Payer: MEDICARE

## 2021-09-07 VITALS
HEIGHT: 69 IN | SYSTOLIC BLOOD PRESSURE: 132 MMHG | OXYGEN SATURATION: 96 % | DIASTOLIC BLOOD PRESSURE: 72 MMHG | HEART RATE: 52 BPM | BODY MASS INDEX: 34.07 KG/M2 | WEIGHT: 230 LBS

## 2021-09-07 DIAGNOSIS — I35.0 NONRHEUMATIC AORTIC VALVE STENOSIS: ICD-10-CM

## 2021-09-07 DIAGNOSIS — I35.0 NONRHEUMATIC AORTIC VALVE STENOSIS: Primary | ICD-10-CM

## 2021-09-07 DIAGNOSIS — E78.00 HYPERCHOLESTEREMIA: ICD-10-CM

## 2021-09-07 DIAGNOSIS — I10 ESSENTIAL HYPERTENSION: ICD-10-CM

## 2021-09-07 LAB
LV EF: 60 %
LVEF MODALITY: NORMAL

## 2021-09-07 PROCEDURE — 99214 OFFICE O/P EST MOD 30 MIN: CPT | Performed by: INTERNAL MEDICINE

## 2021-09-07 PROCEDURE — 93306 TTE W/DOPPLER COMPLETE: CPT

## 2021-09-28 ENCOUNTER — TELEPHONE (OUTPATIENT)
Dept: PHARMACY | Facility: CLINIC | Age: 81
End: 2021-09-28

## 2021-09-28 NOTE — TELEPHONE ENCOUNTER
[facility] HEALTH CLINICAL PHARMACY REVIEW: ADHERENCE REVIEW  Identified care gap per [employer]; fills at [facility]: ACE/ARB, Diabetes and Statin adherence    Last Visit: 9/2/21    Patient found in Outcomes MTM and is currently eligible for TIP    ASSESSMENT  ACE/ARB ADHERENCE    Per Insurance Records through 9/5/21 (2020 [place] = 100%; YTD [place] = 83%; Potential Fail Date: 10/5/21):   Lisinopril last filled on 5/5/21 for 90 day supply. Next refill due: 8/3/21    Per chart review: lisinopril dose reduced from 20 mg daily to 10 mg daily at cardiology OV on 3/23/21 and was instructed to take 1/2 tablet until out. Per Reconciled Dispense Report:   Lisinopril filled on 5/5/21. QTY: 90, SIG: Take 1 tab daily. Patient filled a 6-month supply at that time (since dose was reduced). BP Readings from Last 3 Encounters:   09/07/21 132/72   09/02/21 (!) 152/72   05/03/21 (!) 142/74     CrCl cannot be calculated (Patient's most recent lab result is older than the maximum 120 days allowed. ). DIABETES ADHERENCE    Per Insurance Records through 9/5/21 ([place] = 44%; YTD [place] = 99%; Passed in 2021):   Metformin last filled on 8/3/21 for 90 day supply. Next refill due: 11/1/21    Lab Results   Component Value Date    LABA1C 5.3 09/02/2021    LABA1C 5.7 02/23/2021    LABA1C 5.4 11/19/2019     NOTE A1c <9%    STATIN ADHERENCE    Per Insurance Records through 9/5/21 (YTD [facility] = 88%; Passed in 2021): Atorvastatin last filled on 8/23/21 for 90 day supply. Next refill due: 11/25/21    Lab Results   Component Value Date    CHOL 136 11/19/2019    TRIG 148 11/19/2019    HDL 39 (L) 03/11/2021    LDLCALC 65 03/11/2021     ALT   Date Value Ref Range Status   03/11/2021 21 10 - 40 U/L Final     AST   Date Value Ref Range Status   03/11/2021 24 15 - 37 U/L Final     The ASCVD Risk score (Estela Platts., et al., 2013) failed to calculate for the following reasons:     The valid total cholesterol range is 130 to 320 mg/dL     PLAN  The following are interventions that have been identified:   - Patient will fail lisinopril adherence measure in . Patient's refill on 21 was filled with his old instructions (Take 1 tablet daily) instead of the new instructions (Take 1/2 tablet daily). New prescription sent on 3/23/21 has now  (not filled within 6 months of date written). Will kate for f/u on 21 to obtain a new prescription so that his next refill of lisinopril will be processed with the correct dose/instructions.      Future Appointments   Date Time Provider Jeb Pedro   2021 10:15 AM Chica Paz MD [facility]   3/7/2022  9:15 AM DO TEE Juarez, PharmD, [place] // Department, toll free [phone], option 1       For Hubert Rdz in place:  No   Gap Closed?: No    Time Spent (min): 10

## 2021-09-30 ENCOUNTER — OFFICE VISIT (OUTPATIENT)
Dept: ORTHOPEDIC SURGERY | Age: 81
End: 2021-09-30
Payer: MEDICARE

## 2021-09-30 VITALS — BODY MASS INDEX: 34.07 KG/M2 | HEIGHT: 69 IN | WEIGHT: 230 LBS

## 2021-09-30 DIAGNOSIS — M16.12 PRIMARY OSTEOARTHRITIS OF LEFT HIP: Primary | ICD-10-CM

## 2021-09-30 DIAGNOSIS — Z96.652 HISTORY OF TOTAL KNEE ARTHROPLASTY, LEFT: ICD-10-CM

## 2021-09-30 DIAGNOSIS — I35.0 AORTIC VALVE STENOSIS, ETIOLOGY OF CARDIAC VALVE DISEASE UNSPECIFIED: ICD-10-CM

## 2021-09-30 DIAGNOSIS — M25.552 LEFT HIP PAIN: ICD-10-CM

## 2021-09-30 PROCEDURE — 99215 OFFICE O/P EST HI 40 MIN: CPT | Performed by: ORTHOPAEDIC SURGERY

## 2021-09-30 NOTE — LETTER
[facility]  Surgery Scheduling Form:    21     DEMOGRAPHICS    Patient Name:  Arthur Zendejas  Patient :  1940   Patient SS#:  [id number]    Patient Phone:  [phone] (home)  Alt. Patient Phone:    Patient Address:  [address] [facility] [address]    PCP:  [address] [place] [address],   Insurance:  Payor: [facility] [address] / Plan: [place] PPO / Product Type: Medicare /   Insurance ID Number:  Payor/Plan Subscr  Sex Relation Sub. Ins. ID Effective Group Num   1.  [address] [facility] 1940 Male Self [id number]                                    [id number]       DIAGNOSIS & PROCEDURE    Diagnosis: LEFT  M16.12 Left hip primary osteoarthritis    Operation: LEFT  33067 Total Hip Arthroplasty Minimally-Invasive Direct Anterior     Provider:  Pinky Schultz MD    Location:  [facility] INFORMATION    Requested Date:  2021   Requested Time:  11:30 am      Patient Arrival Time:  9:30 am   OR Time Required:  90 Minutes  Admission:  []Outpatient   []23 hour  [x]Same Day Admit:   1-2  days  [x]Inpatient    Anesthesia:  [x]General  []Spinal  []MAC/Sedation  Regional Anesthesia:  []None  []OrthoMix  []Exparel []Lumbar Plexus Block   [x]Fascia Iliaca  []Femoral  []Adductor canal  []Interscalene Block  []Insert Catheter        EQUIPMENT    Position:  [x]Supine  []Lateral  []Beach-chair  []Prone    OR Bed:  []Regular  [x]Wainwright  []Ray  []Hip khan  []Beach-chair  []Spyder  Radiology:  [x]Large C-arm  []Small C-arm  []Portable X-ray    Implants:  [employer] Hip:  [x]Primary Set  []Revision Set  Medacta Hip:  []Dual-modular acetabulum (DM)    SUTURE: []#5 Ethibond  [x]#2 Ethibond  [x]#2 Quill  []#1 PDS  [x]#1 Vicryl                   [x]2-0 Vicryl  [x]3-0 Monocryl  []2-0 Nylon  []3-0 Nylon  []3-0 PDS                    []Dermabond  []Steri-strips (in half)  DRESSING:  [x]Prineo dermabond  []4x4 gauze  [x]ABDs  [x]Tegaderm  BRACE: [x]Pelvic Binder  []Hip X-ACT  []Knee TROM  []Knee immobilizer                 []Shoulder Immob. (w/abd. pillow)  []Sling  []Ice Unit  []Ace-Wrap      [x][employer]:  [employer] [phone], Joan [email or web address]  []Medacta: Ayden Vetnura [phone], [email or web address]  []Fx Shoulder: Anton Flores [phone], [email or web address]  []Mima: Loretta Mc [phone], [email or web address]    Comments:       Kasia Vilchis MD  [employer]  9/30/2021       11:39 AM EDT

## 2021-09-30 NOTE — PROGRESS NOTES
Dr Chica Paz      Date /Time 9/30/2021       5:18 PM EDT  Name Tracey Edwards             1940   Location  [facility]  MRN [id number]                Chief Complaint   Patient presents with    Knee Pain     CK LEFT KNEE    Left hip pain groin pain    History of Present Illness    Tracey Edwards is a 79 y.o. male who presents with  left knee pain. Also having mild groin pain    Patient presents the office today for follow-up visit. Patient being treated for left hip osteoarthritis. He did originally have knee pain also after total knee arthroplasty but the hip injection relieved the majority if not all of his knee pain. His hip pain has started to return. He is having significant discomfort with activities. He has a history of aortic stenosis, follows with a cardiologist.  He also is on Eliquis 5 mg. He has a history of diabetes. Previous History: Really presents with left knee pain. He had a left knee replacement done by Dr. Kevin Solis in 2015. Surgery went largely uneventfully. 1 week ago he started tight shoes and felt acute pain. Since that point, he has significant trouble tying his shoes and getting in and out of a car. He has difficult time rotating his hip. His main symptom however still remains left knee pain.     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    Diabetes mellitus ([address]     ED (erectile dysfunction)     Erectile dysfunction 2/2/2016    Hyperlipidemia     Hypertension     Nocturia     Pre-diabetes     Prostate CA ([address]     seeds implanted approx 2004     Past Surgical History:   Procedure Laterality Date    CATARACT REMOVAL Bilateral 03/2017    COLONOSCOPY  9/24/14    multiple polyp    JOINT REPLACEMENT Left 9/1/2015    left total knee replacement    KNEE ARTHROSCOPY      left knee    KNEE SURGERY Left 1963    PROSTATE SURGERY      TONSILLECTOMY       Family History   Problem Relation Age of Onset    Cancer Father         leukemia     Social History     Tobacco Use    Smoking status: Never Smoker    Smokeless tobacco: Never Used   Substance Use Topics    Alcohol use: Yes     Comment: occ      Current Outpatient Medications on File Prior to Visit   Medication Sig Dispense Refill    apixaban (ELIQUIS) 5 MG TABS tablet Take 1 tablet by mouth 2 times daily 180 tablet 3    metFORMIN (GLUCOPHAGE) 500 MG tablet Take 1 tablet by mouth daily (with breakfast) 90 tablet 3    atorvastatin (LIPITOR) 40 MG tablet TAKE 1 TABLET BY MOUTH ONE TIME A DAY  90 tablet 1    lisinopril (PRINIVIL;ZESTRIL) 20 MG tablet TAKE 1/2 TABLET BY MOUTH ONE TIME A DAY 90 tablet 1    atenolol-chlorthalidone (TENORETIC) 100-25 MG per tablet Take 1 tablet by mouth daily (Patient taking differently: Take 0.5 tablets by mouth daily ) 90 tablet 3    Multiple Vitamins-Minerals (THERAPEUTIC MULTIVITAMIN-MINERALS) tablet Take 1 tablet by mouth daily       No current facility-administered medications on file prior to visit. ASCVD 10-YEAR RISK SCORE  The ASCVD Risk score (Freddie Bonilla et al., 2013) failed to calculate for the following reasons: The valid total cholesterol range is 130 to 320 mg/dL   . Review of Systems  10-point ROS is negative other than HPI. Physical Exam  Based off 1997 Exam Criteria  Ht 5' 9\" (1.753 m)   Wt 230 lb (104.3 kg)   BMI 33.97 kg/m²      Constitutional:       General: He is not in acute distress. Appearance: Normal appearance. obese  Cardiovascular:      Rate and Rhythm: Normal rate and regular rhythm. Pulses: Normal pulses. Pulmonary:      Effort: Pulmonary effort is normal. No respiratory distress. Neurological:      Mental Status: He is alert and oriented to person, place, and time. Mental status is at baseline.      Musculoskeletal:  Gait:  antalgic    Spine / Hip Exam:      RIGHT  LEFT    Lumbar Spine Exam  [x] All Neg    [x] All Neg     Straight leg raise  []  []Not tested   []  []Not tested    Clonus  []  []Not tested   []  []Not tested    Pain with motion  []  []Not tested   []  []Not tested    Radiculopathy  []  []Not tested   []  []Not tested    Paraspinal muscle tenderness  [] Paraspinal  []Midline   [] Paraspinal  []Midline   Sensation RIGHT  LEFT    L3  [x] Normal []Decreased    [x] Normal []Decreased   L4  [x] Normal  []Decreased   [x] Normal []Decreased   L5  [x] Normal []Decreased   [x] Normal []Decreased   S1  [x] Normal  []Decreased   [x] Normal []Decreased   Pelvis       Scoliosis  [x] Nml  [] Present     Leg-length discrepency  [x] Equal  [] Right longer   [] Left longer   Range of Motion Active Passive Active Passive   Hip Flexion 90  80    Abduction 40  40    External Rotation @ 90 flex 40  40    Internal Rotation @ 90 flex 0  -10           Hip Impingement / Dysplasia  [] All Neg  [] Not tested   [] All Neg  [] Not tested    Hip impingement test  [x]  []Not tested   [x]  []Not tested    C-sign  []  []Not tested   [x]  []Not tested    Anterior instability apprehension  []  []Not tested   []  []Not tested    Posterior instability apprehension  []  []Not tested   []  []Not tested    Uncontained Internal rotation  []  []Not tested  []  []Not tested          Abductors  [x] All Neg  [] Not tested   [x] All Neg  [] Not tested    Medius strength  []  []Not tested   []  []Not tested    Minimum strength  []  []Not tested   []  []Not tested    IT band tendonitis  []  []Not tested   []  []Not tested    Trochanteric tenderness  []  []Not tested  []  []Not tested   Sciatic neuropathic pain  []  []Not tested   []  []Not tested           Post-arthroplasty  [] All Neg  [] Not tested   [] All Neg  [] Not tested    Rectus tendonitis  []  []Not tested   []  []Not tested    Iliopsoas tendonitis       Start-up pain  []  []Not tested   []  []Not tested      Markedly positive Stinchfield sign and reduced range of motion with impingement signs.     Imaging    Left Hip: [address] [facility]  Previous Radiographs: Grade 3 osteoarthritis, joint space narrowing, large cam lesion with osteophytes, cysts. Previous Left knee: Mima implants in position, no evidence of fractures or dislocations. No effusion on x-ray. Assessment and Plan  Tommie Grant was seen today for knee pain. Diagnoses and all orders for this visit:    Primary osteoarthritis of left hip    History of total knee arthroplasty, left    Left hip pain    Aortic valve stenosis, etiology of cardiac valve disease unspecified        Patient does have significant left hip osteoarthritis. He is scheduled for left total hip arthroplasty at [facility] on December 13, 2021. He will need preoperative medical clearance and cardiac clearance prior to surgery. He does pose increased risk of postoperative and intraoperative cardiac events due to aortic stenosis. In addition patient is a diabetic. She will need his A1c less than 7.5 before proceeding with surgery. Being a diabetic poses increased risk of postoperative wound dehiscence and infection. I discussed with Griffith Seip that his history, symptoms, signs and imaging are most consistent with hip arthritis    We reviewed the natural history of these conditions and treatment options ranging from conservative measures (rest, icing, activity modification, physical therapy, pain meds, cortisone injection)  to surgical options. We had a long discussion with the patient about their hip. We discussed surgical and non surgical options for hip arthritis. The most important thing is to work to maintain their range of motion. Next they can try medications including tylenol and NSAIDs. They can try glucosamine or chondroitin. They should also ice frequently and avoid activities that make their hip hurt. Cortisone injections and Synvisc injections are also options when medicine has failed.  We finally discussed surgical options including arthroscopic debridement versus hip replacement. Often the arthritis is too far gone for an arthroscopic debridement and pain relief will be short term. Their ultimate solution will be a hip replacement when they are ready for it. They should put it off until they can no longer stand the pain and when nothing else has worked. Conservative measures have failed. He is not interested in cortisone injections. I think he is an appropriate candidate for surgery due to his ongoing symptoms and dysfunction despite conservative measures. The procedure would be Left   [id number] Total Hip Arthroplasty, direct anterior    Perioperative considerations include: Patient will need cardiac clearance and medical clearance from his primary care physician. He will also need clearance to come off of his Eliquis from his cardiologist. and Chronic anticoagulation other than Aspirin. Patient remains on Eliquis    We reviewed the risks, benefits, alternatives of this approach. We discussed risks including, but not limited to, bleeding, pain, infection, scarring, damage to the neurovascular structures, blood clots, pulmonary embolus, stiffness, implant instability or loosening, implant failure, incomplete relief of pain, and incomplete return of function. We also reviewed the surgical details, expected recovery, and rehabilitation (6-9 months). He expressed understanding and will undergo preoperative medical evaluation and optimization. Electronically signed by Juan Sy PA-C on 9/30/2021 at 11:33 AM  This dictation was generated by voice recognition computer software. Although all attempts are made to edit the dictation for accuracy, there may be errors in the transcription that are not intended.

## 2021-09-30 NOTE — PROGRESS NOTES
Dorise Me    Age 79 y.o.    male    1940    MRN [id number]    12/13/2021  Arrival Time_____________  OR Time____________122 Min     Procedure(s):  LEFT TOTAL HIP ARTHROPLASTY MINIMALLY INVASIVE DIRECT ANTERIOR WITH FASCIAILIACA BLOCK FOR PAIN CONTROL                        [facility]                      [facility]     Surgeon(s): Al Roth, MD      DAY ADMIT ___  SDS/OP ___  OUTPT IN BED ___         Phone [phone] (home)    PCP _____________________ Phone_________________ Epic ( ) Epic CE ( ) Appt ________    ADDITIONAL INFO __________________________________ Cardio/Consult _____________    NOTES _____________________________________________________________________    ____________________________________________________________________________    PAT APPT DATE:________ TIME: ________  FAXED QAD: _______  (__) H&P w/ hospitalist  ____________________________________________________________________________    COVID TEST: Date/Location______________        NURSING HISTORY COMPLETE: _______  (__) CBC       (__) W/ DIFF ___________  (__)  ECHO    __________  (__) Hgb A1C    ___________  (__) CHEST X RAY   __________  (__) LIPID PROFILE  ___________  (__) EKG   __________  (__) PT/PTT   ___________  (__) PFT's   __________  (__) BMP   ___________  (__) CAROTIDS  __________  (__) CMP   ___________  (__) VEIN MAPPING  __________  (__) U/A   ___________  (__) HISTORY & PHYSICAL __________  (__) URINE C & S  ___________  (__) CARDIAC CLEARANCE __________  (__) U/A W/ FLEX  ___________  (__) PULM.  CLEARANCE __________  (__) SERUM PREGNANCY ___________  (__) Check Epic DOS orders __________  (__) TYPE & SCREEN ________ repeat ( ) (__)  __________________ __________  (__) ALBUMIN   ___________  (__)  __________________ __________  (__) TRANSFERRIN  ___________  (__)  __________________ __________  (__) LIVER PROFILE  ___________  (__)  __________________ __________  (__) CARBOXY HGB  ___________  (__) URINE PREG DOS __________  (__) NICOTINE & MET.  ___________  (__) BLOOD SUGAR DOS __________  (__) PREALBUMIN  ___________    (__) MRSA NASAL SWAB ___________  (__) BLOOD THINNERS __________  (__) ACE/ ARBS: _____________________    (__) BETABLOCKERS ___________________

## 2021-10-01 ENCOUNTER — TELEPHONE (OUTPATIENT)
Dept: CARDIOLOGY CLINIC | Age: 81
End: 2021-10-01

## 2021-10-01 NOTE — TELEPHONE ENCOUNTER
CARDIAC CLEARANCE REQUEST    What type of procedure are you having: left hip surgery    Are you taking any blood thinners: Eliquis    When is your procedure scheduled for: 12/13/2021    What physician is performing your procedure: Dr Milan Wood    Fax number to send the letter:

## 2021-10-04 ENCOUNTER — HOSPITAL ENCOUNTER (OUTPATIENT)
Age: 81
Discharge: HOME | End: 2021-10-04
Payer: MEDICARE

## 2021-10-04 ENCOUNTER — TELEPHONE (OUTPATIENT)
Dept: CARDIOLOGY CLINIC | Age: 81
End: 2021-10-04

## 2021-10-04 LAB
ABO/RH: NORMAL
ANTIBODY SCREEN: NORMAL
APTT: 55.5 SEC (ref 26.2–38.6)
BASOPHILS ABSOLUTE: 0.1 K/UL (ref 0–0.2)
BASOPHILS RELATIVE PERCENT: 0.7 %
BILIRUBIN URINE: NEGATIVE
BLOOD, URINE: NEGATIVE
CLARITY: CLEAR
COLOR: YELLOW
EOSINOPHILS ABSOLUTE: 0.2 K/UL (ref 0–0.6)
EOSINOPHILS RELATIVE PERCENT: 3.2 %
GLUCOSE URINE: NEGATIVE MG/DL
HCT VFR BLD CALC: 38.6 % (ref 40.5–52.5)
HEMOGLOBIN: 12.6 G/DL (ref 13.5–17.5)
INR BLD: 1.6 (ref 0.88–1.12)
KETONES, URINE: NEGATIVE MG/DL
LEUKOCYTE ESTERASE, URINE: NEGATIVE
LYMPHOCYTES ABSOLUTE: 1.2 K/UL (ref 1–5.1)
LYMPHOCYTES RELATIVE PERCENT: 16.6 %
MCH RBC QN AUTO: 26.9 PG (ref 26–34)
MCHC RBC AUTO-ENTMCNC: 32.7 G/DL (ref 31–36)
MCV RBC AUTO: 82.5 FL (ref 80–100)
MICROSCOPIC EXAMINATION: NORMAL
MONOCYTES ABSOLUTE: 0.4 K/UL (ref 0–1.3)
MONOCYTES RELATIVE PERCENT: 5.4 %
NEUTROPHILS ABSOLUTE: 5.4 K/UL (ref 1.7–7.7)
NEUTROPHILS RELATIVE PERCENT: 74.1 %
NITRITE, URINE: NEGATIVE
PDW BLD-RTO: 15.8 % (ref 12.4–15.4)
PH UA: 6.5 (ref 5–8)
PLATELET # BLD: 225 K/UL (ref 135–450)
PMV BLD AUTO: 8.6 FL (ref 5–10.5)
PROTEIN UA: NEGATIVE MG/DL
PROTHROMBIN TIME: 18.4 SEC (ref 9.9–12.7)
RBC # BLD: 4.68 M/UL (ref 4.2–5.9)
SPECIFIC GRAVITY UA: 1.01 (ref 1–1.03)
URINE TYPE: NORMAL
UROBILINOGEN, URINE: 0.2 E.U./DL
WBC # BLD: 7.3 K/UL (ref 4–11)

## 2021-10-04 PROCEDURE — 86901 BLOOD TYPING SEROLOGIC RH(D): CPT

## 2021-10-04 PROCEDURE — 80048 BASIC METABOLIC PNL TOTAL CA: CPT

## 2021-10-04 PROCEDURE — 86850 RBC ANTIBODY SCREEN: CPT

## 2021-10-04 PROCEDURE — 83036 HEMOGLOBIN GLYCOSYLATED A1C: CPT

## 2021-10-04 PROCEDURE — 87641 MR-STAPH DNA AMP PROBE: CPT

## 2021-10-04 PROCEDURE — 81003 URINALYSIS AUTO W/O SCOPE: CPT

## 2021-10-04 PROCEDURE — 86900 BLOOD TYPING SEROLOGIC ABO: CPT

## 2021-10-04 PROCEDURE — 82040 ASSAY OF SERUM ALBUMIN: CPT

## 2021-10-04 PROCEDURE — 84466 ASSAY OF TRANSFERRIN: CPT

## 2021-10-04 PROCEDURE — 36415 COLL VENOUS BLD VENIPUNCTURE: CPT

## 2021-10-04 PROCEDURE — 85730 THROMBOPLASTIN TIME PARTIAL: CPT

## 2021-10-04 PROCEDURE — 93005 ELECTROCARDIOGRAM TRACING: CPT

## 2021-10-04 PROCEDURE — 85025 COMPLETE CBC W/AUTO DIFF WBC: CPT

## 2021-10-04 PROCEDURE — 87086 URINE CULTURE/COLONY COUNT: CPT

## 2021-10-04 PROCEDURE — 85610 PROTHROMBIN TIME: CPT

## 2021-10-04 NOTE — TELEPHONE ENCOUNTER
CARDIAC CLEARANCE     What type of procedure are you having? Left knee replacement    Which physician is performing your procedure? Dr Eva Renteria    When is your procedure scheduled for? 12/13/21    Where are you having this [facility]     Are you taking Blood Thinners? eliquis   If so what? (Name/dose/frequesncy)     Does the surgeon want you to stop your blood thinner? If so for how long? Did not say     Phone Number and Contact Name for Physicians office:    Fax number to send information:  [phone]   [facility] 9/7/21     CALL PATIENT TO LET HIM KNOW ABOUT HIS ELIQUIS AND IF HE NEEDS AN APPT .

## 2021-10-05 LAB
ALBUMIN SERPL-MCNC: 4.2 G/DL (ref 3.4–5)
ANION GAP SERPL CALCULATED.3IONS-SCNC: 10 MMOL/L (ref 3–16)
BUN BLDV-MCNC: 11 MG/DL (ref 7–20)
CALCIUM SERPL-MCNC: 9.5 MG/DL (ref 8.3–10.6)
CHLORIDE BLD-SCNC: 98 MMOL/L (ref 99–110)
CO2: 27 MMOL/L (ref 21–32)
CREAT SERPL-MCNC: 0.5 MG/DL (ref 0.8–1.3)
ESTIMATED AVERAGE GLUCOSE: 93.9 MG/DL
GFR AFRICAN AMERICAN: >60
GFR NON-AFRICAN AMERICAN: >60
GLUCOSE BLD-MCNC: 119 MG/DL (ref 70–99)
HBA1C MFR BLD: 4.9 %
MRSA SCREEN RT-PCR: NORMAL
POTASSIUM SERPL-SCNC: 3.9 MMOL/L (ref 3.5–5.1)
SODIUM BLD-SCNC: 135 MMOL/L (ref 136–145)
TRANSFERRIN: 258 MG/DL (ref 200–360)
URINE CULTURE, ROUTINE: NORMAL

## 2021-10-05 NOTE — TELEPHONE ENCOUNTER
Please send this to Dr Ekta Correia box he knows patient better,  no urgency  re surgical clearance. He can answer it next week.

## 2021-10-06 LAB
EKG ATRIAL RATE: 49 BPM
EKG DIAGNOSIS: NORMAL
EKG Q-T INTERVAL: 442 MS
EKG QRS DURATION: 114 MS
EKG QTC CALCULATION (BAZETT): 399 MS
EKG R AXIS: -29 DEGREES
EKG T AXIS: 70 DEGREES
EKG VENTRICULAR RATE: 49 BPM

## 2021-10-07 NOTE — TELEPHONE ENCOUNTER
He should make appt to see me given aortic stenosis and afib. Last OV was February. Want to see how he is doing clinically before giving preop risk stratification. Thanks.

## 2021-10-11 NOTE — TELEPHONE ENCOUNTER
Left VM to call office back to schedule appt per [facility] instructions.

## 2021-10-14 ENCOUNTER — OFFICE VISIT (OUTPATIENT)
Dept: CARDIOLOGY CLINIC | Age: 81
End: 2021-10-14
Payer: MEDICARE

## 2021-10-14 VITALS
DIASTOLIC BLOOD PRESSURE: 50 MMHG | HEIGHT: 69 IN | SYSTOLIC BLOOD PRESSURE: 118 MMHG | BODY MASS INDEX: 34.44 KG/M2 | OXYGEN SATURATION: 98 % | HEART RATE: 54 BPM | WEIGHT: 232.5 LBS

## 2021-10-14 DIAGNOSIS — I48.91 ATRIAL FIBRILLATION, UNSPECIFIED TYPE (HCC): ICD-10-CM

## 2021-10-14 DIAGNOSIS — E78.2 MIXED HYPERLIPIDEMIA: ICD-10-CM

## 2021-10-14 DIAGNOSIS — I35.0 NONRHEUMATIC AORTIC VALVE STENOSIS: ICD-10-CM

## 2021-10-14 DIAGNOSIS — R94.31 ABNORMAL EKG: Primary | ICD-10-CM

## 2021-10-14 DIAGNOSIS — I10 ESSENTIAL HYPERTENSION: ICD-10-CM

## 2021-10-14 PROCEDURE — 93000 ELECTROCARDIOGRAM COMPLETE: CPT | Performed by: INTERNAL MEDICINE

## 2021-10-14 PROCEDURE — 99214 OFFICE O/P EST MOD 30 MIN: CPT | Performed by: INTERNAL MEDICINE

## 2021-10-14 NOTE — PROGRESS NOTES
[address]   Cardiac Consultation    Referring Provider:  Rafy Cohen DO     No chief complaint on file. Subjective: Eloisa Vang presents for cardiac risk assessment for left total hip replacement 12/2021 with Dr Ankur Harrell at [facility]; no complaints today    History of Present Illness:    Eloisa Vang is a [de-identified] y.o. male who here for preop eval and routine f/u. I originally saw him 1/15/20 to establish care and abnormal EKG possible afib. He has PMH of chronic afib dx 2/21 on eliquis, arthritis, gout, DM, HTN, HLD, moderate AS, and prostate cancer s/p radiation seeds. Note ECHO on 1/10/2020 showed EF is 55-60%, mild to mod AS (velocity=2.99m/s; mean pressure=19mmHg). Most recent lexiscan myoview in 8/2015 showed normal myocardial perfusion. Originally from [facility] and is a retired . He wore a cardiac event monitor 2/2021 that showed 100% AF and 4% PVC burden. Most recent ECG 10/4/2021 afib, IVCD, anteroseptal infarct, 49 BPM.    Note ECHO 3/21 showed moderate AS (velocity=3.83m/s; MPG=36mmHg). Due to worsened AS with quick progression from 1/20 study I referred to Dr. Davies [facility] TAVR clinic. He felt AS moderate and did not require TAVR. Most recent  9/7/2021 showed LVEF of 60%, AV max velocity of 3.49 m/sec and MPG of 29 mmHg. This is c/w moderate aortic stenosis. Mild aortic regurgitation. Mild to moderate MR. Mild TR (SPAP) is normal and estimated 36mmHg. Today, patient reports he feels good except for left hip pain. He reports that he does get dizzy with position changes at times. He reports he is taking all medications as prescribed and tolerates them well. Denies recent issues with bleeding or bruising. Patient denies current chest pain, sob, palpitations, or syncope. Walks dog for 1 mile daily without CP or SOB or sig prsyncope.     Past Medical History:   has a past medical history of Arthritis, Chronic gout without tophus, Diabetes mellitus ([address] ED (erectile dysfunction), Erectile dysfunction, Hyperlipidemia, Hypertension, Nocturia, Pre-diabetes, and Prostate CA ([address] Surgical History:   has a past surgical history that includes Prostate surgery; Tonsillectomy; Knee arthroscopy; knee surgery (Left, 1963); Colonoscopy (9/24/14); joint replacement (Left, 9/1/2015); and Cataract removal (Bilateral, 03/2017). Social History:   he is  with one son. He is a retired . He drinks alcohol- 2-3 drinks of high balls a day. no drug use. Never smokes. reports that he has never smoked. He has never used smokeless tobacco. He reports current alcohol use. He reports that he does not use drugs. Family History:  family history includes Cancer in his father. No significant heart disease history in parents    Home Medications:  Prior to Admission medications    Medication Sig Start Date End Date Taking? Authorizing Provider   mupirocin (BACTROBAN) 2 % ointment Apply twice daily for 5 days to each nare, begin 12/08/2021 12/8/21 12/13/21  Robbin Bates MD   apixaban (ELIQUIS) 5 MG TABS tablet Take 1 tablet by mouth 2 times daily 9/7/21   Manda Sierra MD   metFORMIN (GLUCOPHAGE) 500 MG tablet Take 1 tablet by mouth daily (with breakfast) 9/2/21   Camilla Rios DO   atorvastatin (LIPITOR) 40 MG tablet TAKE 1 TABLET BY MOUTH ONE TIME A DAY  8/23/21   Camilla Rios DO   lisinopril (PRINIVIL;ZESTRIL) 20 MG tablet TAKE 1/2 TABLET BY MOUTH ONE TIME A DAY 3/23/21   Manda Sierra MD   atenolol-chlorthalidone (TENORETIC) 100-25 MG per tablet Take 1 tablet by mouth daily  Patient taking differently: Take 0.5 tablets by mouth daily  8/17/20   Camilla Rios DO   Multiple Vitamins-Minerals (THERAPEUTIC MULTIVITAMIN-MINERALS) tablet Take 1 tablet by mouth daily    Historical Provider, MD        Allergies:  Pcn [penicillins]     Review of Systems:   · Constitutional: there has been no unanticipated weight loss.  There's been no change in energy level, sleep pattern, or activity level. · Eyes: No visual changes or diplopia. No scleral icterus. · ENT: No Headaches, hearing loss or vertigo. No mouth sores or sore throat. · Cardiovascular: Reviewed in HPI  · Respiratory: No cough or wheezing, no sputum production. No hematemesis. · Gastrointestinal: No abdominal pain, appetite loss, blood in stools. No change in bowel or bladder habits. · Genitourinary: No dysuria, trouble voiding, or hematuria. · Musculoskeletal:  No gait disturbance, weakness or joint complaints. · Integumentary: No rash or pruritis. · Neurological: No headache, diplopia, change in muscle strength, numbness or tingling. No change in gait, balance, coordination, mood, affect, memory, mentation, behavior. · Psychiatric: No anxiety, no depression. · Endocrine: No malaise, fatigue or temperature intolerance. No excessive thirst, fluid intake, or urination. No tremor. · Hematologic/Lymphatic: No abnormal bruising or bleeding, blood clots or swollen lymph nodes. · Allergic/Immunologic: No nasal congestion or hives. Physical Examination:    Vitals:    10/14/21 1300   BP: (!) 118/50   Pulse: 54   SpO2: 98%        Constitutional and General Appearance: NAD   Respiratory:  · Normal excursion and expansion without use of accessory muscles  · Resp Auscultation: Normal breath sounds without dullness  Cardiovascular:  · The apical impulses not displaced  · Heart tones are crisp and normal  · Cervical veins are not engorged  · The carotid upstroke is normal in amplitude and contour without delay or bruit  · Irregular rhythm, II/VI harsh MISTI  · Peripheral pulses are symmetrical and full  · There is no clubbing, cyanosis of the extremities.   · 1 + BLE  edema  · Femoral Arteries: 2+ and equal  · Pedal Pulses: 2+ and equal   Abdomen:  · No masses or tenderness  · Liver/Spleen: No Abnormalities Noted  Neurological/Psychiatric:  · Alert and oriented in all spheres  · Moves all extremities well  · Exhibits normal gait balance and coordination  · No abnormalities of mood, affect, memory, mentation, or behavior are noted  Skin:  · Skin: warm and dry. Lab Results   Component Value Date    CHOL 136 11/19/2019    CHOL 136 12/06/2017    CHOL 133 12/06/2016     Lab Results   Component Value Date    TRIG 148 11/19/2019    TRIG 118 12/06/2017    TRIG 135 12/06/2016     Lab Results   Component Value Date    HDL 39 (L) 03/11/2021    HDL 54 11/19/2019    HDL 41 12/06/2017     Lab Results   Component Value Date    LDLCALC 65 03/11/2021    LDLCALC 52 11/19/2019    LDLCALC 71 12/06/2017     Lab Results   Component Value Date    LABVLDL 15 03/11/2021    LABVLDL 30 11/19/2019    LABVLDL 24 12/06/2017     No results found for: CHOLHDLRATIO    [id number] Score for Atrial Fibrillation Stroke Risk   Risk   Factors  Component Value   C CHF No 0   H HTN Yes 1   A2 Age >= 76 Yes,  [de-identified] y.o.) 2   D DM No 0   S2 Prior Stroke/TIA No 0   V Vascular Disease No 0   A Age 74-69 No,  [de-identified] y.o.) 0   Sc Sex male 0    [id number]  Score  3   Score last updated 10/14/21 1:38 PM EDT    Assessment:     1. Atrial fibrillation: Persistent. He wore a cardiac event monitor 2/2021 that showed 100% AF and 4% PVC burden. 2. Aortic stenosis: Most recent  9/7/2021 showed LVEF of 60%, AV max velocity of 3.49 m/sec and MPG of 29 mmHg. He was seen in TAVR clinic by Dr. Yolanda Dunaway who feels moderate AS and no need for TAVR at this time. Follow serial ECHO studies. 3. Hypertension: Well controlled and will continue current medical regimen. 4. Hyperlipidemia: per PCP. I personally reviewed most recent lipids from 3/11/21 in Epic (see above). Well controlled and will continue current medical regimen of lipitor 40mg daily. 5. Diabetes mellitus: per PCP     6. SOB: Resolved. No complaints today    Plan:  1. Limited echo in 6 months to reassess AS. If stable then can spread out ECHO studies unless develops symptoms. 2. OK to proceed with hip replacement.  Letter

## 2021-10-14 NOTE — PATIENT INSTRUCTIONS
Plan:  1. Limited echo in 6 months. 2. OK to proceed with hip replacement. Letter generated. 3. Follow up with [facility] after echo in 6 months.

## 2021-10-14 NOTE — LETTER
October 14, 2021  Nano Park  1940          [address]   Cardiac Consultation    Referring Provider:  Daryle Buttner, DO     No chief complaint on file. Subjective: Nano Park presents for cardiac risk assessment for left total hip replacement 12/2021 with Dr Margy Potts at [facility]; no complaints today    History of Present Illness:    Nano Park is a [de-identified] y.o. male who here for preop eval and routine f/u. I originally saw him 1/15/20 to establish care and abnormal EKG possible afib. He has PMH of chronic afib dx 2/21 on eliquis, arthritis, gout, DM, HTN, HLD, moderate AS, and prostate cancer s/p radiation seeds. Note ECHO on 1/10/2020 showed EF is 55-60%, mild to mod AS (velocity=2.99m/s; mean pressure=19mmHg). Most recent lexiscan myoview in 8/2015 showed normal myocardial perfusion. Originally from [facility] and is a retired . He wore a cardiac event monitor 2/2021 that showed 100% AF and 4% PVC burden. Most recent ECG 10/4/2021 afib, IVCD, anteroseptal infarct, 49 BPM.    Note ECHO 3/21 showed moderate AS (velocity=3.83m/s; MPG=36mmHg). Due to worsened AS with quick progression from 1/20 study I referred to Dr. Jolynn Packer TAVR clinic. He felt AS moderate and did not require TAVR. Most recent  9/7/2021 showed LVEF of 60%, AV max velocity of 3.49 m/sec and MPG of 29 mmHg. This is c/w moderate aortic stenosis. Mild aortic regurgitation. Mild to moderate MR. Mild TR (SPAP) is normal and estimated 36mmHg. Today, patient reports he feels good except for left hip pain. He reports that he does get dizzy with position changes at times. He reports he is taking all medications as prescribed and tolerates them well. Denies recent issues with bleeding or bruising. Patient denies current chest pain, sob, palpitations, or syncope. Walks dog for 1 mile daily without CP or SOB or sig prsyncope.     Past Medical History:   has a past medical history of Arthritis, Chronic gout without tophus, Diabetes mellitus ([facility] [address] ED (erectile dysfunction), Erectile dysfunction, Hyperlipidemia, Hypertension, Nocturia, Pre-diabetes, and Prostate CA ([facility] [address] Surgical History:   has a past surgical history that includes Prostate surgery; Tonsillectomy; Knee arthroscopy; knee surgery (Left, 1963); Colonoscopy (9/24/14); joint replacement (Left, 9/1/2015); and Cataract removal (Bilateral, 03/2017). Social History:   he is  with one son. He is a retired . He drinks alcohol- 2-3 drinks of high balls a day. no drug use. Never smokes. reports that he has never smoked. He has never used smokeless tobacco. He reports current alcohol use. He reports that he does not use drugs. Family History:  family history includes Cancer in his father. No significant heart disease history in parents    Home Medications:  Prior to Admission medications    Medication Sig Start Date End Date Taking?  Authorizing Provider   mupirocin (BACTROBAN) 2 % ointment Apply twice daily for 5 days to each nare, begin 12/08/2021 12/8/21 12/13/21  Robbin Bates MD   apixaban (ELIQUIS) 5 MG TABS tablet Take 1 tablet by mouth 2 times daily 9/7/21   Manda Sierra MD   metFORMIN (GLUCOPHAGE) 500 MG tablet Take 1 tablet by mouth daily (with breakfast) 9/2/21   Camilla Rios,    atorvastatin (LIPITOR) 40 MG tablet TAKE 1 TABLET BY MOUTH ONE TIME A DAY  8/23/21   Camilla Rios, DO   lisinopril (PRINIVIL;ZESTRIL) 20 MG tablet TAKE 1/2 TABLET BY MOUTH ONE TIME A DAY 3/23/21   Manda Sierra MD   atenolol-chlorthalidone (TENORETIC) 100-25 MG per tablet Take 1 tablet by mouth daily  Patient taking differently: Take 0.5 tablets by mouth daily  8/17/20   Camilla Rios, DO   Multiple Vitamins-Minerals (THERAPEUTIC MULTIVITAMIN-MINERALS) tablet Take 1 tablet by mouth daily    Historical Provider, MD        Allergies:  Pcn [penicillins]     Review of Systems:   · Constitutional: there has been no unanticipated weight loss. There's been no change in energy level, sleep pattern, or activity level. · Eyes: No visual changes or diplopia. No scleral icterus. · ENT: No Headaches, hearing loss or vertigo. No mouth sores or sore throat. · Cardiovascular: Reviewed in HPI  · Respiratory: No cough or wheezing, no sputum production. No hematemesis. · Gastrointestinal: No abdominal pain, appetite loss, blood in stools. No change in bowel or bladder habits. · Genitourinary: No dysuria, trouble voiding, or hematuria. · Musculoskeletal:  No gait disturbance, weakness or joint complaints. · Integumentary: No rash or pruritis. · Neurological: No headache, diplopia, change in muscle strength, numbness or tingling. No change in gait, balance, coordination, mood, affect, memory, mentation, behavior. · Psychiatric: No anxiety, no depression. · Endocrine: No malaise, fatigue or temperature intolerance. No excessive thirst, fluid intake, or urination. No tremor. · Hematologic/Lymphatic: No abnormal bruising or bleeding, blood clots or swollen lymph nodes. · Allergic/Immunologic: No nasal congestion or hives. Physical Examination:    Vitals:    10/14/21 1300   BP: (!) 118/50   Pulse: 54   SpO2: 98%        Constitutional and General Appearance: NAD   Respiratory:  · Normal excursion and expansion without use of accessory muscles  · Resp Auscultation: Normal breath sounds without dullness  Cardiovascular:  · The apical impulses not displaced  · Heart tones are crisp and normal  · Cervical veins are not engorged  · The carotid upstroke is normal in amplitude and contour without delay or bruit  · Irregular rhythm, II/VI harsh MISTI  · Peripheral pulses are symmetrical and full  · There is no clubbing, cyanosis of the extremities.   · 1 + BLE  edema  · Femoral Arteries: 2+ and equal  · Pedal Pulses: 2+ and equal   Abdomen:  · No masses or tenderness  · Liver/Spleen: No Abnormalities Noted  Neurological/Psychiatric:  · Alert and oriented in all spheres  · Moves all extremities well  · Exhibits normal gait balance and coordination  · No abnormalities of mood, affect, memory, mentation, or behavior are noted  Skin:  · Skin: warm and dry. Lab Results   Component Value Date    CHOL 136 11/19/2019    CHOL 136 12/06/2017    CHOL 133 12/06/2016     Lab Results   Component Value Date    TRIG 148 11/19/2019    TRIG 118 12/06/2017    TRIG 135 12/06/2016     Lab Results   Component Value Date    HDL 39 (L) 03/11/2021    HDL 54 11/19/2019    HDL 41 12/06/2017     Lab Results   Component Value Date    LDLCALC 65 03/11/2021    LDLCALC 52 11/19/2019    LDLCALC 71 12/06/2017     Lab Results   Component Value Date    LABVLDL 15 03/11/2021    LABVLDL 30 11/19/2019    LABVLDL 24 12/06/2017     No results found for: CHOLHDLRATIO    [id number] Score for Atrial Fibrillation Stroke Risk   Risk   Factors  Component Value   C CHF No 0   H HTN Yes 1   A2 Age >= 76 Yes,  [de-identified] y.o.) 2   D DM No 0   S2 Prior Stroke/TIA No 0   V Vascular Disease No 0   A Age 74-69 No,  [de-identified] y.o.) 0   Sc Sex male 0    [id number]  Score  3   Score last updated 10/14/21 1:38 PM EDT    Assessment:     1. Atrial fibrillation: Persistent. He wore a cardiac event monitor 2/2021 that showed 100% AF and 4% PVC burden. 2. Aortic stenosis: Most recent  9/7/2021 showed LVEF of 60%, AV max velocity of 3.49 m/sec and MPG of 29 mmHg. He was seen in TAVR clinic by Dr. Alia Menendez who feels moderate AS and no need for TAVR at this time. Follow serial ECHO studies. 3. Hypertension: Well controlled and will continue current medical regimen. 4. Hyperlipidemia: per PCP. I personally reviewed most recent lipids from 3/11/21 in Epic (see above). Well controlled and will continue current medical regimen of lipitor 40mg daily. 5. Diabetes mellitus: per PCP     6. SOB: Resolved. No complaints today    Plan:  1. Limited echo in 6 months to reassess AS.  If stable then can spread out ECHO studies unless develops symptoms. 2. OK to proceed with hip replacement. Letter generated. 3. Follow up with [facility] after echo in 6 months. This note has been scribed in the presence of Huyen Salazar MD, by Fredy Acosta RN.     I, Dr. Huyen Salazar, personally performed the services described in this documentation, as scribed by the above signed scribe in my presence. It is both accurate and complete to my knowledge. I agree with the details independently gathered by the clinical support staff, while the remaining scribed note accurately describes my personal service to the patient. Cost of prescription medications and patient compliance have been reviewed with patient. All questions answered. Thank you for allowing me to participate in the care of this individual.    Betty Ignacio.  Cassandra Rosenbaum M.D., [facility]

## 2021-10-25 ENCOUNTER — HOSPITAL ENCOUNTER (OUTPATIENT)
Dept: PHYSICAL THERAPY | Age: 81
Setting detail: THERAPIES SERIES
Discharge: HOME | End: 2021-10-25
Payer: MEDICARE

## 2021-10-25 PROCEDURE — 97110 THERAPEUTIC EXERCISES: CPT | Performed by: PHYSICAL THERAPIST

## 2021-10-25 PROCEDURE — 97161 PT EVAL LOW COMPLEX 20 MIN: CPT | Performed by: PHYSICAL THERAPIST

## 2021-10-25 NOTE — FLOWSHEET NOTE
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  Physician Information:  Referring Practitioner: Dr. Seth Galeano of care signed (Y/N):     Date of Patient follow up with Physician:     G-Code (if applicable):      Date G-Code Applied:  10/25/21   LEFS   42/80 = 48%    Progress Note: [x]  Yes  []  No  Next due by: Visit #10       Latex Allergy:  [x]NO      []YES  Preferred Language for Healthcare:   [x]English       []other:       Visit # Insurance Allowable Auth Required   In-person 1  []  Yes []  No    Telehealth   []  Yes []  No    Total        Pain level:  4-8/10     SUBJECTIVE:  See eval    OBJECTIVE: See eval   Observation:    Test measurements:      RESTRICTIONS/PRECAUTIONS: Left TKR, HTN    Exercises/Interventions:     Therapeutic Ex Sets/reps Notes        Seated HS stretch 3x30 sec HEP   Seated calf stretch 3x30 sec HEP   Seated QS BLEs 10 sec 10x HEP   Supine heelslide 1x10 HEP   Isometric abdominal 10 sec 10x HEP   LAQ  1x10 HEP   minisquats 1x10 HEP             Access Code: [email or web address]  URL: [email or web address]  Date:   Prepared by: Nacho Romero    Exercises  Long Sitting Calf Stretch with Strap - 2 x daily - 7 x weekly - 5 reps - 30 seconds hold  Seated Table Hamstring Stretch - 2 x daily - 7 x weekly - 5 reps - 30 seconds hold  Supine Quadricep Sets - 2 x daily - 7 x weekly - 10 reps - 10 seconds hold  Hip Flexion - 2 x daily - 7 x weekly - 10 reps - 1 second hold  Seated Long Arc Quad - 2 x daily - 7 x weekly - 10 reps - 3 sets - 1 second hold  Mini Squat with Counter Support - 2 x daily - 7 x weekly - 10 reps - 3 sets      Therapeutic Exercise and NMR EXR  [x] (88638) Provided verbal/tactile cueing for activities related to strengthening, flexibility, endurance, ROM for improvements in LE, proximal hip, and core control with self care, mobility, lifting, ambulation.  [] (75136) Provided verbal/tactile cueing for activities related to improving balance, coordination, kinesthetic sense, posture, motor skill, proprioception  to assist with LE, proximal hip, and core control in self care, mobility, lifting, ambulation and eccentric single leg control.      NMR and Therapeutic Activities:    [] (66451 or 75877) Provided verbal/tactile cueing for activities related to improving balance, coordination, kinesthetic sense, posture, motor skill, proprioception and motor activation to allow for proper function of core, proximal hip and LE with self care and ADLs  [] (89327) Gait Re-education- Provided training and instruction to the patient for proper LE, core and proximal hip recruitment and positioning and eccentric body weight control with ambulation re-education including up and down stairs     Home Exercise Program:    [x] (14097) Reviewed/Progressed HEP activities related to strengthening, flexibility, endurance, ROM of core, proximal hip and LE for functional self-care, mobility, lifting and ambulation/stair navigation   [] (92093)Reviewed/Progressed HEP activities related to improving balance, coordination, kinesthetic sense, posture, motor skill, proprioception of core, proximal hip and LE for self care, mobility, lifting, and ambulation/stair navigation      Manual Treatments:  PROM / STM / Oscillations-Mobs:  G-I, II, III, IV (PA's, Inf., Post.)  [] (70879) Provided manual therapy to mobilize LE, proximal hip and/or LS spine soft tissue/joints for the purpose of modulating pain, promoting relaxation,  increasing ROM, reducing/eliminating soft tissue swelling/inflammation/restriction, improving soft tissue extensibility and allowing for proper ROM for normal function with self care, mobility, lifting and ambulation. Modalities:      Charges:  Timed Code Treatment Minutes: 20   Total Treatment Minutes: 45     [x] EVAL (LOW) 70379 (typically 20 minutes face-to-face)  [] EVAL (MOD) 34456 (typically 30 minutes face-to-face)  [] EVAL (HIGH) 43003 (typically 45 minutes face-to-face)  [] RE-EVAL     [x] IS(72944) x 1     [] IONTO  [] NMR (42011) x      [] VASO  [] Manual (78479) x       [] Other:  [] TA x       [] Mech Traction (05154)  [] ES(attended) (82989)      [] ES (un) (25015):     GOALS:   Patient stated goal: To be prepared for surgery      Therapist goals for Patient:    Short Term Goals: To be achieved in: 2 weeks  1. Independent in HEP and progression per patient tolerance, in order to prevent re-injury and to prepare for surgery by strength and flexibility therex . Progression Towards Functional goals:  [] Patient is progressing as expected towards functional goals listed. [] Progression is slowed due to complexities listed. [] Progression has been slowed due to co-morbidities. [x] Plan just implemented, too soon to assess goals progression  [] Other:     ASSESSMENT:  See eval    Treatment/Activity Tolerance:  [] Patient tolerated treatment well [] Patient limited by fatique  [] Patient limited by pain  [] Patient limited by other medical complications  [] Other:     Prognosis: [] Good [] Fair  [] Poor        Recommend pt follow through with surgery date, recommend DC to home following sx.     Patient Requires Follow-up: [] Yes  [x] No    PLAN: See eval      [] Continue per plan of care [] Alter current plan (see comments)  [] Plan of care initiated [x] Discharge    Electronically signed by: Connie Ford PT, PT

## 2021-10-25 NOTE — PLAN OF CARE
[facility], [address] [facility]  [address] [place] [address], [address] [place] [address]  Phone: [phone]  Fax [phone]     Physical Therapy Certification    Dear Referring Practitioner: Dr. María Elena Lopez,    We had the pleasure of evaluating the following patient for physical therapy services at [address] [place] [address]. A summary of our findings can be found in the initial assessment below. This includes our plan of care. If you have any questions or concerns regarding these findings, please do not hesitate to contact me at the office phone number checked above. Thank you for the referral.       Physician Signature:_______________________________Date:__________________  By signing above (or electronic signature), therapists plan is approved by physician    Patient: Denita Serrano   : 1940   MRN: [id number]  Referring Physician: Referring Practitioner: Dr. María Elena Lopez      Evaluation Date: 10/25/2021      Medical Diagnosis Information:  Diagnosis: M16.12 (ICD-10-CM) - Primary osteoarthritis of left hip   PREHAB   Treatment Diagnosis: left hip pain M25.552                                         Insurance information: PT Insurance Information: [facility]     Precautions/ Contra-indications: HBP  Latex Allergy:  [x]NO      []YES  Preferred Language for Healthcare:   [x]English       []other:    SUBJECTIVE: Patient stated complaint:Pt had left knee replaced 6 years ago. Left hip started bothering him 1 year ago. Hard to don and doff left shoe and sock. Has discomfort with getting in and out of passenger seat of car. Pt reports that he sleeps well. Walking can be painful. Pt's wife will help him after surgery. He has three stairs to get into home. Bed and bathroom (with step in tub) are on the first floor. Walk in shower is  In basment.        Relevant Medical History:vertigo, left TKR, allergic to pencillen  Functional Disability Index: LEFS  42/80= 48%    Height: 5'9\" Weight: 225  Pain Scale: 4-8/10  Easing factors: rest  Provocative factors: walking dog, kneeling, self care, position change, squatting, lifting, driving, stairs. Type: []Constant   []Intermittent  []Radiating []Localized []other:     Numbness/Tingling: n/a[]    Occupation/School:retired [employer] . Living Status/Prior Level of Function: Independent with ADLs and IADLs, walking dog. [x] Patient history, allergies, meds reviewed. Medical chart reviewed. See intake form. Review Of Systems (ROS):  [x]Performed Review of systems (Integumentary, CardioPulmonary, Neurological) by intake and observation. Intake form has been scanned into medical record. Patient has been instructed to contact their primary care physician regarding ROS issues if not already being addressed at this time.       Co-morbidities/Complexities (which will affect course of rehabilitation):   []None           Arthritic conditions   []Rheumatoid arthritis (M05.9)  [x]Osteoarthritis (M19.91)   Cardiovascular conditions   [x]Hypertension (I10)  []Hyperlipidemia (E78.5)  []Angina pectoris (I20)  []Atherosclerosis (I70)   Musculoskeletal conditions   []Disc pathology   []Congenital spine pathologies   []Prior surgical intervention  []Osteoporosis (M81.8)  []Osteopenia (M85.8)   Endocrine conditions   []Hypothyroid (E03.9)  []Hyperthyroid Gastrointestinal conditions   []Constipation (F43.33)   Metabolic conditions   []Morbid obesity (E66.01)  []Diabetes type 1(E10.65) or 2 (E11.65)   []Neuropathy (G60.9)     Pulmonary conditions   []Asthma (J45)  []Coughing   []COPD (J44.9)   Psychological Disorders  []Anxiety (F41.9)  []Depression (F32.9)   []Other:   []Other:          Barriers to/and or personal factors that will affect rehab potential:              []Age  []Sex              []Motivation/Lack of Motivation                        []Co-Morbidities []Cognitive Function, education/learning barriers              []Environmental, home barriers              []profession/work barriers  []past PT/medical experience  []other:  Justification: Pt will benefit from surgery. Falls Risk Assessment (30 days):   [x] Falls Risk assessed and no intervention required. [] Falls Risk assessed and Patient requires intervention due to being higher risk   TUG score (>12s at risk):     [] Falls education provided, including       G-Codes:   42/80 = 48%    ASSESSMENT:   Functional Impairments:     [x]Noted lumbar/proximal hip/LE joint hypomobility   [x]Decreased LE functional ROM   [x]Decreased core/proximal hip strength and neuromuscular control   [x]Decreased LE functional strength   [x]Reduced balance/proprioceptive control   []other:      Functional Activity Limitations (from functional questionnaire and intake)   []Reduced ability to tolerate prolonged functional positions   [x]Reduced ability or difficulty with changes of positions or transfers between positions   [x]Reduced ability to maintain good posture and demonstrate good body mechanics with sitting, bending, and lifting   [x]Reduced ability to sleep   [x] Reduced ability or tolerance with driving and/or computer work   [x]Reduced ability to perform lifting, carrying tasks   [x]Reduced ability to squat   [x]Reduced ability to forward bend   [x]Reduced ability to ambulate prolonged functional periods/distances/surfaces   [x]Reduced ability to ascend/descend stairs   []Reduced ability to run, hop, cut or jump   []other:    Participation Restrictions   [x]Reduced participation in self care activities   [x]Reduced participation in home management activities   []Reduced participation in work activities   []Reduced participation in social activities. [x]Reduced participation in sport/recreation activities.     Classification :    []Signs/symptoms consistent with post-surgical status including decreased ROM, strength and function. []Signs/symptoms consistent with joint sprain/strain   []Signs/symptoms consistent with patella-femoral syndrome   [x]Signs/symptoms consistent with knee OA/hip OA   []Signs/symptoms consistent with internal derangement of knee/Hip   []Signs/symptoms consistent with functional hip weakness/NMR control      []Signs/symptoms consistent with tendinitis/tendinosis    []signs/symptoms consistent with pathology which may benefit from Dry needling      []other:      Prognosis/Rehab Potential:      []Excellent   []Good    [x]Fair   []Poor    Tolerance of evaluation/treatment:    []Excellent   []Good    [x]Fair   []Poor    Physical Therapy Evaluation Complexity Justification  [x] A history of present problem with:  [] no personal factors and/or comorbidities that impact the plan of care;  [x]1-2 personal factors and/or comorbidities that impact the plan of care  []3 personal factors and/or comorbidities that impact the plan of care  [x] An examination of body systems using standardized tests and measures addressing any of the following: body structures and functions (impairments), activity limitations, and/or participation restrictions;:  [] a total of 1-2 or more elements   [x] a total of 3 or more elements   [] a total of 4 or more elements   [x] A clinical presentation with:  [x] stable and/or uncomplicated characteristics   [] evolving clinical presentation with changing characteristics  [] unstable and unpredictable characteristics;   [x] Clinical decision making of [x] low, [] moderate, [] high complexity using standardized patient assessment instrument and/or measurable assessment of functional outcome. [x] EVAL (LOW) 09469 (typically 20 minutes face-to-face)  [] EVAL (MOD) 29997 (typically 30 minutes face-to-face)  [] EVAL (HIGH) 22783 (typically 45 minutes face-to-face)  [] RE-EVAL       PLAN:   Frequency/Duration:  1 prehab visit.    Interventions:  [x]  Therapeutic exercise including: strength

## 2021-11-02 LAB — PSA, TOTAL: 1.87 NG/ML (ref 0–4)

## 2021-11-17 ENCOUNTER — NURSE TRIAGE (OUTPATIENT)
Dept: OTHER | Facility: CLINIC | Age: 81
End: 2021-11-17

## 2021-11-17 ENCOUNTER — TELEPHONE (OUTPATIENT)
Dept: ORTHOPEDIC SURGERY | Age: 81
End: 2021-11-17

## 2021-11-17 NOTE — TELEPHONE ENCOUNTER
Received call from [address]  at [facility], caller not on line. Complaint: elbow swelling     Market: [facility]     Practice Name: Candis padron     Caller's telephone number verified as [phone]    Connected with caller via phone, please see below triage              Received call from [address] at [facility] with Red Flag Complaint. Brief description of triage: 6 y/o with left elbow swelling, redness and tenderness that appeared today  When waking up     Triage indicates for patient to see pcp in the next 24 hours      Pt has an appointment scheduled for tomorrow     Care advice provided, patient verbalizes understanding; denies any other questions or concerns; instructed to call back for any new or worsening symptoms. Attention Provider: Thank you for allowing me to participate in the care of your patient. The patient was connected to triage in response to information provided to the ECC/PSC. Please do not respond through this encounter as the response is not directed to a shared pool. Reason for Disposition   [1] Painful joint AND [2] no fever    Answer Assessment - Initial Assessment Questions  1. LOCATION: \"Where is the swelling? \" (e.g., left, right, both elbows)      Left  Elbow     2. SIZE and DESCRIPTION: \"What does the swelling look like? \" (e.g., entire elbow, localized)     At the elbow     3. ONSET: \"When did the swelling start? \" \"Does it come and go, or is it there all the time? \"     Today     4. SETTING: \"Has there been any recent work, exercise or other activity that involved that part of the body? \"       Denies     5. AGGRAVATING FACTORS: \"What makes the elbow swelling worse? \" (e.g., work, sports activities)       6. ASSOCIATED SYMPTOMS: \"Is there any pain or redness? \"     Red   7. OTHER SYMPTOMS: \"Do you have any other symptoms? \" (e.g., fever)     Denies     8. PREGNANCY: \"Is there any chance you are pregnant? \" \"When was your last menstrual period? \"    Protocols used: ELBOW SWELLING-ADULT-AH

## 2021-11-18 ENCOUNTER — TELEPHONE (OUTPATIENT)
Dept: ORTHOPEDIC SURGERY | Age: 81
End: 2021-11-18

## 2021-11-18 ENCOUNTER — OFFICE VISIT (OUTPATIENT)
Dept: FAMILY MEDICINE CLINIC | Age: 81
End: 2021-11-18
Payer: MEDICARE

## 2021-11-18 VITALS
HEART RATE: 49 BPM | DIASTOLIC BLOOD PRESSURE: 60 MMHG | OXYGEN SATURATION: 97 % | SYSTOLIC BLOOD PRESSURE: 138 MMHG | WEIGHT: 231.4 LBS | BODY MASS INDEX: 34.17 KG/M2 | RESPIRATION RATE: 16 BRPM

## 2021-11-18 DIAGNOSIS — M77.12 EPICONDYLITIS, LATERAL, LEFT: Primary | ICD-10-CM

## 2021-11-18 DIAGNOSIS — L03.114 CELLULITIS OF LEFT UPPER EXTREMITY: ICD-10-CM

## 2021-11-18 DIAGNOSIS — I10 ESSENTIAL HYPERTENSION: ICD-10-CM

## 2021-11-18 LAB
BASOPHILS ABSOLUTE: 0.1 K/UL (ref 0–0.2)
BASOPHILS RELATIVE PERCENT: 1 %
EOSINOPHILS ABSOLUTE: 0.3 K/UL (ref 0–0.6)
EOSINOPHILS RELATIVE PERCENT: 4.6 %
HCT VFR BLD CALC: 39.4 % (ref 40.5–52.5)
HEMOGLOBIN: 13 G/DL (ref 13.5–17.5)
LYMPHOCYTES ABSOLUTE: 1.5 K/UL (ref 1–5.1)
LYMPHOCYTES RELATIVE PERCENT: 23.4 %
MCH RBC QN AUTO: 27.1 PG (ref 26–34)
MCHC RBC AUTO-ENTMCNC: 32.9 G/DL (ref 31–36)
MCV RBC AUTO: 82.4 FL (ref 80–100)
MONOCYTES ABSOLUTE: 0.4 K/UL (ref 0–1.3)
MONOCYTES RELATIVE PERCENT: 7.2 %
NEUTROPHILS ABSOLUTE: 4 K/UL (ref 1.7–7.7)
NEUTROPHILS RELATIVE PERCENT: 63.8 %
PDW BLD-RTO: 16.5 % (ref 12.4–15.4)
PLATELET # BLD: 238 K/UL (ref 135–450)
PMV BLD AUTO: 8.7 FL (ref 5–10.5)
RBC # BLD: 4.78 M/UL (ref 4.2–5.9)
SEDIMENTATION RATE, ERYTHROCYTE: 35 MM/HR (ref 0–20)
URIC ACID, SERUM: 4.9 MG/DL (ref 3.5–7.2)
WBC # BLD: 6.2 K/UL (ref 4–11)

## 2021-11-18 PROCEDURE — 99213 OFFICE O/P EST LOW 20 MIN: CPT | Performed by: NURSE PRACTITIONER

## 2021-11-18 RX ORDER — SULFAMETHOXAZOLE AND TRIMETHOPRIM 800; 160 MG/1; MG/1
1 TABLET ORAL 2 TIMES DAILY
Qty: 14 TABLET | Refills: 0 | Status: SHIPPED | OUTPATIENT
Start: 2021-11-18 | End: 2021-11-25

## 2021-11-18 RX ORDER — METHYLPREDNISOLONE 4 MG/1
TABLET ORAL
Qty: 1 KIT | Refills: 0 | Status: SHIPPED | OUTPATIENT
Start: 2021-11-18 | End: 2021-11-24

## 2021-11-18 NOTE — TELEPHONE ENCOUNTER
COVID: 12/7/21- negative    Orthopedic Nurse Navigator Summary  -  Patient Name: Sarina Villalobos  Anticipated Date of Surgery:  Using OrthoVitals? Yes, Are they Registered: Yes  Attended Pre-Op Education Class: No  If No, why not? PCP:  Phone #:  Date of PCP Visit for H&P: 11/29/2021  Any Noted Concerns from PCP prior to surgery: No  If Yes, what concerns?:  Is the Patient in a Pain Management Program?: No  Review of Past Medical History Reveals History of:  -  Critical Lab Values:  - Hemoglobin (g/dL): Date Value 12.6  - Hematocrit (%): Date Value  - HgbA1C : Date Value 4.9  - Albumin : Date Value 4.2  - BUN (mg/dL) : Date Value 11.0  - CREATININE (mg/dL) : Date Value 0.5  - BMI (kg/m2) : Date Value 33.0  -  Coronary Artery Disease/HTN/CHF History: Yes  Cardiologist: Aortic Stenosis  Cardiac Clearance Necessary: Yes  Date of Cardiac Clearance Appt: 10/14/2021  On Plavix? No  If YES, when will they stop taking? Final Cardiac Recommendations: Cleared for surgery - Eliquis to hold 3 days prior to surgery  On any anticoagulation: Yes  -  Diabetes History: No  Most Recent HgbA1C: 4.9  PCP or Endocrine Recommendations:  Nutritionist/Dietician Consult Scheduled:  Final Plan For Diabetic Control:  Pulmonary: COPD/Emphysema/ Use of home oxygen: NONE  Alcohol use: Casual Drinker  -  DVT Risk Stratification: Low Risk  Vascular Consult Ordered:  Date of Vascular Appt:  Hematology/Oncology Consult Ordered:  Date of Hematology/Oncology Appt:  Final Recommendation For DVT Prophylaxis:  Smoking history: Non-Smoker  Use of Estrogen:  -  BMI Greater than 40 at time of scheduling?: No  Has Surgeon been notified of BMI concern? No  Weight Loss Clinic Consult Ordered No  Date of Wt Loss Clinic Appt:  BMI at time of surgery (if went through [facility]):  -  Additional Medical Concerns:   Additional Recommendations for above concerns:  Attended Pre-Hab Program: Yes  Anticipated Discharge Disposition: Home with OPT  Who will be with patient at home following discharge?  wife and brother to come help  Equipment patient already has: walker-standard  Bedroom on first or second floor: first  Bathroom on first or second floor: first  Weight bearing status: full  Pre-op ambulatory status:  Number of entry steps: 3 steps into home  Caregiver assistance: full time  -HHC: [facility] See  12/07/2021

## 2021-11-18 NOTE — PROGRESS NOTES
Ermias Anaya (:  1940) is a [de-identified] y.o. male,Established patient, here for evaluation of the following chief complaint(s):  Elbow Pain (L x1 day, red/swelling)         ASSESSMENT/PLAN:  1. Epicondylitis, lateral, left  -     Sedimentation Rate  -     Uric Acid  -     CBC Auto Differential  2. Cellulitis of left upper extremity  -     CBC Auto Differential  -     methylPREDNISolone (MEDROL, CLAUDE,) 4 MG tablet; As directed, Disp-1 kit, R-0Normal  -     sulfamethoxazole-trimethoprim (BACTRIM DS) 800-160 MG per tablet; Take 1 tablet by mouth 2 times daily for 7 days, Disp-14 tablet, R-0Normal    Apply ice pack 4 times daily for 15-20 minutes. Wrap in towel, etc to avoid the coldness directly to the skin. Has appt Monday with Dr. Sheryl Gonzalez. No follow-ups on file. Subjective   SUBJECTIVE/OBJECTIVE:  HPI     Monday night was watching TV and noted discomfort left elbow. 3AM pain worsened, not able to sleep. Tuesday slept well but still with discomfort. Then swelling. Applied ice with some relief. No known injury. No previous symptoms. Review of Systems   All other systems reviewed and are negative. Objective   Physical Exam  Constitutional:       Appearance: Normal appearance. Cardiovascular:      Rate and Rhythm: Normal rate. Pulmonary:      Breath sounds: Normal breath sounds. Skin:     Comments: Redness left lateral epicondyle. Skin warm, mild edema. ROM elbow, non tender to palpation. Olecranon process not affected. Neurological:      Mental Status: He is alert and oriented to person, place, and time.                   An electronic signature was used to authenticate this note.    --CHAUNCEY AJ, APRN - CNP

## 2021-11-19 ENCOUNTER — TELEPHONE (OUTPATIENT)
Dept: ORTHOPEDIC SURGERY | Age: 81
End: 2021-11-19

## 2021-11-19 RX ORDER — LISINOPRIL 20 MG/1
TABLET ORAL
Qty: 45 TABLET | Refills: 1 | Status: SHIPPED | OUTPATIENT
Start: 2021-11-19 | End: 2022-09-02 | Stop reason: SDUPTHER

## 2021-11-22 ENCOUNTER — OFFICE VISIT (OUTPATIENT)
Dept: FAMILY MEDICINE CLINIC | Age: 81
End: 2021-11-22
Payer: MEDICARE

## 2021-11-22 VITALS
OXYGEN SATURATION: 97 % | SYSTOLIC BLOOD PRESSURE: 136 MMHG | HEART RATE: 52 BPM | DIASTOLIC BLOOD PRESSURE: 64 MMHG | BODY MASS INDEX: 33.37 KG/M2 | WEIGHT: 226 LBS

## 2021-11-22 DIAGNOSIS — I10 ESSENTIAL HYPERTENSION: ICD-10-CM

## 2021-11-22 DIAGNOSIS — R73.03 PRE-DIABETES: ICD-10-CM

## 2021-11-22 DIAGNOSIS — Z00.00 ROUTINE GENERAL MEDICAL EXAMINATION AT A HEALTH CARE FACILITY: Primary | ICD-10-CM

## 2021-11-22 DIAGNOSIS — L03.114 CELLULITIS OF LEFT UPPER EXTREMITY: ICD-10-CM

## 2021-11-22 DIAGNOSIS — E78.2 MIXED HYPERLIPIDEMIA: ICD-10-CM

## 2021-11-22 PROCEDURE — G0439 PPPS, SUBSEQ VISIT: HCPCS | Performed by: FAMILY MEDICINE

## 2021-11-22 PROCEDURE — 99214 OFFICE O/P EST MOD 30 MIN: CPT | Performed by: FAMILY MEDICINE

## 2021-11-22 SDOH — ECONOMIC STABILITY: FOOD INSECURITY: WITHIN THE PAST 12 MONTHS, YOU WORRIED THAT YOUR FOOD WOULD RUN OUT BEFORE YOU GOT MONEY TO BUY MORE.: NEVER TRUE

## 2021-11-22 SDOH — ECONOMIC STABILITY: FOOD INSECURITY: WITHIN THE PAST 12 MONTHS, THE FOOD YOU BOUGHT JUST DIDN'T LAST AND YOU DIDN'T HAVE MONEY TO GET MORE.: NEVER TRUE

## 2021-11-22 ASSESSMENT — PATIENT HEALTH QUESTIONNAIRE - PHQ9
SUM OF ALL RESPONSES TO PHQ QUESTIONS 1-9: 0
SUM OF ALL RESPONSES TO PHQ QUESTIONS 1-9: 0
2. FEELING DOWN, DEPRESSED OR HOPELESS: 0
SUM OF ALL RESPONSES TO PHQ QUESTIONS 1-9: 0
1. LITTLE INTEREST OR PLEASURE IN DOING THINGS: 0
SUM OF ALL RESPONSES TO PHQ9 QUESTIONS 1 & 2: 0

## 2021-11-22 ASSESSMENT — LIFESTYLE VARIABLES
AUDIT TOTAL SCORE: 4
HOW OFTEN DURING THE LAST YEAR HAVE YOU FAILED TO DO WHAT WAS NORMALLY EXPECTED FROM YOU BECAUSE OF DRINKING: 0
HOW OFTEN DO YOU HAVE A DRINK CONTAINING ALCOHOL: 4
HOW OFTEN DURING THE LAST YEAR HAVE YOU NEEDED AN ALCOHOLIC DRINK FIRST THING IN THE MORNING TO GET YOURSELF GOING AFTER A NIGHT OF HEAVY DRINKING: 0
HOW OFTEN DURING THE LAST YEAR HAVE YOU FOUND THAT YOU WERE NOT ABLE TO STOP DRINKING ONCE YOU HAD STARTED: 0
HOW OFTEN DURING THE LAST YEAR HAVE YOU HAD A FEELING OF GUILT OR REMORSE AFTER DRINKING: 0
HOW OFTEN DURING THE LAST YEAR HAVE YOU BEEN UNABLE TO REMEMBER WHAT HAPPENED THE NIGHT BEFORE BECAUSE YOU HAD BEEN DRINKING: 0
HOW MANY STANDARD DRINKS CONTAINING ALCOHOL DO YOU HAVE ON A TYPICAL DAY: 0
HOW OFTEN DO YOU HAVE SIX OR MORE DRINKS ON ONE OCCASION: 0
AUDIT-C TOTAL SCORE: 4
HAS A RELATIVE, FRIEND, DOCTOR, OR ANOTHER HEALTH PROFESSIONAL EXPRESSED CONCERN ABOUT YOUR DRINKING OR SUGGESTED YOU CUT DOWN: 0
HAVE YOU OR SOMEONE ELSE BEEN INJURED AS A RESULT OF YOUR DRINKING: 0

## 2021-11-22 ASSESSMENT — SOCIAL DETERMINANTS OF HEALTH (SDOH): HOW HARD IS IT FOR YOU TO PAY FOR THE VERY BASICS LIKE FOOD, HOUSING, MEDICAL CARE, AND HEATING?: NOT HARD AT ALL

## 2021-11-22 NOTE — PROGRESS NOTES
Alecia French is a [de-identified] y.o. male    Chief Complaint   Patient presents with   [facility] Linear Exam     Dr. Chandler Singh; Left hip replacement     Diabetes    Hypertension    Hyperlipidemia       HPI:    Diabetes  He presents for his follow-up diabetic visit. He has type 2 diabetes mellitus. His disease course has been stable. Pertinent negatives for hypoglycemia include no dizziness. Pertinent negatives for diabetes include no polydipsia. Risk factors for coronary artery disease include male sex, diabetes mellitus and hypertension. Current diabetic treatment includes oral agent (monotherapy). An ACE inhibitor/angiotensin II receptor blocker is being taken. Hypertension  This is a chronic problem. The current episode started more than 1 year ago. The problem is unchanged. The problem is controlled. Risk factors for coronary artery disease include diabetes mellitus. Past treatments include ACE inhibitors, beta blockers and diuretics. The current treatment provides significant improvement. There are no compliance problems. Hyperlipidemia  This is a chronic problem. The current episode started more than 1 year ago. The problem is controlled. Recent lipid tests were reviewed and are normal. Exacerbating diseases include diabetes. Pertinent negatives include no myalgias. Current antihyperlipidemic treatment includes statins. The current treatment provides significant improvement of lipids. There are no compliance problems. Risk factors for coronary artery disease include diabetes mellitus, hypertension, male sex and obesity. Cellulitis of the left upper extremity. The patient was seen a couple of weeks ago and was given Bactrim and steroid taper which has helped his symptoms. His left elbow has erythema that is improving. Pain is improving as well. Patient will get a left hip replacement on December 13. ROS:    Review of Systems   Endocrine: Negative for polydipsia.    Musculoskeletal: Negative Assessment form, are documented in Flowsheets linked to this Encounter. Allergies   Allergen Reactions    Pcn [Penicillins] Hives         Prior to Visit Medications    Medication Sig Taking?  Authorizing Provider   mupirocin (BACTROBAN) 2 % ointment Apply twice daily for 5 days to each nare, begin 12/08/2021  Tracy Redmond MD   lisinopril (PRINIVIL;ZESTRIL) 20 MG tablet TAKE 1/2 TABLET (10 mg) BY MOUTH ONE TIME A DAY  Nacho Banegas MD   methylPREDNISolone (MEDROL, CLAUDE,) 4 MG tablet As directed  GORDON Montague CNP   sulfamethoxazole-trimethoprim (BACTRIM DS) 800-160 MG per tablet Take 1 tablet by mouth 2 times daily for 7 days  GORDON Montague CNP   apixaban (ELIQUIS) 5 MG TABS tablet Take 1 tablet by mouth 2 times daily  Nacho Banegas MD   metFORMIN (GLUCOPHAGE) 500 MG tablet Take 1 tablet by mouth daily (with breakfast)  Cameronllhector ParsonsDO   atorvastatin (LIPITOR) 40 MG tablet TAKE 1 TABLET BY MOUTH ONE TIME A DAY   Camilla Rios DO   atenolol-chlorthalidone (TENORETIC) 100-25 MG per tablet Take 1 tablet by mouth daily  Patient taking differently: Take 0.5 tablets by mouth daily   Camilla Rios DO   Multiple Vitamins-Minerals (THERAPEUTIC MULTIVITAMIN-MINERALS) tablet Take 1 tablet by mouth daily  Historical Provider, MD         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    Diabetes mellitus ([address]     ED (erectile dysfunction)     Erectile dysfunction 2/2/2016    Hyperlipidemia     Hypertension     Nocturia     Pre-diabetes     Prostate CA ([address]     seeds implanted approx 2004       Past Surgical History:   Procedure Laterality Date    CATARACT REMOVAL Bilateral 03/2017    COLONOSCOPY  9/24/14    multiple polyp    JOINT REPLACEMENT Left 9/1/2015    left total knee replacement    KNEE ARTHROSCOPY      left knee    KNEE SURGERY Left 1963    PROSTATE SURGERY      TONSILLECTOMY           Family History Problem Relation Age of Onset    Cancer Father         leukemia       CareTeam (Including outside providers/suppliers regularly involved in providing care):   Patient Care Team:  Antwan Donnelly DO as PCP - General (Family Medicine)  Antwan Donnelly DO [facility]CP [facility]  Madeleine Parra RN as Registered Nurse    Wt Readings from Last 3 Encounters:   11/22/21 226 lb (102.5 kg)   11/18/21 231 lb 6.4 oz (105 kg)   10/14/21 232 lb 8 oz (105.5 kg)     Vitals:    11/22/21 1427   BP: 136/64   Pulse: 52   SpO2: 97%   Weight: 226 lb (102.5 kg)     Body mass index is 33.37 kg/m². Based upon direct observation of the patient, evaluation of cognition reveals recent and remote memory intact. Patient's complete Health Risk Assessment and screening values have been reviewed and are found in Flowsheets. The following problems were reviewed today and where indicated follow up appointments were made and/or referrals ordered. Positive Risk Factor Screenings with Interventions:            General Health and ACP:  General  In general, how would you say your health is?: Good  In the past 7 days, have you experienced any of the following?  New or Increased Pain, New or Increased Fatigue, Loneliness, Social Isolation, Stress or Anger?: (!) None of These, New or Increased Pain  Do you get the social and emotional support that you need?: Yes  Do you have a Living Will?: Yes  Advance Directives     Power of [address] [place] [address] Will ACP-Advance Directive ACP-Power of     Not on File Not on File Not on File Not on File      General Health Risk Interventions:  · Pain issues: going for hip replacement soon    Health Habits/Nutrition:  Health Habits/Nutrition  Do you exercise for at least 20 minutes 2-3 times per week?: Yes  Have you lost any weight without trying in the past 3 months?: No  Do you eat only one meal per day?: No  Have you seen the dentist within the past year?: Yes     Health Habits/Nutrition orders for this visit:    Routine general medical examination at a health care facility    Pre-diabetes    Essential hypertension    Mixed hyperlipidemia    Cellulitis of left upper extremity

## 2021-11-22 NOTE — PATIENT INSTRUCTIONS
Personalized Preventive Plan for Clayton Peter - 11/22/2021  Medicare offers a range of preventive health benefits. Some of the tests and screenings are paid in full while other may be subject to a deductible, co-insurance, and/or copay. Some of these benefits include a comprehensive review of your medical history including lifestyle, illnesses that may run in your family, and various assessments and screenings as appropriate. After reviewing your medical record and screening and assessments performed today your provider may have ordered immunizations, labs, imaging, and/or referrals for you. A list of these orders (if applicable) as well as your Preventive Care list are included within your After Visit Summary for your review. Other Preventive Recommendations:    · A preventive eye exam performed by an eye specialist is recommended every 1-2 years to screen for glaucoma; cataracts, macular degeneration, and other eye disorders. · A preventive dental visit is recommended every 6 months. · Try to get at least 150 minutes of exercise per week or 10,000 steps per day on a pedometer . · Order or download the FREE \"Exercise & Physical Activity: Your Everyday Guide\" from [employer]. Call 7-255.162.4372 or search [employer] online. · You need 4692-7955 mg of calcium and 5356-2793 IU of vitamin D per day. It is possible to meet your calcium requirement with diet alone, but a vitamin D supplement is usually necessary to meet this goal.  · When exposed to the sun, use a sunscreen that protects against both UVA and UVB radiation with an SPF of 30 or greater. Reapply every 2 to 3 hours or after sweating, drying off with a towel, or swimming. · Always wear a seat belt when traveling in a car. Always wear a helmet when riding a bicycle or motorcycle.

## 2021-11-22 NOTE — TELEPHONE ENCOUNTER
Thank you for the quick response! Will sign off.      Rd Hendrickson, PharmD, [place] // Department, toll free [phone], option 1      For Hubert Mcdermotts in place:  No   Recommendation Provided To: Provider: 1 via Note to Provider   Intervention Detail: New Rx: 1, reason: Improve Adherence   Gap Closed?: Yes    Intervention Accepted By: Provider: 1   Time Spent (min): 15

## 2021-11-26 ENCOUNTER — HOSPITAL ENCOUNTER (EMERGENCY)
Age: 81
Discharge: HOME | End: 2021-11-26
Payer: MEDICARE

## 2021-11-26 VITALS
BODY MASS INDEX: 33.47 KG/M2 | DIASTOLIC BLOOD PRESSURE: 51 MMHG | OXYGEN SATURATION: 98 % | WEIGHT: 226 LBS | SYSTOLIC BLOOD PRESSURE: 121 MMHG | TEMPERATURE: 98.1 F | HEIGHT: 69 IN | HEART RATE: 57 BPM | RESPIRATION RATE: 16 BRPM

## 2021-11-26 DIAGNOSIS — L27.0 DRUG RASH: Primary | ICD-10-CM

## 2021-11-26 PROCEDURE — 99283 EMERGENCY DEPT VISIT LOW MDM: CPT

## 2021-11-26 NOTE — ED PROVIDER NOTES
[address] Emergency Department    CHIEF COMPLAINT  Rash (started last night on hands, arms, and trunk)      SHARED SERVICE VISIT  Evaluated by DONI. My supervising physician was available for consultation. HISTORY OF PRESENT ILLNESS  Micki Pereira is a [de-identified] y.o. male who presents to the ED complaining of a rash that began last night. Patient states the rash is across his bilateral arms chest, back, buttocks and legs. Denies any fevers or chills. Denies any sloughing or shedding of his skin. Patient recently finished taking Bactrim yesterday which he was prescribed a 5-day course for believed cellulitis of his upper extremity. He was also on a prednisone taper as well. Prior history of any drug reactions in the past.  Denies any blistering myalgias, muscle pains nausea vomiting or diarrhea. Denies any fatigue or weakness. No other complaints, modifying factors or associated symptoms. Nursing notes reviewed.    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    Diabetes mellitus ([address]     ED (erectile dysfunction)     Erectile dysfunction 2/2/2016    Hyperlipidemia     Hypertension     Nocturia     Pre-diabetes     Prostate CA ([address]     seeds implanted approx 2004     Past Surgical History:   Procedure Laterality Date    CATARACT REMOVAL Bilateral 03/2017    COLONOSCOPY  9/24/14    multiple polyp    JOINT REPLACEMENT Left 9/1/2015    left total knee replacement    KNEE ARTHROSCOPY      left knee    KNEE SURGERY Left 1963    PROSTATE SURGERY      TONSILLECTOMY       Family History   Problem Relation Age of Onset    Cancer Father         leukemia     Social History     Socioeconomic History    Marital status:      Spouse name: Not on file    Number of children: Not on file    Years of education: Not on file    Highest education level: Not on file   Occupational History    Not on file   Tobacco Use    Smoking status: Never Smoker    Smokeless tobacco: Never Used   Substance and Sexual Activity    Alcohol use: Yes     Comment: occ    Drug use: No    Sexual activity: Not on file   Other Topics Concern    Not on file   Social History Narrative    Not on file     Social Determinants of Health     Financial Resource Strain: Low Risk     Difficulty of Paying Living Expenses: Not hard at all   Food Insecurity: No Food Insecurity    Worried About Running Out of Food in the Last Year: Never true    [address] in the Last Year: Never true   Transportation Needs:     Lack of Transportation (Medical): Not on file    Lack of Transportation (Non-Medical): Not on file   Physical Activity:     Days of Exercise per Week: Not on file    Minutes of Exercise per Session: Not on file   Stress:     Feeling of Stress : Not on file   Social Connections:     Frequency of Communication with Friends and Family: Not on file    Frequency of Social Gatherings with Friends and Family: Not on file    Attends Gnosticism Services: Not on file    Active Member of [address] [place] [address] or Organizations: Not on file    Attends Club or Organization Meetings: Not on file    Marital Status: Not on file   Intimate Partner Violence:     Fear of Current or Ex-Partner: Not on file    Emotionally Abused: Not on file    Physically Abused: Not on file    Sexually Abused: Not on file   Housing Stability:     Unable to Pay for Housing in the Last Year: Not on file    Number of [place] in the Last Year: Not on file    Unstable Housing in the Last Year: Not on file     No current facility-administered medications for this encounter. Current Outpatient Medications   Medication Sig Dispense Refill    hydrocortisone 2.5 % cream Apply topically 2 times daily.  30 g 0    mupirocin (BACTROBAN) 2 % ointment Apply twice daily for 5 days to each nare, begin 12/08/2021 1 g 0    lisinopril (PRINIVIL;ZESTRIL) 20 MG tablet TAKE 1/2 TABLET (10 mg) BY MOUTH ONE TIME A DAY 45 tablet 1    apixaban (ELIQUIS) 5 MG TABS tablet Take 1 tablet by mouth 2 times daily 180 tablet 3    metFORMIN (GLUCOPHAGE) 500 MG tablet Take 1 tablet by mouth daily (with breakfast) 90 tablet 3    atorvastatin (LIPITOR) 40 MG tablet TAKE 1 TABLET BY MOUTH ONE TIME A DAY  90 tablet 1    atenolol-chlorthalidone (TENORETIC) 100-25 MG per tablet Take 1 tablet by mouth daily (Patient taking differently: Take 0.5 tablets by mouth daily ) 90 tablet 3    Multiple Vitamins-Minerals (THERAPEUTIC MULTIVITAMIN-MINERALS) tablet Take 1 tablet by mouth daily       Allergies   Allergen Reactions    Bactrim [Sulfamethoxazole-Trimethoprim] Other (See Comments)     Drug rash    Pcn [Penicillins] Hives       REVIEW OF SYSTEMS  7 systems reviewed, pertinent positives per HPI otherwise noted to be negative    PHYSICAL EXAM  BP (!) 129/45   Pulse 50   Temp 98.1 °F (36.7 °C) (Oral)   Resp 18   Ht 5' 9\" (1.753 m)   Wt 226 lb (102.5 kg)   SpO2 99%   BMI 33.37 kg/m²   GENERAL APPEARANCE: Awake and alert. Cooperative. No distress. HEAD: Normocephalic. Atraumatic. EYES: EOM grossly intact. ENT: Mucous membranes are moist.  No lesions appreciated within the oropharynx. NECK: Supple. HEART: RRR. No murmurs. LUNGS: Respirations unlabored. CTAB. Good air exchange. Speaking comfortably in full sentences. EXTREMITIES: No peripheral edema. Moves all extremities equally. SKIN: Diffuse,, morbilliform polymorphous erythematous rash across the upper extremity, chest, back as well as buttocks and lower extremities. Rash does cross the midline and is not isolated to one dermatome. Negative Nikolsky sign. No bullae appreciated. NEUROLOGICAL: Alert and oriented. No gross facial drooping. PSYCHIATRIC: Normal mood and affect.     RADIOLOGY  No orders to display         LABS  Labs Reviewed - No data to display    PROCEDURES  Unless otherwise noted below, none  Procedures    MDM  70-year-old male presents emergency department for evaluation of suspected drug rash. He just finished taking Bactrim yesterday and last night developed a diffuse, erythematous morbilliform rash across his trunk upper extremities and buttocks. There is no bullae appreciated. Negative Nikolsky signs. Is nonexfoliative. Noncellulitic appearance. Low suspicion for shingles as well as it crosses the midline as well as not isolated to one dermatome. Discussed with the patient signs symptoms of what to watch out for such as worsening, fevers or sloughing of his skin. On arrival patient is afebrile nontachycardic. He has no GI symptoms or lesions within his oropharynx appreciated. No malaise more muscle weaknesses. This time low suspicion for TENS/JS. Will have patient follow-up with primary care provider. I did provide with topical steroids to help relieve the pruritus. Did inform the patient to avoid taking any more Bactrim would recommend avoiding this medication in the future. DISPOSITION  Patient was discharged to home in good condition. CLINICAL IMPRESSION  1.  Drug rash            Royer Daigle PA-C  11/26/21 3148

## 2021-11-29 ENCOUNTER — VIRTUAL VISIT (OUTPATIENT)
Dept: FAMILY MEDICINE CLINIC | Age: 81
End: 2021-11-29
Payer: MEDICARE

## 2021-11-29 ENCOUNTER — TELEPHONE (OUTPATIENT)
Dept: FAMILY MEDICINE CLINIC | Age: 81
End: 2021-11-29

## 2021-11-29 DIAGNOSIS — Z01.818 PREOPERATIVE EXAMINATION: Primary | ICD-10-CM

## 2021-11-29 DIAGNOSIS — L03.114 CELLULITIS OF LEFT UPPER EXTREMITY: ICD-10-CM

## 2021-11-29 DIAGNOSIS — M16.12 PRIMARY OSTEOARTHRITIS OF LEFT HIP: ICD-10-CM

## 2021-11-29 PROCEDURE — 99443 PR PHYS/QHP TELEPHONE EVALUATION 21-30 MIN: CPT | Performed by: FAMILY MEDICINE

## 2021-11-29 ASSESSMENT — PATIENT HEALTH QUESTIONNAIRE - PHQ9
SUM OF ALL RESPONSES TO PHQ QUESTIONS 1-9: 0
SUM OF ALL RESPONSES TO PHQ9 QUESTIONS 1 & 2: 0
SUM OF ALL RESPONSES TO PHQ QUESTIONS 1-9: 0
SUM OF ALL RESPONSES TO PHQ QUESTIONS 1-9: 0
2. FEELING DOWN, DEPRESSED OR HOPELESS: 0
1. LITTLE INTEREST OR PLEASURE IN DOING THINGS: 0

## 2021-11-29 NOTE — TELEPHONE ENCOUNTER
----- Message from Africa Beasley sent at 11/26/2021 10:51 AM EST -----  Subject: Appointment Request    Reason for Call: Urgent Skin Problem    QUESTIONS  Type of Appointment? Established Patient  Reason for appointment request? No appointments available during search  Additional Information for Provider? Patient woke up this morning with a   sore, itchy rash all over. He has no swelling or any other symptoms. Patient's wife is contemplating taking him to Urgent Care or the ED. Please call Amena Hanley, his wife, back at the number listed at the earliest   convenience. Please call the home phone as well @ [phone]  ---------------------------------------------------------------------------  --------------  CALL BACK INFO  What is the best way for the office to contact you? OK to leave message on   voicemail  Preferred Call Back Phone Number?  [phone]  ---------------------------------------------------------------------------  --------------  SCRIPT ANSWERS

## 2021-11-29 NOTE — TELEPHONE ENCOUNTER
----- Message from Angelica Acosta sent at 11/26/2021  9:31 AM EST -----  Subject: Message to Provider    QUESTIONS  Information for Provider? pts wife Kehinde Duncan was calling to schedule her    a follow up appt.  went to ED for trouble with left elbow   Pt says that she called the other day and was trying to get connected and   no answer. ---------------------------------------------------------------------------  --------------  Sarahy AMADOR  What is the best way for the office to contact you? OK to leave message on   voicemail  Preferred Call Back Phone Number?  [phone]  ---------------------------------------------------------------------------  --------------  SCRIPT ANSWERS  undefined

## 2021-11-29 NOTE — TELEPHONE ENCOUNTER
Pt has been seen, at [facility] found out pt has an reaction to Bactrim, with a serve rash, pt ran out of Hydrocotisone 2.5% andhad to go to the store to buy 1%. Pt requested a refill of the 2.5%, which he said works better. Has pend the medicine to be refilled.

## 2021-11-29 NOTE — PROGRESS NOTES
Preoperative Consultation      [address] [place] [address]  YOB: 1940    Date of Service:  11/29/2021    There were no vitals filed for this visit. Wt Readings from Last 2 Encounters:   11/26/21 226 lb (102.5 kg)   11/22/21 226 lb (102.5 kg)     BP Readings from Last 3 Encounters:   11/26/21 (!) 121/51   11/22/21 136/64   11/18/21 138/60        Chief Complaint   Patient presents with    Pre-op Exam     Left hip replacement; 12/13/2021; [facility]     Elbow Injury     Left elbow    Rash      Reaction to Bactrim; was seen at [address] [place] [address]   Allergen Reactions    Bactrim [Sulfamethoxazole-Trimethoprim] Other (See Comments)     Drug rash    Pcn [Penicillins] Hives     Outpatient Medications Marked as Taking for the 11/29/21 encounter (Virtual Visit) with Cordell Camp DO   Medication Sig Dispense Refill    [DISCONTINUED] hydrocortisone 2.5 % cream Apply topically 2 times daily. 30 g 0    mupirocin (BACTROBAN) 2 % ointment Apply twice daily for 5 days to each nare, begin 12/08/2021 1 g 0    lisinopril (PRINIVIL;ZESTRIL) 20 MG tablet TAKE 1/2 TABLET (10 mg) BY MOUTH ONE TIME A DAY 45 tablet 1    apixaban (ELIQUIS) 5 MG TABS tablet Take 1 tablet by mouth 2 times daily 180 tablet 3    metFORMIN (GLUCOPHAGE) 500 MG tablet Take 1 tablet by mouth daily (with breakfast) 90 tablet 3    atorvastatin (LIPITOR) 40 MG tablet TAKE 1 TABLET BY MOUTH ONE TIME A DAY  90 tablet 1    atenolol-chlorthalidone (TENORETIC) 100-25 MG per tablet Take 1 tablet by mouth daily 90 tablet 3    Multiple Vitamins-Minerals (THERAPEUTIC MULTIVITAMIN-MINERALS) tablet Take 1 tablet by mouth daily          This patient presents to the office today for a preoperative consultation at the request of surgeon, Dr. Ifrah Murillo, who plans on performing left hip replacement on December 13 at [facility].  The current problem began 1 year ago, and symptoms have been worsening with time.   Conservative therapy: Yes: cortisone injection, which has been not very effective. .    Planned anesthesia: General   Known anesthesia problems: None   Bleeding risk: Anticoagulant therapy- Eliquis  Personal or FH of DVT/PE: No    Patient objection to receiving blood products: No    Patient Active Problem List   Diagnosis    Essential hypertension    Mixed hyperlipidemia    Morbidly obese ([address]    Erectile dysfunction    Pre-diabetes    Chronic gout without tophus    Nonrheumatic aortic valve stenosis    Abnormal EKG    SOB (shortness of breath)    Atrial fibrillation ([address]    Hypercholesteremia       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    Diabetes mellitus ([address]     ED (erectile dysfunction)     Erectile dysfunction 2/2/2016    Hyperlipidemia     Hypertension     Nocturia     Pre-diabetes     Prostate CA ([address]     seeds implanted approx 2004     Past Surgical History:   Procedure Laterality Date    CATARACT REMOVAL Bilateral 03/2017    COLONOSCOPY  9/24/14    multiple polyp    JOINT REPLACEMENT Left 9/1/2015    left total knee replacement    KNEE ARTHROSCOPY      left knee    KNEE SURGERY Left 1963    PROSTATE SURGERY      TONSILLECTOMY       Family History   Problem Relation Age of Onset    Cancer Father         leukemia     Social History     Socioeconomic History    Marital status:      Spouse name: Not on file    Number of children: Not on file    Years of education: Not on file    Highest education level: Not on file   Occupational History    Not on file   Tobacco Use    Smoking status: Never Smoker    Smokeless tobacco: Never Used   Substance and Sexual Activity    Alcohol use: Yes     Comment: occ    Drug use: No    Sexual activity: Not on file   Other Topics Concern    Not on file   Social History Narrative    Not on file     Social Determinants of Health     Financial Resource Strain: Low Risk     Difficulty of Paying Living

## 2021-12-08 ENCOUNTER — HOSPITAL ENCOUNTER (OUTPATIENT)
Age: 81
Discharge: HOME | End: 2021-12-08
Payer: MEDICARE

## 2021-12-08 PROCEDURE — U0005 INFEC AGEN DETEC AMPLI PROBE: HCPCS

## 2021-12-08 PROCEDURE — U0003 INFECTIOUS AGENT DETECTION BY NUCLEIC ACID (DNA OR RNA); SEVERE ACUTE RESPIRATORY SYNDROME CORONAVIRUS 2 (SARS-COV-2) (CORONAVIRUS DISEASE [COVID-19]), AMPLIFIED PROBE TECHNIQUE, MAKING USE OF HIGH THROUGHPUT TECHNOLOGIES AS DESCRIBED BY CMS-2020-01-R: HCPCS

## 2021-12-09 ENCOUNTER — TELEPHONE (OUTPATIENT)
Dept: ORTHOPEDIC SURGERY | Age: 81
End: 2021-12-09

## 2021-12-09 LAB — SARS-COV-2: NOT DETECTED

## 2021-12-10 ENCOUNTER — ANESTHESIA EVENT (OUTPATIENT)
Dept: OPERATING ROOM | Age: 81
DRG: 470 | End: 2021-12-10
Payer: MEDICARE

## 2021-12-10 NOTE — PROGRESS NOTES
Obstructive Sleep Apnea (JERONIMO) Screening     Patient:  Shari Morel    YOB: 1940      Medical Record #:  [id number]                     Date:  12/10/2021     1. Are you a loud and/or regular snorer? []  Yes       [x] No    2. Have you been observed to gasp or stop breathing during sleep? []  Yes       [x] No    3. Do you feel tired or groggy upon awakening or do you awaken with a headache?           []  Yes       [x] No    4. Are you often tired or fatigued during the wake time hours? []  Yes       [x] No    5. Do you fall asleep sitting, reading, watching TV or driving? []  Yes       [x] No    6. Do you often have problems with memory or concentration? []  Yes       [x] No    If patient's JERONIMO score if greater than or equal to 3, they are considered high risk for JERONIMO. An anesthesia provider will evaluate the patient and develop a plan of care the day of surgery. Note:  If the patient's BMI is more than 35 kg m¯² , has neck circumference > 40 cm, and/or high blood pressure the risk is greater (© American Sleep Apnea Association, 2006).

## 2021-12-13 ENCOUNTER — APPOINTMENT (OUTPATIENT)
Dept: GENERAL RADIOLOGY | Age: 81
DRG: 470 | End: 2021-12-13
Attending: ORTHOPAEDIC SURGERY
Payer: MEDICARE

## 2021-12-13 ENCOUNTER — ANESTHESIA (OUTPATIENT)
Dept: OPERATING ROOM | Age: 81
DRG: 470 | End: 2021-12-13
Payer: MEDICARE

## 2021-12-13 ENCOUNTER — HOSPITAL ENCOUNTER (INPATIENT)
Age: 81
LOS: 1 days | Discharge: HOME | DRG: 470 | End: 2021-12-14
Attending: ORTHOPAEDIC SURGERY | Admitting: ORTHOPAEDIC SURGERY
Payer: MEDICARE

## 2021-12-13 VITALS
RESPIRATION RATE: 19 BRPM | OXYGEN SATURATION: 99 % | SYSTOLIC BLOOD PRESSURE: 131 MMHG | DIASTOLIC BLOOD PRESSURE: 73 MMHG | TEMPERATURE: 96.6 F

## 2021-12-13 DIAGNOSIS — Z96.642 STATUS POST TOTAL REPLACEMENT OF LEFT HIP: ICD-10-CM

## 2021-12-13 DIAGNOSIS — M16.12 PRIMARY OSTEOARTHRITIS OF LEFT HIP: Primary | ICD-10-CM

## 2021-12-13 LAB
ABO/RH: NORMAL
ANTIBODY SCREEN: NORMAL
APTT: 49.1 SEC (ref 26.2–38.6)
GLUCOSE BLD-MCNC: 144 MG/DL (ref 70–99)
GLUCOSE BLD-MCNC: 176 MG/DL (ref 70–99)
GLUCOSE BLD-MCNC: 237 MG/DL (ref 70–99)
INR BLD: 1.32 (ref 0.88–1.12)
PERFORMED ON: ABNORMAL
PROTHROMBIN TIME: 15.1 SEC (ref 9.9–12.7)

## 2021-12-13 PROCEDURE — 6360000002 HC RX W HCPCS: Performed by: NURSE ANESTHETIST, CERTIFIED REGISTERED

## 2021-12-13 PROCEDURE — 3600000005 HC SURGERY LEVEL 5 BASE: Performed by: ORTHOPAEDIC SURGERY

## 2021-12-13 PROCEDURE — 6370000000 HC RX 637 (ALT 250 FOR IP): Performed by: ANESTHESIOLOGY

## 2021-12-13 PROCEDURE — 86901 BLOOD TYPING SEROLOGIC RH(D): CPT

## 2021-12-13 PROCEDURE — 7100000001 HC PACU RECOVERY - ADDTL 15 MIN: Performed by: ORTHOPAEDIC SURGERY

## 2021-12-13 PROCEDURE — 85730 THROMBOPLASTIN TIME PARTIAL: CPT

## 2021-12-13 PROCEDURE — C1776 JOINT DEVICE (IMPLANTABLE): HCPCS | Performed by: ORTHOPAEDIC SURGERY

## 2021-12-13 PROCEDURE — 6370000000 HC RX 637 (ALT 250 FOR IP): Performed by: INTERNAL MEDICINE

## 2021-12-13 PROCEDURE — 86850 RBC ANTIBODY SCREEN: CPT

## 2021-12-13 PROCEDURE — 88311 DECALCIFY TISSUE: CPT

## 2021-12-13 PROCEDURE — 2709999900 HC NON-CHARGEABLE SUPPLY: Performed by: ORTHOPAEDIC SURGERY

## 2021-12-13 PROCEDURE — 3E0T3BZ INTRODUCTION OF ANESTHETIC AGENT INTO PERIPHERAL NERVES AND PLEXI, PERCUTANEOUS APPROACH: ICD-10-PCS | Performed by: ORTHOPAEDIC SURGERY

## 2021-12-13 PROCEDURE — 7100000000 HC PACU RECOVERY - FIRST 15 MIN: Performed by: ORTHOPAEDIC SURGERY

## 2021-12-13 PROCEDURE — 6370000000 HC RX 637 (ALT 250 FOR IP): Performed by: PHYSICIAN ASSISTANT

## 2021-12-13 PROCEDURE — 2580000003 HC RX 258: Performed by: ANESTHESIOLOGY

## 2021-12-13 PROCEDURE — 27130 TOTAL HIP ARTHROPLASTY: CPT | Performed by: PHYSICIAN ASSISTANT

## 2021-12-13 PROCEDURE — 6360000002 HC RX W HCPCS: Performed by: PHYSICIAN ASSISTANT

## 2021-12-13 PROCEDURE — 2500000003 HC RX 250 WO HCPCS

## 2021-12-13 PROCEDURE — 2580000003 HC RX 258: Performed by: ORTHOPAEDIC SURGERY

## 2021-12-13 PROCEDURE — 85610 PROTHROMBIN TIME: CPT

## 2021-12-13 PROCEDURE — 86900 BLOOD TYPING SEROLOGIC ABO: CPT

## 2021-12-13 PROCEDURE — 1200000000 HC SEMI PRIVATE

## 2021-12-13 PROCEDURE — 3700000001 HC ADD 15 MINUTES (ANESTHESIA): Performed by: ORTHOPAEDIC SURGERY

## 2021-12-13 PROCEDURE — C9290 INJ, BUPIVACAINE LIPOSOME: HCPCS | Performed by: ORTHOPAEDIC SURGERY

## 2021-12-13 PROCEDURE — 27130 TOTAL HIP ARTHROPLASTY: CPT | Performed by: ORTHOPAEDIC SURGERY

## 2021-12-13 PROCEDURE — 0SRB0JZ REPLACEMENT OF LEFT HIP JOINT WITH SYNTHETIC SUBSTITUTE, OPEN APPROACH: ICD-10-PCS | Performed by: ORTHOPAEDIC SURGERY

## 2021-12-13 PROCEDURE — 3700000000 HC ANESTHESIA ATTENDED CARE: Performed by: ORTHOPAEDIC SURGERY

## 2021-12-13 PROCEDURE — 73501 X-RAY EXAM HIP UNI 1 VIEW: CPT

## 2021-12-13 PROCEDURE — 2500000003 HC RX 250 WO HCPCS: Performed by: NURSE ANESTHETIST, CERTIFIED REGISTERED

## 2021-12-13 PROCEDURE — 76942 ECHO GUIDE FOR BIOPSY: CPT | Performed by: ANESTHESIOLOGY

## 2021-12-13 PROCEDURE — 2720000010 HC SURG SUPPLY STERILE: Performed by: ORTHOPAEDIC SURGERY

## 2021-12-13 PROCEDURE — 3600000015 HC SURGERY LEVEL 5 ADDTL 15MIN: Performed by: ORTHOPAEDIC SURGERY

## 2021-12-13 PROCEDURE — 88304 TISSUE EXAM BY PATHOLOGIST: CPT

## 2021-12-13 PROCEDURE — 73502 X-RAY EXAM HIP UNI 2-3 VIEWS: CPT

## 2021-12-13 PROCEDURE — 2500000003 HC RX 250 WO HCPCS: Performed by: ORTHOPAEDIC SURGERY

## 2021-12-13 PROCEDURE — 3209999900 FLUORO FOR SURGICAL PROCEDURES

## 2021-12-13 PROCEDURE — 6360000002 HC RX W HCPCS: Performed by: ORTHOPAEDIC SURGERY

## 2021-12-13 PROCEDURE — 2580000003 HC RX 258: Performed by: PHYSICIAN ASSISTANT

## 2021-12-13 DEVICE — G7 OSSEOTI 4 HOLE SHELL 58MM G: Type: IMPLANTABLE DEVICE | Site: HIP | Status: FUNCTIONAL

## 2021-12-13 DEVICE — LINER ACET NEUT G 40 MM VIVACIT-E G7: Type: IMPLANTABLE DEVICE | Site: HIP | Status: FUNCTIONAL

## 2021-12-13 DEVICE — IMPLANTABLE DEVICE: Type: IMPLANTABLE DEVICE | Site: HIP | Status: FUNCTIONAL

## 2021-12-13 DEVICE — SLEEVE FEM +3MM OFFSET HIP TYP 1 TAPR OPT HD ACT ARTC 2: Type: IMPLANTABLE DEVICE | Site: HIP | Status: FUNCTIONAL

## 2021-12-13 DEVICE — HEAD FEM DIA40MM HIP BIOLOX DELT OPT FOR G7 ACET SYS: Type: IMPLANTABLE DEVICE | Site: HIP | Status: FUNCTIONAL

## 2021-12-13 DEVICE — UPCHARGE HIP VITAMIN E LINER ZIMMER BIOMET: Type: IMPLANTABLE DEVICE | Site: HIP | Status: FUNCTIONAL

## 2021-12-13 RX ORDER — SODIUM CHLORIDE, SODIUM LACTATE, POTASSIUM CHLORIDE, CALCIUM CHLORIDE 600; 310; 30; 20 MG/100ML; MG/100ML; MG/100ML; MG/100ML
INJECTION, SOLUTION INTRAVENOUS CONTINUOUS
Status: DISCONTINUED | OUTPATIENT
Start: 2021-12-13 | End: 2021-12-14 | Stop reason: HOSPADM

## 2021-12-13 RX ORDER — LIDOCAINE HYDROCHLORIDE 10 MG/ML
1 INJECTION, SOLUTION EPIDURAL; INFILTRATION; INTRACAUDAL; PERINEURAL
Status: DISCONTINUED | OUTPATIENT
Start: 2021-12-13 | End: 2021-12-13 | Stop reason: HOSPADM

## 2021-12-13 RX ORDER — DEXAMETHASONE SODIUM PHOSPHATE 4 MG/ML
INJECTION, SOLUTION INTRA-ARTICULAR; INTRALESIONAL; INTRAMUSCULAR; INTRAVENOUS; SOFT TISSUE PRN
Status: DISCONTINUED | OUTPATIENT
Start: 2021-12-13 | End: 2021-12-13 | Stop reason: SDUPTHER

## 2021-12-13 RX ORDER — GLYCOPYRROLATE 0.2 MG/ML
INJECTION INTRAMUSCULAR; INTRAVENOUS PRN
Status: DISCONTINUED | OUTPATIENT
Start: 2021-12-13 | End: 2021-12-13 | Stop reason: SDUPTHER

## 2021-12-13 RX ORDER — OXYCODONE HYDROCHLORIDE AND ACETAMINOPHEN 5; 325 MG/1; MG/1
1 TABLET ORAL PRN
Status: COMPLETED | OUTPATIENT
Start: 2021-12-13 | End: 2021-12-13

## 2021-12-13 RX ORDER — ONDANSETRON 4 MG/1
4 TABLET, ORALLY DISINTEGRATING ORAL EVERY 8 HOURS PRN
Status: DISCONTINUED | OUTPATIENT
Start: 2021-12-13 | End: 2021-12-14 | Stop reason: HOSPADM

## 2021-12-13 RX ORDER — LIDOCAINE HYDROCHLORIDE 20 MG/ML
INJECTION, SOLUTION INFILTRATION; PERINEURAL PRN
Status: DISCONTINUED | OUTPATIENT
Start: 2021-12-13 | End: 2021-12-13 | Stop reason: SDUPTHER

## 2021-12-13 RX ORDER — PREGABALIN 75 MG/1
75 CAPSULE ORAL ONCE
Status: COMPLETED | OUTPATIENT
Start: 2021-12-13 | End: 2021-12-13

## 2021-12-13 RX ORDER — SODIUM CHLORIDE 0.9 % (FLUSH) 0.9 %
10 SYRINGE (ML) INJECTION PRN
Status: DISCONTINUED | OUTPATIENT
Start: 2021-12-13 | End: 2021-12-14 | Stop reason: HOSPADM

## 2021-12-13 RX ORDER — LISINOPRIL 10 MG/1
10 TABLET ORAL DAILY
Status: DISCONTINUED | OUTPATIENT
Start: 2021-12-13 | End: 2021-12-14 | Stop reason: HOSPADM

## 2021-12-13 RX ORDER — ATORVASTATIN CALCIUM 40 MG/1
40 TABLET, FILM COATED ORAL DAILY
Status: DISCONTINUED | OUTPATIENT
Start: 2021-12-13 | End: 2021-12-14 | Stop reason: HOSPADM

## 2021-12-13 RX ORDER — MEPERIDINE HYDROCHLORIDE 50 MG/ML
12.5 INJECTION INTRAMUSCULAR; INTRAVENOUS; SUBCUTANEOUS EVERY 5 MIN PRN
Status: DISCONTINUED | OUTPATIENT
Start: 2021-12-13 | End: 2021-12-13 | Stop reason: HOSPADM

## 2021-12-13 RX ORDER — M-VIT,TX,IRON,MINS/CALC/FOLIC 27MG-0.4MG
1 TABLET ORAL DAILY
Status: DISCONTINUED | OUTPATIENT
Start: 2021-12-13 | End: 2021-12-14 | Stop reason: HOSPADM

## 2021-12-13 RX ORDER — ATENOLOL 25 MG/1
100 TABLET ORAL DAILY
Status: DISCONTINUED | OUTPATIENT
Start: 2021-12-13 | End: 2021-12-14 | Stop reason: HOSPADM

## 2021-12-13 RX ORDER — EPHEDRINE SULFATE 50 MG/ML
INJECTION INTRAVENOUS PRN
Status: DISCONTINUED | OUTPATIENT
Start: 2021-12-13 | End: 2021-12-13 | Stop reason: SDUPTHER

## 2021-12-13 RX ORDER — ATENOLOL AND CHLORTHALIDONE TABLET 100; 25 MG/1; MG/1
1 TABLET ORAL DAILY
Status: DISCONTINUED | OUTPATIENT
Start: 2021-12-13 | End: 2021-12-13 | Stop reason: CLARIF

## 2021-12-13 RX ORDER — MORPHINE SULFATE 4 MG/ML
4 INJECTION, SOLUTION INTRAMUSCULAR; INTRAVENOUS
Status: DISCONTINUED | OUTPATIENT
Start: 2021-12-13 | End: 2021-12-14 | Stop reason: HOSPADM

## 2021-12-13 RX ORDER — POLYETHYLENE GLYCOL 3350 17 G/17G
17 POWDER, FOR SOLUTION ORAL DAILY
Status: DISCONTINUED | OUTPATIENT
Start: 2021-12-13 | End: 2021-12-14 | Stop reason: HOSPADM

## 2021-12-13 RX ORDER — SODIUM CHLORIDE 0.9 % (FLUSH) 0.9 %
10 SYRINGE (ML) INJECTION EVERY 12 HOURS SCHEDULED
Status: DISCONTINUED | OUTPATIENT
Start: 2021-12-13 | End: 2021-12-14 | Stop reason: HOSPADM

## 2021-12-13 RX ORDER — NICOTINE POLACRILEX 4 MG
15 LOZENGE BUCCAL PRN
Status: DISCONTINUED | OUTPATIENT
Start: 2021-12-13 | End: 2021-12-14 | Stop reason: HOSPADM

## 2021-12-13 RX ORDER — DEXTROSE MONOHYDRATE 50 MG/ML
100 INJECTION, SOLUTION INTRAVENOUS PRN
Status: DISCONTINUED | OUTPATIENT
Start: 2021-12-13 | End: 2021-12-14 | Stop reason: HOSPADM

## 2021-12-13 RX ORDER — PROPOFOL 10 MG/ML
INJECTION, EMULSION INTRAVENOUS PRN
Status: DISCONTINUED | OUTPATIENT
Start: 2021-12-13 | End: 2021-12-13 | Stop reason: SDUPTHER

## 2021-12-13 RX ORDER — ONDANSETRON 2 MG/ML
4 INJECTION INTRAMUSCULAR; INTRAVENOUS EVERY 6 HOURS PRN
Status: DISCONTINUED | OUTPATIENT
Start: 2021-12-13 | End: 2021-12-14 | Stop reason: HOSPADM

## 2021-12-13 RX ORDER — DEXTROSE MONOHYDRATE 25 G/50ML
12.5 INJECTION, SOLUTION INTRAVENOUS PRN
Status: DISCONTINUED | OUTPATIENT
Start: 2021-12-13 | End: 2021-12-14 | Stop reason: HOSPADM

## 2021-12-13 RX ORDER — OXYCODONE HYDROCHLORIDE 5 MG/1
10 TABLET ORAL EVERY 4 HOURS PRN
Status: DISCONTINUED | OUTPATIENT
Start: 2021-12-13 | End: 2021-12-14 | Stop reason: HOSPADM

## 2021-12-13 RX ORDER — HYDRALAZINE HYDROCHLORIDE 20 MG/ML
5 INJECTION INTRAMUSCULAR; INTRAVENOUS EVERY 30 MIN PRN
Status: DISCONTINUED | OUTPATIENT
Start: 2021-12-13 | End: 2021-12-13 | Stop reason: HOSPADM

## 2021-12-13 RX ORDER — SODIUM CHLORIDE 9 MG/ML
25 INJECTION, SOLUTION INTRAVENOUS PRN
Status: DISCONTINUED | OUTPATIENT
Start: 2021-12-13 | End: 2021-12-14 | Stop reason: HOSPADM

## 2021-12-13 RX ORDER — OXYCODONE HYDROCHLORIDE 5 MG/1
5 TABLET ORAL EVERY 4 HOURS PRN
Status: DISCONTINUED | OUTPATIENT
Start: 2021-12-13 | End: 2021-12-14 | Stop reason: HOSPADM

## 2021-12-13 RX ORDER — SODIUM CHLORIDE, SODIUM LACTATE, POTASSIUM CHLORIDE, CALCIUM CHLORIDE 600; 310; 30; 20 MG/100ML; MG/100ML; MG/100ML; MG/100ML
INJECTION, SOLUTION INTRAVENOUS CONTINUOUS
Status: DISCONTINUED | OUTPATIENT
Start: 2021-12-13 | End: 2021-12-13

## 2021-12-13 RX ORDER — MAGNESIUM HYDROXIDE 1200 MG/15ML
LIQUID ORAL CONTINUOUS PRN
Status: COMPLETED | OUTPATIENT
Start: 2021-12-13 | End: 2021-12-13

## 2021-12-13 RX ORDER — SODIUM CHLORIDE 0.9 % (FLUSH) 0.9 %
10 SYRINGE (ML) INJECTION EVERY 12 HOURS SCHEDULED
Status: DISCONTINUED | OUTPATIENT
Start: 2021-12-13 | End: 2021-12-13 | Stop reason: HOSPADM

## 2021-12-13 RX ORDER — SODIUM CHLORIDE 9 MG/ML
25 INJECTION, SOLUTION INTRAVENOUS PRN
Status: DISCONTINUED | OUTPATIENT
Start: 2021-12-13 | End: 2021-12-13 | Stop reason: HOSPADM

## 2021-12-13 RX ORDER — PHENYLEPHRINE HCL IN 0.9% NACL 1 MG/10 ML
SYRINGE (ML) INTRAVENOUS PRN
Status: DISCONTINUED | OUTPATIENT
Start: 2021-12-13 | End: 2021-12-13 | Stop reason: SDUPTHER

## 2021-12-13 RX ORDER — DIPHENHYDRAMINE HYDROCHLORIDE 50 MG/ML
6.25 INJECTION INTRAMUSCULAR; INTRAVENOUS
Status: DISCONTINUED | OUTPATIENT
Start: 2021-12-13 | End: 2021-12-13 | Stop reason: HOSPADM

## 2021-12-13 RX ORDER — CHLORTHALIDONE 25 MG/1
25 TABLET ORAL DAILY
Status: DISCONTINUED | OUTPATIENT
Start: 2021-12-13 | End: 2021-12-14 | Stop reason: HOSPADM

## 2021-12-13 RX ORDER — ONDANSETRON 2 MG/ML
INJECTION INTRAMUSCULAR; INTRAVENOUS PRN
Status: DISCONTINUED | OUTPATIENT
Start: 2021-12-13 | End: 2021-12-13 | Stop reason: SDUPTHER

## 2021-12-13 RX ORDER — ACETAMINOPHEN 325 MG/1
650 TABLET ORAL EVERY 6 HOURS
Status: DISCONTINUED | OUTPATIENT
Start: 2021-12-13 | End: 2021-12-14 | Stop reason: HOSPADM

## 2021-12-13 RX ORDER — LABETALOL HYDROCHLORIDE 5 MG/ML
5 INJECTION, SOLUTION INTRAVENOUS
Status: DISCONTINUED | OUTPATIENT
Start: 2021-12-13 | End: 2021-12-13 | Stop reason: HOSPADM

## 2021-12-13 RX ORDER — MIDAZOLAM HYDROCHLORIDE 1 MG/ML
INJECTION INTRAMUSCULAR; INTRAVENOUS PRN
Status: DISCONTINUED | OUTPATIENT
Start: 2021-12-13 | End: 2021-12-13 | Stop reason: SDUPTHER

## 2021-12-13 RX ORDER — MORPHINE SULFATE 2 MG/ML
2 INJECTION, SOLUTION INTRAMUSCULAR; INTRAVENOUS
Status: DISCONTINUED | OUTPATIENT
Start: 2021-12-13 | End: 2021-12-14 | Stop reason: HOSPADM

## 2021-12-13 RX ORDER — FENTANYL CITRATE 50 UG/ML
INJECTION, SOLUTION INTRAMUSCULAR; INTRAVENOUS PRN
Status: DISCONTINUED | OUTPATIENT
Start: 2021-12-13 | End: 2021-12-13 | Stop reason: SDUPTHER

## 2021-12-13 RX ORDER — ROCURONIUM BROMIDE 10 MG/ML
INJECTION, SOLUTION INTRAVENOUS PRN
Status: DISCONTINUED | OUTPATIENT
Start: 2021-12-13 | End: 2021-12-13 | Stop reason: SDUPTHER

## 2021-12-13 RX ORDER — VANCOMYCIN HYDROCHLORIDE 1 G/20ML
INJECTION, POWDER, LYOPHILIZED, FOR SOLUTION INTRAVENOUS PRN
Status: DISCONTINUED | OUTPATIENT
Start: 2021-12-13 | End: 2021-12-13 | Stop reason: HOSPADM

## 2021-12-13 RX ORDER — SODIUM CHLORIDE 0.9 % (FLUSH) 0.9 %
10 SYRINGE (ML) INJECTION PRN
Status: DISCONTINUED | OUTPATIENT
Start: 2021-12-13 | End: 2021-12-13 | Stop reason: HOSPADM

## 2021-12-13 RX ORDER — ONDANSETRON 2 MG/ML
4 INJECTION INTRAMUSCULAR; INTRAVENOUS EVERY 30 MIN PRN
Status: DISCONTINUED | OUTPATIENT
Start: 2021-12-13 | End: 2021-12-13 | Stop reason: HOSPADM

## 2021-12-13 RX ORDER — OXYCODONE HYDROCHLORIDE AND ACETAMINOPHEN 5; 325 MG/1; MG/1
2 TABLET ORAL PRN
Status: COMPLETED | OUTPATIENT
Start: 2021-12-13 | End: 2021-12-13

## 2021-12-13 RX ADMIN — Medication 50 MCG: at 11:21

## 2021-12-13 RX ADMIN — Medication 50 MCG: at 12:10

## 2021-12-13 RX ADMIN — LIDOCAINE HYDROCHLORIDE 5 ML: 20 INJECTION, SOLUTION INFILTRATION; PERINEURAL at 10:38

## 2021-12-13 RX ADMIN — DEXTROSE MONOHYDRATE 2 G: 50 INJECTION, SOLUTION INTRAVENOUS at 10:30

## 2021-12-13 RX ADMIN — LISINOPRIL 10 MG: 10 TABLET ORAL at 17:52

## 2021-12-13 RX ADMIN — OXYCODONE AND ACETAMINOPHEN 1 TABLET: 5; 325 TABLET ORAL at 14:13

## 2021-12-13 RX ADMIN — FENTANYL CITRATE 50 MCG: 50 INJECTION INTRAMUSCULAR; INTRAVENOUS at 11:09

## 2021-12-13 RX ADMIN — CHLORTHALIDONE 25 MG: 25 TABLET ORAL at 17:52

## 2021-12-13 RX ADMIN — EPHEDRINE SULFATE 10 MG: 50 INJECTION INTRAVENOUS at 12:10

## 2021-12-13 RX ADMIN — FENTANYL CITRATE 50 MCG: 50 INJECTION INTRAMUSCULAR; INTRAVENOUS at 12:24

## 2021-12-13 RX ADMIN — Medication 100 MCG: at 12:59

## 2021-12-13 RX ADMIN — SODIUM CHLORIDE, SODIUM LACTATE, POTASSIUM CHLORIDE, AND CALCIUM CHLORIDE: .6; .31; .03; .02 INJECTION, SOLUTION INTRAVENOUS at 10:07

## 2021-12-13 RX ADMIN — EPHEDRINE SULFATE 5 MG: 50 INJECTION INTRAVENOUS at 11:45

## 2021-12-13 RX ADMIN — ONDANSETRON 4 MG: 2 INJECTION INTRAMUSCULAR; INTRAVENOUS at 10:51

## 2021-12-13 RX ADMIN — POLYETHYLENE GLYCOL 3350 17 G: 17 POWDER, FOR SOLUTION ORAL at 17:52

## 2021-12-13 RX ADMIN — Medication 100 MCG: at 11:03

## 2021-12-13 RX ADMIN — FENTANYL CITRATE 50 MCG: 50 INJECTION INTRAMUSCULAR; INTRAVENOUS at 10:34

## 2021-12-13 RX ADMIN — EPHEDRINE SULFATE 10 MG: 50 INJECTION INTRAVENOUS at 12:34

## 2021-12-13 RX ADMIN — GLYCOPYRROLATE 0.2 MG: 0.2 INJECTION, SOLUTION INTRAMUSCULAR; INTRAVENOUS at 10:30

## 2021-12-13 RX ADMIN — ATORVASTATIN CALCIUM 40 MG: 40 TABLET, FILM COATED ORAL at 22:42

## 2021-12-13 RX ADMIN — FENTANYL CITRATE 25 MCG: 50 INJECTION INTRAMUSCULAR; INTRAVENOUS at 12:54

## 2021-12-13 RX ADMIN — Medication 1 TABLET: at 17:52

## 2021-12-13 RX ADMIN — Medication 50 MCG: at 11:09

## 2021-12-13 RX ADMIN — DEXAMETHASONE SODIUM PHOSPHATE 10 MG: 4 INJECTION, SOLUTION INTRAMUSCULAR; INTRAVENOUS at 10:51

## 2021-12-13 RX ADMIN — ACETAMINOPHEN 650 MG: 325 TABLET ORAL at 17:52

## 2021-12-13 RX ADMIN — FENTANYL CITRATE 25 MCG: 50 INJECTION INTRAMUSCULAR; INTRAVENOUS at 12:34

## 2021-12-13 RX ADMIN — SODIUM CHLORIDE, SODIUM LACTATE, POTASSIUM CHLORIDE, AND CALCIUM CHLORIDE: .6; .31; .03; .02 INJECTION, SOLUTION INTRAVENOUS at 11:47

## 2021-12-13 RX ADMIN — SODIUM CHLORIDE, POTASSIUM CHLORIDE, SODIUM LACTATE AND CALCIUM CHLORIDE: 600; 310; 30; 20 INJECTION, SOLUTION INTRAVENOUS at 17:51

## 2021-12-13 RX ADMIN — CEFAZOLIN 2000 MG: 10 INJECTION, POWDER, FOR SOLUTION INTRAVENOUS at 22:53

## 2021-12-13 RX ADMIN — PREGABALIN 75 MG: 75 CAPSULE ORAL at 09:43

## 2021-12-13 RX ADMIN — ROCURONIUM BROMIDE 10 MG: 10 SOLUTION INTRAVENOUS at 12:10

## 2021-12-13 RX ADMIN — ROCURONIUM BROMIDE 50 MG: 10 SOLUTION INTRAVENOUS at 10:38

## 2021-12-13 RX ADMIN — MIDAZOLAM HYDROCHLORIDE 1 MG: 2 INJECTION, SOLUTION INTRAMUSCULAR; INTRAVENOUS at 10:28

## 2021-12-13 RX ADMIN — Medication 10 ML: at 22:42

## 2021-12-13 RX ADMIN — PROPOFOL 100 MG: 10 INJECTION, EMULSION INTRAVENOUS at 10:38

## 2021-12-13 RX ADMIN — ACETAMINOPHEN 650 MG: 325 TABLET ORAL at 22:42

## 2021-12-13 RX ADMIN — EPHEDRINE SULFATE 5 MG: 50 INJECTION INTRAVENOUS at 11:36

## 2021-12-13 ASSESSMENT — PULMONARY FUNCTION TESTS
PIF_VALUE: 20
PIF_VALUE: 11
PIF_VALUE: 23
PIF_VALUE: 22
PIF_VALUE: 21
PIF_VALUE: 9
PIF_VALUE: 10
PIF_VALUE: 22
PIF_VALUE: 20
PIF_VALUE: 20
PIF_VALUE: 21
PIF_VALUE: 21
PIF_VALUE: 22
PIF_VALUE: 21
PIF_VALUE: 1
PIF_VALUE: 28
PIF_VALUE: 19
PIF_VALUE: 10
PIF_VALUE: 21
PIF_VALUE: 10
PIF_VALUE: 19
PIF_VALUE: 9
PIF_VALUE: 23
PIF_VALUE: 22
PIF_VALUE: 23
PIF_VALUE: 16
PIF_VALUE: 21
PIF_VALUE: 23
PIF_VALUE: 22
PIF_VALUE: 21
PIF_VALUE: 23
PIF_VALUE: 22
PIF_VALUE: 2
PIF_VALUE: 41
PIF_VALUE: 10
PIF_VALUE: 23
PIF_VALUE: 21
PIF_VALUE: 24
PIF_VALUE: 10
PIF_VALUE: 22
PIF_VALUE: 23
PIF_VALUE: 20
PIF_VALUE: 18
PIF_VALUE: 21
PIF_VALUE: 23
PIF_VALUE: 23
PIF_VALUE: 24
PIF_VALUE: 22
PIF_VALUE: 1
PIF_VALUE: 9
PIF_VALUE: 0
PIF_VALUE: 20
PIF_VALUE: 20
PIF_VALUE: 23
PIF_VALUE: 22
PIF_VALUE: 4
PIF_VALUE: 10
PIF_VALUE: 9
PIF_VALUE: 17
PIF_VALUE: 0
PIF_VALUE: 10
PIF_VALUE: 10
PIF_VALUE: 19
PIF_VALUE: 22
PIF_VALUE: 24
PIF_VALUE: 23
PIF_VALUE: 19
PIF_VALUE: 18
PIF_VALUE: 24
PIF_VALUE: 23
PIF_VALUE: 10
PIF_VALUE: 2
PIF_VALUE: 23
PIF_VALUE: 10
PIF_VALUE: 10
PIF_VALUE: 21
PIF_VALUE: 21
PIF_VALUE: 0
PIF_VALUE: 23
PIF_VALUE: 22
PIF_VALUE: 21
PIF_VALUE: 22
PIF_VALUE: 23
PIF_VALUE: 24
PIF_VALUE: 23
PIF_VALUE: 21
PIF_VALUE: 21
PIF_VALUE: 22
PIF_VALUE: 10
PIF_VALUE: 18
PIF_VALUE: 10
PIF_VALUE: 23
PIF_VALUE: 23
PIF_VALUE: 21
PIF_VALUE: 20
PIF_VALUE: 22
PIF_VALUE: 22
PIF_VALUE: 23
PIF_VALUE: 21
PIF_VALUE: 1
PIF_VALUE: 21
PIF_VALUE: 1
PIF_VALUE: 23
PIF_VALUE: 9
PIF_VALUE: 16
PIF_VALUE: 9
PIF_VALUE: 22
PIF_VALUE: 10
PIF_VALUE: 1
PIF_VALUE: 25
PIF_VALUE: 23
PIF_VALUE: 21
PIF_VALUE: 11
PIF_VALUE: 23
PIF_VALUE: 22
PIF_VALUE: 18
PIF_VALUE: 12
PIF_VALUE: 22
PIF_VALUE: 22
PIF_VALUE: 2
PIF_VALUE: 23
PIF_VALUE: 22
PIF_VALUE: 10
PIF_VALUE: 23
PIF_VALUE: 10
PIF_VALUE: 17
PIF_VALUE: 22
PIF_VALUE: 20
PIF_VALUE: 10
PIF_VALUE: 10
PIF_VALUE: 9
PIF_VALUE: 20
PIF_VALUE: 9
PIF_VALUE: 10
PIF_VALUE: 21
PIF_VALUE: 10
PIF_VALUE: 10
PIF_VALUE: 0
PIF_VALUE: 20
PIF_VALUE: 22
PIF_VALUE: 10
PIF_VALUE: 10
PIF_VALUE: 27
PIF_VALUE: 10
PIF_VALUE: 22
PIF_VALUE: 10
PIF_VALUE: 10
PIF_VALUE: 20
PIF_VALUE: 22
PIF_VALUE: 23
PIF_VALUE: 21
PIF_VALUE: 22
PIF_VALUE: 10
PIF_VALUE: 22

## 2021-12-13 ASSESSMENT — PAIN DESCRIPTION - PAIN TYPE
TYPE: SURGICAL PAIN
TYPE: SURGICAL PAIN

## 2021-12-13 ASSESSMENT — PAIN DESCRIPTION - ORIENTATION
ORIENTATION: LEFT
ORIENTATION: LEFT

## 2021-12-13 ASSESSMENT — PAIN DESCRIPTION - LOCATION
LOCATION: HIP
LOCATION: HIP

## 2021-12-13 ASSESSMENT — PAIN - FUNCTIONAL ASSESSMENT
PAIN_FUNCTIONAL_ASSESSMENT: 0-10
PAIN_FUNCTIONAL_ASSESSMENT: PREVENTS OR INTERFERES SOME ACTIVE ACTIVITIES AND ADLS

## 2021-12-13 ASSESSMENT — PAIN SCALES - WONG BAKER
WONGBAKER_NUMERICALRESPONSE: 0

## 2021-12-13 ASSESSMENT — PAIN SCALES - GENERAL
PAINLEVEL_OUTOF10: 3
PAINLEVEL_OUTOF10: 0
PAINLEVEL_OUTOF10: 0
PAINLEVEL_OUTOF10: 6
PAINLEVEL_OUTOF10: 4
PAINLEVEL_OUTOF10: 0

## 2021-12-13 ASSESSMENT — ENCOUNTER SYMPTOMS: SHORTNESS OF BREATH: 1

## 2021-12-13 NOTE — PROGRESS NOTES
Patient admitted to room 538 from ED. Patient oriented to room, call light, bed rails, phone, lights and bathroom. Patient instructed about the schedule of the day including: vital sign frequency, lab draws, possible tests, frequency of MD and staff rounds, including RN/MD rounding together at bedside, daily weights, and I &O's. Patient instructed about prescribed diet, how to use 8MENU, and television. bed alarm in place, patient aware of placement and reason. Bed locked, in lowest position, side rails up 2/4, call light within reach. Will continue to monitor.

## 2021-12-13 NOTE — H&P
Update History & Physical     The patient's History and Physical of 11/29/2021 was reviewed with the patient and I examined the patient. There was no change. The surgical site was confirmed by the patient and me. Plan: The risks, benefits, expected outcome, and alternative to the recommended procedure have been discussed with the patient / family. Patient understands and wants to proceed with the procedure.       Electronically signed by Balaji Brandt MD on 12/13/2021 at 9:17 AM

## 2021-12-13 NOTE — PROGRESS NOTES
Pt has been given instructions and verbalizes understanding of the following: Dressing and incision care. Post op medications including when to take and possible side effects of the following medications: Anticoagulation, pain medication, and stool softeners. How to relieve pain and swelling through non- pharmacological comfort measures. When to call the office for questions or concerns, when to follow up with surgeon, and instructions on use of the incentive spirometer. Pt is being admitted to an inpatient room on C5 and will work with physical therapy and will receive a list of exercises with discussed precautions. Pt has been educated on use of ambulation aides, bed mobility, fall safety, bathing safety, and when to start physical therapy. Pt has been educated on signs and symptoms of infection, inadequate pain control, and when and who to call for changes in condition.

## 2021-12-13 NOTE — CARE COORDINATION
[facility]    Referral received from CM to follow for home care services.    [facility] out of network    Will find alternate agency; pt has no preference    Terri Pepper RN, BSN CTN  [facility] [phone]

## 2021-12-13 NOTE — ANESTHESIA PRE PROCEDURE
Department of Anesthesiology  Preprocedure Note       Name:  Tracey Hair   Age:  [de-identified] y.o.  :  1940                                          MRN:  [id number]         Date:  2021      Surgeon: Terrance Valle): Jayant Darby MD    Procedure: Procedure(s):  LEFT TOTAL HIP ARTHROPLASTY MINIMALLY INVASIVE DIRECT ANTERIOR WITH FASCIAILIACA BLOCK FOR PAIN CONTROL                        SAVI BIOMET    Medications prior to admission:   Prior to Admission medications    Medication Sig Start Date End Date Taking? Authorizing Provider   hydrocortisone 2.5 % cream Apply topically 2 times daily.  21   Camilla Rios DO   mupirocin (BACTROBAN) 2 % ointment Apply twice daily for 5 days to each nare, begin 2021  Jayant Darby MD   lisinopril (PRINIVIL;ZESTRIL) 20 MG tablet TAKE 1/2 TABLET (10 mg) BY MOUTH ONE TIME A DAY 21   García Ledesma MD   apixaban [facility]) 5 MG TABS tablet Take 1 tablet by mouth 2 times daily 21   García Ledesma MD   metFORMIN (GLUCOPHAGE) 500 MG tablet Take 1 tablet by mouth daily (with breakfast) 21   Camilla Rios DO   atorvastatin (LIPITOR) 40 MG tablet TAKE 1 TABLET BY MOUTH ONE TIME A DAY  21   Camilla Rios DO   atenolol-chlorthalidone (TENORETIC) 100-25 MG per tablet Take 1 tablet by mouth daily  Patient taking differently: Take 1 tablet by mouth daily Takes 1/2 tablet daily 20   Camilla Rios DO   Multiple Vitamins-Minerals (THERAPEUTIC MULTIVITAMIN-MINERALS) tablet Take 1 tablet by mouth daily     Historical Provider, MD       Current medications:    Current Facility-Administered Medications   Medication Dose Route Frequency Provider Last Rate Last Admin    pregabalin (LYRICA) capsule 75 mg  75 mg Oral Once Natanael Patel PA-C        tranexamic acid (CYKLOKAPRON) 1,000 mg in dextrose 5 % 100 mL IVPB  1,000 mg IntraVENous On Call to [address] [employer]        ceFAZolin (ANCEF) 2000 mg in dextrose 5 % 100 mL IVPB  2,000 mg IntraVENous On Call to [address] [employer] [facility]        lactated ringers infusion   IntraVENous Continuous Nick Duffy MD        sodium chloride flush 0.9 % injection 10 mL  10 mL IntraVENous 2 times per day Nick Duffy MD        sodium chloride flush 0.9 % injection 10 mL  10 mL IntraVENous PRN Nick Duffy MD        0.9 % sodium chloride infusion  25 mL IntraVENous PRN Nick Duffy MD        lidocaine PF 1 % injection 1 mL  1 mL IntraDERmal Once PRN Nick Duffy MD        meperidine (DEMEROL) injection 12.5 mg  12.5 mg IntraVENous Q5 Min PRN Clinton Chapa MD        HYDROmorphone (DILAUDID) injection 0.5 mg  0.5 mg IntraVENous Q10 Min PRN Clinton Chapa MD        HYDROmorphone (DILAUDID) injection 0.5 mg  0.5 mg IntraVENous Q5 Min PRN Clinton Chapa MD        oxyCODONE-acetaminophen (PERCOCET) 5-325 MG per tablet 1 tablet  1 tablet Oral PRN Clinton Chapa MD        Or    oxyCODONE-acetaminophen (PERCOCET) 5-325 MG per tablet 2 tablet  2 tablet Oral PRN Clinton Chapa MD        ondansetron [facility]) injection 4 mg  4 mg IntraVENous Q30 Min PRN Clinton Chapa MD        diphenhydrAMINE (BENADRYL) injection 6.25 mg  6.25 mg IntraVENous Once PRN Clinton Chapa MD        labetalol (NORMODYNE;TRANDATE) injection 5 mg  5 mg IntraVENous Q15 Min PRN Clinton Chapa MD        hydrALAZINE (APRESOLINE) injection 5 mg  5 mg IntraVENous Q30 Min PRN Clinton Chapa MD           Allergies: Allergies   Allergen Reactions    Bactrim [Sulfamethoxazole-Trimethoprim] Other (See Comments)     Drug rash    Pcn [Penicillins] Hives       Problem List:    Patient Active Problem List   Diagnosis Code    Essential hypertension I10    Mixed hyperlipidemia E78.2    Morbidly obese ([facility] E66.01    Erectile dysfunction N52.9    Pre-diabetes R73.03    Chronic gout without tophus M1A. 9XX0    Nonrheumatic aortic valve stenosis I35.0    Abnormal EKG R94.31    SOB (shortness of breath) R06.02    Atrial fibrillation (HCC) I48.91    Hypercholesteremia E78.00    Localized osteoarthrosis of left hip M16.12       Past Medical History:        Diagnosis Date    Arthritis     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    Diabetes mellitus ([facility]     ED (erectile dysfunction)     Erectile dysfunction 2/2/2016    Hyperlipidemia     Hypertension     Nocturia     Pre-diabetes     Prostate CA ([address]     seeds implanted approx 2004       Past Surgical History:        Procedure Laterality Date    CATARACT REMOVAL Bilateral 03/2017    COLONOSCOPY  9/24/14    multiple polyp    JOINT REPLACEMENT Left 9/1/2015    left total knee replacement    KNEE ARTHROSCOPY      left knee    KNEE SURGERY Left 1963    PROSTATE SURGERY      TONSILLECTOMY         Social History:    Social History     Tobacco Use    Smoking status: Never Smoker    Smokeless tobacco: Never Used   Substance Use Topics    Alcohol use: Yes     Comment: occ                                Counseling given: Not Answered      Vital Signs (Current):   Vitals:    12/10/21 1159   Weight: 225 lb (102.1 kg)   Height: 5' 9\" (1.753 m)                                              BP Readings from Last 3 Encounters:   11/26/21 (!) 121/51   11/22/21 136/64   11/18/21 138/60       NPO Status:                                                                                 BMI:   Wt Readings from Last 3 Encounters:   12/10/21 225 lb (102.1 kg)   11/26/21 226 lb (102.5 kg)   11/22/21 226 lb (102.5 kg)     Body mass index is 33.23 kg/m².     CBC:   Lab Results   Component Value Date    WBC 6.2 11/18/2021    RBC 4.78 11/18/2021    HGB 13.0 11/18/2021    HCT 39.4 11/18/2021    MCV 82.4 11/18/2021    RDW 16.5 11/18/2021     11/18/2021       CMP:   Lab Results   Component Value Date     10/04/2021    K 3.9 10/04/2021    CL 98 10/04/2021    CO2 27 10/04/2021    BUN 11 10/04/2021    CREATININE 0.5 10/04/2021    GFRAA >60 10/04/2021    AGRATIO 1.4 03/11/2021    LABGLOM >60 10/04/2021    GLUCOSE 119 10/04/2021    PROT 7.3 03/11/2021    CALCIUM 9.5 10/04/2021    BILITOT 2.0 03/11/2021    ALKPHOS 145 03/11/2021    AST 24 03/11/2021    ALT 21 03/11/2021       POC Tests: No results for input(s): POCGLU, POCNA, POCK, POCCL, POCBUN, POCHEMO, POCHCT in the last 72 hours. Coags:   Lab Results   Component Value Date    PROTIME 18.4 10/04/2021    INR 1.60 10/04/2021    APTT 55.5 10/04/2021       HCG (If Applicable): No results found for: PREGTESTUR, PREGSERUM, HCG, HCGQUANT     ABGs: No results found for: PHART, PO2ART, SZA3YXM, SWL0IVC, BEART, O3UVPSEO     Type & Screen (If Applicable):  No results found for: LABABO, LABRH    Drug/Infectious Status (If Applicable):  No results found for: HIV, HEPCAB    COVID-19 Screening (If Applicable):   Lab Results   Component Value Date    COVID19 Not Detected 12/08/2021           Anesthesia Evaluation  Patient summary reviewed and Nursing notes reviewed no history of anesthetic complications:   Airway: Mallampati: III     Neck ROM: full   Dental:          Pulmonary:   (+) shortness of breath:                             Cardiovascular:    (+) hypertension:,                   Neuro/Psych:               GI/Hepatic/Renal:            ROS comment: Obesity. Endo/Other:    (+) Diabetes, . Abdominal:             Vascular: Other Findings:             Anesthesia Plan      general and regional     ASA 3     (Medications & allergies reviewed  All available lab & EKG data reviewed  FIB for POA)  Induction: intravenous. Anesthetic plan and risks discussed with patient. Plan discussed with CRNA.                   Elba Walden MD   12/13/2021

## 2021-12-13 NOTE — PROGRESS NOTES
Patient transported to room C-538, stable on room air, with all personal belongings and IS with him. Patient denies additional needs at this time. Patient's wife is waiting for him in assigned room.

## 2021-12-13 NOTE — ANESTHESIA POSTPROCEDURE EVALUATION
Department of Anesthesiology  Postprocedure Note    Patient: Lanny Duke  MRN: [id number]  YOB: 1940  Date of evaluation: 12/13/2021  Time:  3:58 PM     Procedure Summary     Date: 12/13/21 Room / Location: [facility]    Anesthesia Start: 1030 Anesthesia Stop:     Procedure: LEFT TOTAL HIP ARTHROPLASTY MINIMALLY INVASIVE DIRECT ANTERIOR WITH FASCIAILIACA BLOCK FOR PAIN CONTROL                        Pelayo Freshwater (Left Hip) Diagnosis:       Primary osteoarthritis of left hip      (LEFT HIP PRIMARY OSTEOARTHRITIS)    Surgeons: Billie Downey MD Responsible Provider: Clinton Chapa MD    Anesthesia Type: general, regional ASA Status: 3          Anesthesia Type: No value filed. Taryn Phase I: Taryn Score: 9    Taryn Phase II:      Last vitals: Reviewed and per EMR flowsheets. Anesthesia Post Evaluation    Patient location during evaluation: PACU  Patient participation: complete - patient participated  Level of consciousness: awake and alert  Airway patency: patent  Nausea & Vomiting: no nausea and no vomiting  Complications: no  Cardiovascular status: blood pressure returned to baseline  Respiratory status: acceptable  Hydration status: euvolemic  Comments: VSS on transfer to phase 2 recovery. No anesthetic complications.

## 2021-12-13 NOTE — CONSULTS
Hospital Medicine  Consult History & Physical        Reason for consult: Postop medical management    Date of Service: Pt seen/examined in consultation on 12/13/2021    History Of Present Illness:      [de-identified] y.o. male who we are asked to see/evaluate by Radha Cortes MD for medical management  Pt presented today for an elective surgery left total hip arthroplasty by Dr. Migdalia Ellis. Surgery went as planned without any complications, EBL was 284 cc as reported on surgical procedure note. Pt is currently seen on the floor post op. he seems to be doing fairly well, c/o mild soreness in the left hip region that is well controlled with the pain medications ordered, apart from that she denies any chest pain, dyspnea, abdominal pain, fevers or chills, nausea or vomiting. Hospitalist team was consulted for medical management. Past Medical History:        Diagnosis Date    Arthritis     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    Diabetes mellitus ([address]     ED (erectile dysfunction)     Erectile dysfunction 2/2/2016    Hyperlipidemia     Hypertension     Nocturia     Pre-diabetes     Prostate CA ([address]     seeds implanted approx 2004       Past Surgical History:        Procedure Laterality Date    CATARACT REMOVAL Bilateral 03/2017    COLONOSCOPY  9/24/14    multiple polyp    JOINT REPLACEMENT Left 9/1/2015    left total knee replacement    KNEE ARTHROSCOPY      left knee    KNEE SURGERY Left 1963    PROSTATE SURGERY      TONSILLECTOMY         Medications Prior to Admission:    Prior to Admission medications    Medication Sig Start Date End Date Taking? Authorizing Provider   hydrocortisone 2.5 % cream Apply topically 2 times daily.  11/29/21  Yes Camilla Rios DO   mupirocin (BACTROBAN) 2 % ointment Apply twice daily for 5 days to each nare, begin 12/08/2021 11/22/21 12/13/21 Yes Radha Cortes MD   lisinopril (PRINIVIL;ZESTRIL) 20 MG tablet TAKE 1/2 TABLET (10 mg) BY MOUTH ONE TIME A DAY 11/19/21  Yes Bean Aleman Alverta Severin, MD   metFORMIN (GLUCOPHAGE) 500 MG tablet Take 1 tablet by mouth daily (with breakfast) 9/2/21  Yes Camilla Rios DO   atorvastatin (LIPITOR) 40 MG tablet TAKE 1 TABLET BY MOUTH ONE TIME A DAY  8/23/21  Yes Camilla Rios DO   atenolol-chlorthalidone (TENORETIC) 100-25 MG per tablet Take 1 tablet by mouth daily  Patient taking differently: Take 1 tablet by mouth daily Takes 1/2 tablet daily 8/17/20  Yes Camilla Rios DO   Multiple Vitamins-Minerals (THERAPEUTIC MULTIVITAMIN-MINERALS) tablet Take 1 tablet by mouth daily    Yes Historical Provider, MD   apixaban (ELIQUIS) 5 MG TABS tablet Take 1 tablet by mouth 2 times daily 9/7/21   García Ledesma MD       Allergies:  Bactrim [sulfamethoxazole-trimethoprim] and Pcn [penicillins]    Social History:      The patient currently lives at home    TOBACCO:   reports that he has never smoked. He has never used smokeless tobacco.  ETOH:   reports current alcohol use. Family History:   Positive as follows:        Problem Relation Age of Onset    Cancer Father         leukemia       REVIEW OF SYSTEMS COMPLETED:   Pertinent positives as noted in the HPI. All other systems reviewed and negative. PHYSICAL EXAM PERFORMED:  BP (!) 161/69   Pulse 55   Temp 97.3 °F (36.3 °C) (Oral)   Resp 18   Ht 5' 9\" (1.753 m)   Wt 229 lb (103.9 kg)   SpO2 96%   BMI 33.82 kg/m²   General appearance: No apparent distress, appears stated age and cooperative. HEENT: Normal cephalic, atraumatic without obvious deformity. Pupils equal, round, and reactive to light. Extra ocular muscles intact. Conjunctivae/corneas clear. Neck: Supple, with full range of motion. No jugular venous distention. Trachea midline. Respiratory:  Normal respiratory effort. Clear to auscultation, bilaterally without Rales/Wheezes/Rhonchi. Cardiovascular: Regular rate and rhythm with normal S1/S2 without murmurs, rubs or gallops.   Abdomen: Soft, non-tender, non-distended with normal bowel sounds. Musculoskeletal:  No clubbing, cyanosis or edema bilaterally. Left hip over dressing in place. Good range of motion of left hip  Skin: Skin color, texture, turgor normal.  No rashes or lesions. Neurologic:  Neurovascularly intact without any focal sensory/motor deficits. Cranial nerves: II-XII intact, grossly non-focal.  Psychiatric: Alert and oriented, thought content appropriate, normal insight  Capillary Refill: Brisk,3 seconds, normal   Peripheral Pulses: +2 palpable, equal bilaterally     Labs:     No results for input(s): WBC, HGB, HCT, PLT in the last 72 hours. No results for input(s): NA, K, CL, CO2, BUN, CREATININE, CALCIUM, PHOS in the last 72 hours. Invalid input(s): MAGNES  No results for input(s): AST, ALT, BILIDIR, BILITOT, ALKPHOS in the last 72 hours. Recent Labs     12/13/21  0940   INR 1.32*     No results for input(s): Zabrina Salas in the last 72 hours. Urinalysis:    Lab Results   Component Value Date    NITRU Negative 10/04/2021    WBCUA 0-2 08/25/2015    BACTERIA Rare 08/25/2015    RBCUA 0-2 08/25/2015    BLOODU Negative 10/04/2021    SPECGRAV 1.010 10/04/2021    GLUCOSEU Negative 10/04/2021       Radiology: I have reviewed the radiology reports with the following interpretation:     XR HIP 1 VW W PELVIS LEFT   Final Result   Total hip arthropasty without acute hardware complication. XR HIP LEFT (2-3 VIEWS)   Final Result   As above. FLUORO FOR SURGICAL PROCEDURES   Final Result             ASSESSMENT:PLAN:    Left hip osteoarthritis  Status post left total hip arthroplasty  Pain management and perioperative antibiotics per orthopedics  DVT PPx Eliquis ordered  Incentive spirometry  Follow labs in a.m.     A. Fib -stable  Continue atenolol and Eliquis    Moderate aortic valve stenosis  Followed in the valve clinic by Dr. Gregory Amaya    Prediabetes -Metformin and sliding scale insulin ordered    HTN-controlled, home medications resumed    HLD-statin

## 2021-12-13 NOTE — PROGRESS NOTES
Pt arrives to [facility] alert and oriented. VSS. Family at [facility] , RN and Anesthesia consents signed.   Taking pt to Room 9 for nerve block

## 2021-12-13 NOTE — PROGRESS NOTES
Patient alert and stable on 2L per NC. Eating crackers and drinking without issues. Patient's wife updated at this time face to face; all questions answered.

## 2021-12-13 NOTE — PROGRESS NOTES
Patient to PACU from OR. Report received from [facility] and [facility]. Patient with eyes closed, alert to voice and stable on RA. Patient without s/s of pain or distress when assessed. SCD's in place; ICE applied. VS obtained and filed. Will continue to monitor.

## 2021-12-13 NOTE — ANESTHESIA PROCEDURE NOTES
Peripheral Block    Patient location during procedure: pre-op  Start time: 12/13/2021 9:47 AM  End time: 12/13/2021 9:52 AM  Staffing  Performed: anesthesiologist   Anesthesiologist: Wild Truong MD  Preanesthetic Checklist  Completed: patient identified, IV checked, site marked, risks and benefits discussed, surgical consent, monitors and equipment checked, pre-op evaluation, timeout performed, anesthesia consent given, oxygen available and patient being monitored  Peripheral Block  Patient position: supine  Prep: ChloraPrep  Patient monitoring: continuous pulse ox and IV access  Block type: Fascia iliaca  Laterality: left  Injection technique: single-shot  Guidance: ultrasound guided  Provider prep: sterile gloves  Needle  Needle type: combined needle/nerve stimulator   Needle gauge: 21 G  Needle length: 10 cm  Needle localization: ultrasound guidance  Test dose: negative  Assessment  Injection assessment: negative aspiration for heme, no paresthesia on injection and local visualized surrounding nerve on ultrasound  Paresthesia pain: none  Slow fractionated injection: yes  Hemodynamics: stable  Additional Notes  BPV 0.25% 20cc in divided doses    IVS:  Propofol 50mg  Reason for block: post-op pain management and at surgeon's request

## 2021-12-13 NOTE — OP NOTE
Orthopaedic Surgery  Operative Report      Patient Name:  Lanny Duke  Patient :  1940  MRN: [id number]    Date: 21     Pre-operative Diagnosis:   M16.12 Left hip primary osteoarthritis    Post-operative Diagnosis:    Same    Procedure: LEFT  62156 Total Hip Arthroplasty, direct anterior    Surgeon:  Surgeon(s) and Role:     * Billie Downey MD - Primary    Assistant: Circulator: Raman Osorio RN; Kiley Mercado RN  Surgical Assistant: Ezequiel Hazel  Radiology Technologist: Bryon Sidhu Scrub: [facility]  Scrub Person First: Rd Pillai  Perfusion Assistant: Quirino Victoria  Physician Assistant (first assistant): Suni Wilcox PA-C    Anesthesia: General endotracheal anesthesia, Intraoperative local infiltration - Exparel/marcaine solution and Regional with fascia Iliaca block    Estimated blood loss: 300    Specimens:   ID Type Source Tests Collected by Time Destination   A : LEFT FEMORAL HEAD Joint/Joint Fluid Hip SURGICAL PATHOLOGY Billie Downey MD 2021 [id number]        Complications: None    Drains: None    Condition: Stable    Implants:   Implant Name Type Inv.  Item Serial No.  Lot No. LRB No. Used Action   G7 OSSEOTI 4 HOLE SHELL 58MM G  G7 OSSEOTI 4 HOLE SHELL 58MM G  [facility] [employer] [facility] [id number] Left 1 Implanted   LINER ACET NEUT G 40 MM VIVACIT-E G7  LINER ACET NEUT G 40 MM VIVACIT-E G7  [facility] [employer] [facility] [facility] [id number] Left 1 Implanted   STEM FEM SZ 7 L134MM HI OFFSET HIP TI PPS TYP 1 TAPR FULL  STEM FEM SZ 7 L134MM HI OFFSET HIP TI PPS TYP 1 TAPR FULL  [facility] [id number] Left 1 Implanted   SLEEVE FEM +3MM OFFSET HIP TYP 1 TAPR OPT HD ACT ARTC 2  SLEEVE FEM +3MM OFFSET HIP TYP 1 TAPR OPT HD ACT ARTC 2  [facility] [id number] Left 1 Implanted   HEAD FEM [id number] HIP BIOLOX DELT OPT FOR G7 ACET SYS  HEAD FEM [id number] HIP BIOLOX DELT OPT FOR G7 ACET SYS  [facility] [id number] Left 1 Implanted Findings:   1. End stage OA  2. History of prostate cancer, sent off pathology specimen of the femoral head, although essentially no intraoperative or preoperative concern    Indications: The patient has been battling left hip pain for months to years. Pain has gotten worse recently. Patient has failed all preoperative conservative treatment options. The activities of daily living have been affected quite a bit. Patient wanted to regain mobility and be active as possible. Patient understood the risk benefits and alternatives in detail and wanted to proceed with the above operation. Procedure Details:   I marked the surgical site of the left hip for surgery. He was taken back to the operating theater laid supine the table the bony prominences well-padded. General anesthesia was induced. We transferred the patient to the operating table, [place] bed. X-ray was acquired marking the left hip as the preoperative templating hip. Antibiotics 2 g of Ancef were given. MRSA swab testing was performed preop, and no additional antibiotics were required. Tranexamic acid was held due to paroxysmal atrial fibrillation and routine anticoagulation use. We began with a direct anterior approach of the hip. Martínez-Cohn interval was taken. We incised the tensor fascia etta. We bluntly developed a plane between that and the rectus. Lateral edge of the rectus fascia was incised the muscle belly was retracted medially. The underlying fascia was also incised. Underlying vessels lateral circumflex were tied off on each end with silk suture. Electro Bovie cautery was then used to incise through the capsule. A femoral neck osteotomy was performed based on preoperative template. Using a corkscrew device we removed the existing head ball. We then placed retractors around the acetabulum. I cleaned out the pelvic tissue as well as the existing labrum. Sequential reaming was then performed.   We stopped at the appropriately sized reamer and impacted the real acetabular shell. X-ray confirmed that the socket was in acceptable position based off of a good AP pelvis x-ray. Standard poly liner was used. We then turned our attention to the femoral exposure. The [place] table femoral elevating hook was then placed just inside the fascia but lateral to the vastus. This went around the posterior edge of the femur. Leg was then dropped to the floor and abducted. 90 degrees of external rotation was achieved. We then performed small capsulotomy posteriorly to place a 1 #1 retractor. Placed a #3 Sonia retractor medially. I use the table hook to elevate the femur for exposure. I externally rotated more to deliver the femoral neck via the [place] table. The femur was prepped using a box chisel, canal finder and sequential broaching only. We are happy with the appropriately sized stem. Trial was then placed with a +3 head ball first.  The hip was then reduced using standard technique. X-ray confirmed the implants were in reasonable position. We then redislocated and remove the existing trial and implanted the real stem femoral component. +3 head ball was then inserted and impacted. Hip reduction was then performed. We performed anterior and posterior hip stability checks which were successful. We also performed shuck test which is very acceptable with the existing tension. We performed sequential closure. I irrigated the wound thoroughly with 3 L normal saline. I placed 2 g of vancomycin powder around the wound. I also injected a mixture of Marcaine and Exparel into the perioperative field. Adequate hemostasis was achieved. #2 Ethibond approximated the capsular leaflets. #2 Quill suture approximated the fascial layer as well as the deep subcutaneous layer to remove dead space. 2-0 Vicryl interrupted sutures closed the subcutaneous tissue layer. Monocryl subcuticular suture was then applied. Dermabond Prineo was then used with a nonadhesive dressing. Patient then was reversed in general esthesia transferred back the postoperative care unit without any complications. All instrument counts were correct x2. I was present throughout the entire to the case with the exception of skin closure. Physician Assistant:  Gin Resendez PA-C, was critical throughout this case. He performed patient positioning, intraoperative retraction and joint manipulation, and closure of the surgery. PLAN:  - WBAT with assist device  - Resume Eliquis 5 mg BID  - ambulate postop with PT  - resume home meds, diet  - f/u scheduled with me in 2-3 weeks  - dispo:  Inpatient stay      Electronically signed by Robbin Bates MD on 12/13/2021 at 3:02 PM

## 2021-12-14 VITALS
WEIGHT: 229 LBS | DIASTOLIC BLOOD PRESSURE: 67 MMHG | BODY MASS INDEX: 33.92 KG/M2 | HEIGHT: 69 IN | RESPIRATION RATE: 16 BRPM | OXYGEN SATURATION: 96 % | HEART RATE: 59 BPM | SYSTOLIC BLOOD PRESSURE: 160 MMHG | TEMPERATURE: 97.8 F

## 2021-12-14 LAB
ANION GAP SERPL CALCULATED.3IONS-SCNC: 12 MMOL/L (ref 3–16)
BASOPHILS ABSOLUTE: 0 K/UL (ref 0–0.2)
BASOPHILS RELATIVE PERCENT: 0.2 %
BUN BLDV-MCNC: 14 MG/DL (ref 7–20)
CALCIUM SERPL-MCNC: 9.2 MG/DL (ref 8.3–10.6)
CHLORIDE BLD-SCNC: 99 MMOL/L (ref 99–110)
CO2: 23 MMOL/L (ref 21–32)
CREAT SERPL-MCNC: <0.5 MG/DL (ref 0.8–1.3)
EOSINOPHILS ABSOLUTE: 0 K/UL (ref 0–0.6)
EOSINOPHILS RELATIVE PERCENT: 0.1 %
GFR AFRICAN AMERICAN: >60
GFR NON-AFRICAN AMERICAN: >60
GLUCOSE BLD-MCNC: 100 MG/DL (ref 70–99)
GLUCOSE BLD-MCNC: 129 MG/DL (ref 70–99)
GLUCOSE BLD-MCNC: 131 MG/DL (ref 70–99)
GLUCOSE BLD-MCNC: 137 MG/DL (ref 70–99)
HCT VFR BLD CALC: 35.6 % (ref 40.5–52.5)
HEMOGLOBIN: 11.6 G/DL (ref 13.5–17.5)
LYMPHOCYTES ABSOLUTE: 0.7 K/UL (ref 1–5.1)
LYMPHOCYTES RELATIVE PERCENT: 8.1 %
MCH RBC QN AUTO: 26.8 PG (ref 26–34)
MCHC RBC AUTO-ENTMCNC: 32.5 G/DL (ref 31–36)
MCV RBC AUTO: 82.6 FL (ref 80–100)
MONOCYTES ABSOLUTE: 0.6 K/UL (ref 0–1.3)
MONOCYTES RELATIVE PERCENT: 6.3 %
NEUTROPHILS ABSOLUTE: 7.7 K/UL (ref 1.7–7.7)
NEUTROPHILS RELATIVE PERCENT: 85.3 %
PDW BLD-RTO: 16.4 % (ref 12.4–15.4)
PERFORMED ON: ABNORMAL
PLATELET # BLD: 222 K/UL (ref 135–450)
PMV BLD AUTO: 8.3 FL (ref 5–10.5)
POTASSIUM REFLEX MAGNESIUM: 3.9 MMOL/L (ref 3.5–5.1)
RBC # BLD: 4.32 M/UL (ref 4.2–5.9)
SODIUM BLD-SCNC: 134 MMOL/L (ref 136–145)
WBC # BLD: 9.1 K/UL (ref 4–11)

## 2021-12-14 PROCEDURE — 6360000002 HC RX W HCPCS: Performed by: PHYSICIAN ASSISTANT

## 2021-12-14 PROCEDURE — 97166 OT EVAL MOD COMPLEX 45 MIN: CPT

## 2021-12-14 PROCEDURE — 97530 THERAPEUTIC ACTIVITIES: CPT

## 2021-12-14 PROCEDURE — 80048 BASIC METABOLIC PNL TOTAL CA: CPT

## 2021-12-14 PROCEDURE — 6370000000 HC RX 637 (ALT 250 FOR IP): Performed by: INTERNAL MEDICINE

## 2021-12-14 PROCEDURE — 97116 GAIT TRAINING THERAPY: CPT

## 2021-12-14 PROCEDURE — 85025 COMPLETE CBC W/AUTO DIFF WBC: CPT

## 2021-12-14 PROCEDURE — 6370000000 HC RX 637 (ALT 250 FOR IP): Performed by: PHYSICIAN ASSISTANT

## 2021-12-14 PROCEDURE — 97162 PT EVAL MOD COMPLEX 30 MIN: CPT

## 2021-12-14 PROCEDURE — 36415 COLL VENOUS BLD VENIPUNCTURE: CPT

## 2021-12-14 PROCEDURE — 97535 SELF CARE MNGMENT TRAINING: CPT

## 2021-12-14 RX ORDER — OXYCODONE HYDROCHLORIDE 5 MG/1
5 TABLET ORAL EVERY 6 HOURS PRN
Qty: 24 TABLET | Refills: 0 | Status: SHIPPED | OUTPATIENT
Start: 2021-12-14 | End: 2021-12-21

## 2021-12-14 RX ORDER — POLYETHYLENE GLYCOL 3350 17 G/17G
17 POWDER, FOR SOLUTION ORAL DAILY
Qty: 527 G | Refills: 1 | Status: SHIPPED | OUTPATIENT
Start: 2021-12-15 | End: 2022-01-14

## 2021-12-14 RX ADMIN — POLYETHYLENE GLYCOL 3350 17 G: 17 POWDER, FOR SOLUTION ORAL at 08:05

## 2021-12-14 RX ADMIN — LISINOPRIL 10 MG: 10 TABLET ORAL at 08:13

## 2021-12-14 RX ADMIN — CHLORTHALIDONE 25 MG: 25 TABLET ORAL at 08:04

## 2021-12-14 RX ADMIN — CEFAZOLIN 2000 MG: 10 INJECTION, POWDER, FOR SOLUTION INTRAVENOUS at 06:41

## 2021-12-14 RX ADMIN — ACETAMINOPHEN 650 MG: 325 TABLET ORAL at 11:58

## 2021-12-14 RX ADMIN — METFORMIN HYDROCHLORIDE 500 MG: 500 TABLET ORAL at 08:05

## 2021-12-14 RX ADMIN — ATORVASTATIN CALCIUM 40 MG: 40 TABLET, FILM COATED ORAL at 08:04

## 2021-12-14 RX ADMIN — Medication 1 TABLET: at 08:04

## 2021-12-14 RX ADMIN — ACETAMINOPHEN 650 MG: 325 TABLET ORAL at 06:38

## 2021-12-14 RX ADMIN — ACETAMINOPHEN 650 MG: 325 TABLET ORAL at 16:29

## 2021-12-14 ASSESSMENT — PAIN SCALES - WONG BAKER
WONGBAKER_NUMERICALRESPONSE: 0

## 2021-12-14 ASSESSMENT — PAIN DESCRIPTION - LOCATION: LOCATION: HIP

## 2021-12-14 ASSESSMENT — PAIN SCALES - GENERAL
PAINLEVEL_OUTOF10: 2
PAINLEVEL_OUTOF10: 0
PAINLEVEL_OUTOF10: 3

## 2021-12-14 ASSESSMENT — PAIN DESCRIPTION - ORIENTATION: ORIENTATION: LEFT

## 2021-12-14 ASSESSMENT — PAIN DESCRIPTION - PAIN TYPE: TYPE: SURGICAL PAIN

## 2021-12-14 NOTE — DISCHARGE SUMMARY
Department of Orthopedic Surgery  Physician Assistant   Discharge Summary      The Jorge Alberto Saunders is a [de-identified] y.o. male underwent total hip replacement procedure without complication. Jorge Alberto Saunders was admitted to the floor following their recovery in the PACU.      Discharge Diagnosis  left Hip Replacement    Current Inpatient Medications    Current Facility-Administered Medications: apixaban (ELIQUIS) tablet 5 mg, 5 mg, Oral, BID  atorvastatin (LIPITOR) tablet 40 mg, 40 mg, Oral, Daily  lisinopril (PRINIVIL;ZESTRIL) tablet 10 mg, 10 mg, Oral, Daily  metFORMIN (GLUCOPHAGE) tablet 500 mg, 500 mg, Oral, Daily with breakfast  therapeutic multivitamin-minerals 1 tablet, 1 tablet, Oral, Daily  glucose (GLUTOSE) 40 % oral gel 15 g, 15 g, Oral, PRN  dextrose 50 % IV solution, 12.5 g, IntraVENous, PRN  glucagon (rDNA) injection 1 mg, 1 mg, IntraMUSCular, PRN  dextrose 5 % solution, 100 mL/hr, IntraVENous, PRN  lactated ringers infusion, , IntraVENous, Continuous  sodium chloride flush 0.9 % injection 10 mL, 10 mL, IntraVENous, 2 times per day  sodium chloride flush 0.9 % injection 10 mL, 10 mL, IntraVENous, PRN  0.9 % sodium chloride infusion, 25 mL, IntraVENous, PRN  acetaminophen (TYLENOL) tablet 650 mg, 650 mg, Oral, Q6H  oxyCODONE (ROXICODONE) immediate release tablet 5 mg, 5 mg, Oral, Q4H PRN **OR** oxyCODONE (ROXICODONE) immediate release tablet 10 mg, 10 mg, Oral, Q4H PRN  morphine (PF) injection 2 mg, 2 mg, IntraVENous, Q2H PRN **OR** morphine sulfate (PF) injection 4 mg, 4 mg, IntraVENous, Q2H PRN  polyethylene glycol (GLYCOLAX) packet 17 g, 17 g, Oral, Daily  ondansetron (ZOFRAN-ODT) disintegrating tablet 4 mg, 4 mg, Oral, Q8H PRN **OR** ondansetron (ZOFRAN) injection 4 mg, 4 mg, IntraVENous, Q6H PRN  insulin lispro (HUMALOG) injection vial 0-6 Units, 0-6 Units, SubCUTAneous, TID WC  insulin lispro (HUMALOG) injection vial 0-3 Units, 0-3 Units, SubCUTAneous, Nightly  atenolol (TENORMIN) tablet 100 mg, 100 mg, Oral, Daily  chlorthalidone (HYGROTON) tablet 25 mg, 25 mg, Oral, Daily    Post-operatively the patients diet was advanced as tolerated and their incision was checked on POD #1. The incision is dressing in place, clean, dry, intact and some bloody drainage pn the proximal dressing with no signs of infection. The patient remained neurovascularly intact in the lower extremity and had intact pulses distally. Patients calf remained soft and showed no evidence of DVT. The patient was able to move their leg and ankle/foot without any problems post-operatively. Physical therapy and occupational therapy were consulted and began working with the patient post-operatively. The patient progressed with PT/OT as would be expected and continued to improve through their stay. The patients pain was initially controlled with IV medications but we were able to transition to oral pain medications soon after arrival to the floor and their pain remained under good control through their hospital stay. From a medical standpoint the patient remained stable and continued to have the medicine team follow throughout their stay. The patients dressing was changed/incision was checked on day of d/c. The patient will be discharged at this time to Home  with their current diet restrictions and will continue to follow the hip precautions outlined to them by us and PT/OT. Condition on Discharge: Stable    Plan  Return visit in 2 weeks. .  Patient was instructed on the use of pain medications, the signs and symptoms of infection, and was given our number to call should they have any questions or concerns following discharge. For opioid prescriptions given at discharge the following statement is provided for compliance with [employer] rules.   Patient being given increased dosage/quantity of opoid pain medication in excess of [employer] guidelines which noted a 30 MED daily of opioids due to the fact that he/she has undergone major orthopaedic

## 2021-12-14 NOTE — PROGRESS NOTES
All IV's removed without complications. D/C instructions given to pt. Pt verbalizes understanding and all questions answered. Pt instructed on where to  medications and  given additional Mepilex in case site becomes saturated. Pt instructed on how to care for site and verbalizes an understanding.

## 2021-12-14 NOTE — PROGRESS NOTES
Hospitalist Progress Note      PCP: Gil Zeng DO    Date of Admission: 12/13/2021    Chief Complaint: Left hip pain    Hospital Course: Left hip OA status post left total hip arthroplasty on 12/13/21. Subjective: EMR reviewed. VSS, afebrile. No acute issues overnight.      Medications:  Reviewed    Infusion Medications    dextrose      lactated ringers 125 mL/hr at 12/13/21 1751    sodium chloride       Scheduled Medications    apixaban  5 mg Oral BID    atorvastatin  40 mg Oral Daily    lisinopril  10 mg Oral Daily    metFORMIN  500 mg Oral Daily with breakfast    therapeutic multivitamin-minerals  1 tablet Oral Daily    sodium chloride flush  10 mL IntraVENous 2 times per day    acetaminophen  650 mg Oral Q6H    polyethylene glycol  17 g Oral Daily    insulin lispro  0-6 Units SubCUTAneous TID WC    insulin lispro  0-3 Units SubCUTAneous Nightly    atenolol  100 mg Oral Daily    chlorthalidone  25 mg Oral Daily     PRN Meds: glucose, dextrose, glucagon (rDNA), dextrose, sodium chloride flush, sodium chloride, oxyCODONE **OR** oxyCODONE, morphine **OR** morphine, ondansetron **OR** ondansetron      Intake/Output Summary (Last 24 hours) at 12/14/2021 1508  Last data filed at 12/14/2021 0757  Gross per 24 hour   Intake    Output 800 ml   Net -800 ml       Physical Exam Performed:    BP (!) 160/67   Pulse 59   Temp 97.8 °F (36.6 °C) (Oral)   Resp 16   Ht 5' 9\" (1.753 m)   Wt 229 lb (103.9 kg)   SpO2 96%   BMI 33.82 kg/m²     Labs:   Recent Labs     12/14/21  0658   WBC 9.1   HGB 11.6*   HCT 35.6*        Recent Labs     12/14/21  0658   *   K 3.9   CL 99   CO2 23   BUN 14   CREATININE <0.5*   CALCIUM 9.2     Recent Labs     12/13/21  0940   INR 1.32*     Urinalysis:      Lab Results   Component Value Date    NITRU Negative 10/04/2021    WBCUA 0-2 08/25/2015    BACTERIA Rare 08/25/2015    RBCUA 0-2 08/25/2015    BLOODU Negative 10/04/2021    SPECGRAV 1.010 10/04/2021 GLUCOSEU Negative 10/04/2021       Radiology:  XR HIP 1 VW W PELVIS LEFT   Final Result   Total hip arthropasty without acute hardware complication. XR HIP LEFT (2-3 VIEWS)   Final Result   As above. FLUORO FOR SURGICAL PROCEDURES   Final Result              Assessment/Plan:    Active Hospital Problems    Diagnosis     Localized osteoarthrosis of left hip [M16.12]     Atrial fibrillation (HCC) [I48.91]     Nonrheumatic aortic valve stenosis [I35.0]     Pre-diabetes [R73.03]     Essential hypertension [I10]     Mixed hyperlipidemia [E78.2]        Left hip OA status post left total hip arthroplasty on 12/13/21:  - Postoperative management per Orthopedic surgery. - Continue prn analgesia, bowel regimen, IS, DVT prophylaxis and PT/OT.      Persistent atrial fibrillation, rate controlled:  - Continue atenolol and Eliquis.     Moderate aortic valve stenosis:  - Followed in the valve clinic by Dr. Mitesh Gibbons.     Prediabetes   - Continue his home regimen.      Hypertension  - Controlled, continue home meds.      HLD:  - Continue statin. DVT Prophylaxis: On eliquis   Diet: ADULT DIET; Regular  Code Status: Full Code    PT/OT Eval Status: Active and ongoing    Dispo - Pt to be discharged today per Ortho. IM will sign off.      [address], APRN - CNP

## 2021-12-14 NOTE — CARE COORDINATION
DCP met with  Ar RUBIO Re: s/p THR NO HHC . Pt has all DME and OP therapy set up at this time. No further needs identified by DCP. Please contact should further needs arise.  Claudy Shafer RN

## 2021-12-14 NOTE — PROGRESS NOTES
Physical Therapy    Facility/Department: [place] [id number] - MED SURG/ORTHO  Initial Assessment    NAME: Goldie Rudolph  : 1940  MRN: [id number]    Date of Service: 2021    Discharge Recommendations:  24 hour supervision or assist, Home with Home health PT   PT Equipment Recommendations  Equipment Needed: Yes  Mobility Devices: Jaimie Salines: Rolling    Assessment   Body structures, Functions, Activity limitations: Decreased functional mobility ; Decreased balance; Decreased strength; Decreased safe awareness; Decreased endurance  Assessment: Pt presents to [facility] s/p L HAMLET with WBAT precautions. PTA, pt performs functinal mobility indpendently without device and has 3 GIRISH. Currently, pt requires grossly CGA for functional mobility with SW and verbal cues for safety awareness and hand placement. Pt would benefit from continued skilled PT to address deficits listed above and maximixe indpendence. Recommend home with 24/7 supervision and HHPT. Pts owns SW but may benefit from RW for more fluid gait pattern  Treatment Diagnosis: impaired functional mobility  Prognosis: Good  Decision Making: Low Complexity  PT Education: Goals; General Safety; Gait Training; Disease Specific Education; PT Role; Functional Mobility Training; Plan of Care; Family Education; Precautions; Transfer Training; Weight-bearing Education  Patient Education: Pt educated on weight bearing precautions, getting in/out of car, provided handout for information- verbalizes understanding  REQUIRES PT FOLLOW UP: Yes  Activity Tolerance  Activity Tolerance: Patient Tolerated treatment well       Patient Diagnosis(es): The encounter diagnosis was Primary osteoarthritis of left hip.     has a past medical history of Arthritis, Chronic gout without tophus, Diabetes mellitus ([address] ED (erectile dysfunction), Erectile dysfunction, Hyperlipidemia, Hypertension, Nocturia, Pre-diabetes, and Prostate CA ([address]    has a past surgical history that includes Prostate surgery; Tonsillectomy; Knee arthroscopy; knee surgery (Left, 1963); Colonoscopy (9/24/14); joint replacement (Left, 9/1/2015); Cataract removal (Bilateral, 03/2017); and Total hip arthroplasty (Left, 12/13/2021). Restrictions  Restrictions/Precautions  Restrictions/Precautions: Weight Bearing  Lower Extremity Weight Bearing Restrictions  Left Lower Extremity Weight Bearing: Weight Bearing As Tolerated  Position Activity Restriction  Other position/activity restrictions: 1)  Up to bedside chair at least three times a day as tolerated. 2)  Ambulate in room progressing to hallway with assistive device four times daily (including PT) when PT advises. 3)  Bathroom privileges with assistance.      Vision/Hearing  Vision: Impaired  Vision Exceptions: Wears glasses for reading  Hearing: Within functional limits       Subjective  General  Chart Reviewed: Yes  Patient assessed for rehabilitation services?: Yes  Response To Previous Treatment: Not applicable  Family / Caregiver Present: No  Referring Practitioner: Iesha Guzman PA-C  Referral Date : 12/13/21  Diagnosis: L HAMLET  Follows Commands: Within Functional Limits  General Comment  Comments: RN cleared pt for therapy eval  Subjective  Subjective: Pt semi-supine in bed upon arrival with RN at bedside, agreeable to PT eval  Pain Screening  Patient Currently in Pain: Denies  Vital Signs  Patient Currently in Pain: Denies     Orientation  Orientation  Overall Orientation Status: Within Normal Limits     Social/Functional History  Social/Functional History  Lives With: Spouse  Type of Home: House  Home Layout: One level, Laundry in basement  Home Access: Stairs to enter with rails  Entrance Stairs - Number of Steps: 3  Entrance Stairs - Rails: Right  Bathroom Shower/Tub: Tub/Shower unit  Bathroom Toilet: Standard  Bathroom Equipment: Shower chair  Bathroom Accessibility: Walker accessible  Home Equipment: Standard walker, Cane  ADL Assistance: Independent  Homemaking Responsibilities: Yes  Meal Prep Responsibility: Secondary  Laundry Responsibility: Secondary  Cleaning Responsibility: Secondary  Shopping Responsibility: Secondary  Ambulation Assistance: Independent  Transfer Assistance: Independent  Active : Yes  Occupation: Retired  Type of occupation: [email or web address]: watch TV, walking [facility] [employer]  Additional Comments: Pt reports no falls in the last 6 months. Objective  Strength RLE  Strength RLE: WFL  Strength LLE  Strength LLE: Exception (Not formally assessed due to pain with resistance)     Sensation  Overall Sensation Status: WFL     Bed mobility  Supine to Sit: Stand by assistance  Sit to Supine: Unable to assess  Scooting: Stand by assistance (Scooting in seated to EOB)     Transfers  Sit to Stand: Contact guard assistance  Stand to sit: Contact guard assistance  Comment: with SW, Verbal cues for hand placement with each transfer     Ambulation  Ambulation?: Yes  WB Status: WBAT L  Ambulation 1  Surface: level tile  Device: Standard Walker  Assistance: Contact guard assistance  Quality of Gait: step-to gait pattern, antalgic, slow alan  Distance: 15 ft + 20 ft  Comments: Pt ambulates with SW with initial verbal cues for sequencing. Balance  Sitting - Static: Good  Sitting - Dynamic: Good  Standing - Static: Fair  Standing - Dynamic: [facility]  Times per week: 7x/week BID  Times per day: Twice a day  Current Treatment Recommendations: Strengthening, Neuromuscular Re-education, Home Exercise Program, Safety Education & Training, Patient/Caregiver Education & Training, Endurance Training, Balance Training, Functional Mobility Training, Transfer Training, Stair training, Gait Training, Equipment Evaluation, Education, & procurement  Safety Devices  Type of devices:  All fall risk precautions in place, Left in chair, Nurse notified, Chair alarm in place, Gait belt, Patient at risk for falls, Call light within reach    AM-PAC Score  AM-PAC Inpatient Mobility Raw Score : 20 (12/14/21 1027)  AM-PAC Inpatient T-Scale Score : 47.67 (12/14/21 1027)  Mobility Inpatient CMS 0-100% Score: 35.83 (12/14/21 1027)  Mobility Inpatient CMS G-Code Modifier : CJ (12/14/21 1027)        Goals  Short term goals  Time Frame for Short term goals: 7 days (12/21/2021)  Short term goal 1: Pt will performed bed mobility with supervision, HOB flat, no bedrails to simulate home enviornment  Short term goal 2: Pt will perform transfer with LRAD with supervision and no cues for safety  Short term goal 3: Pt will ambulate 50 ft with LRAD and supervision  Short term goal 4: Pt will perform 3 Steps with SBA and LRAD  Short term goal 5: Pt will demo understanding of HEP to improve strength and mobility  Patient Goals   Patient goals : \"to walk 50 ft without physical assistance\"       Therapy Time   Individual Concurrent Group Co-treatment   Time In 0809         Time Out 0846         Minutes 37         Timed Code Treatment Minutes: 27 Minutes (10 min eval)      If pt is unable to be seen after this session, please let this note serve as discharge summary. Please see case management note for discharge disposition. Thank you.     Lady Negron, PT

## 2021-12-14 NOTE — PROGRESS NOTES
Occupational Therapy   Occupational Therapy Initial Assessment/Treatment  Date: 2021   Patient Name: Ermias Anaya  MRN: [id number]     : 1940    Date of Service: 2021    Discharge Recommendations:  Home with Home health OT, 24 hour supervision or assist  OT Equipment Recommendations  Equipment Needed: Yes  Mobility Devices: ADL Assistive Devices  ADL Assistive Devices: Sock-Aid Hard    Assessment   Performance deficits / Impairments: Decreased functional mobility ; Decreased ADL status; Decreased balance; Decreased safe awareness; Decreased cognition; Decreased coordination    Assessment: Pt is an [de-identified] male with deficits in the areas listed above after a L HAMLET with pt WB status as WBAT. Pt is typically mod (I) at baseline and will have 24hr A at home per pt. Pt performed functional mobility/t/fs and ADLS with CGA and SW today with min vc's for education on safety and safe t/fs. The only ADL that was more than CGA this date was LB dressing which pt required mod A and was educated on adaptive strategies/equipment that would increase independence. Pt would continue to benefit from skilled OT services to increase safety and independence with ADLS and functional mobility. Prognosis: Good  Decision Making: Medium Complexity  OT Education: OT Role; Plan of Care; Precautions; ADL Adaptive Strategies; Transfer Training; Equipment  Patient Education: Disease specific: WB status, safe t/fs and mobility, safe bed mobility, LB dressing strategies, car t/fs. Pt verbalized understanding. REQUIRES OT FOLLOW UP: Yes  Activity Tolerance  Activity Tolerance: Patient Tolerated treatment well  Activity Tolerance: /65, O2 92%, HR 53  Safety Devices  Safety Devices in place: Yes  Type of devices: Left in chair; Call light within reach;  Chair alarm in place; Gait belt; Nurse notified           Patient Diagnosis(es): The encounter diagnosis was Primary osteoarthritis of left hip.     has a past medical history of Arthritis, Chronic gout without tophus, Diabetes mellitus ([facility] [address] ED (erectile dysfunction), Erectile dysfunction, Hyperlipidemia, Hypertension, Nocturia, Pre-diabetes, and Prostate CA ([facility] [address] has a past surgical history that includes Prostate surgery; Tonsillectomy; Knee arthroscopy; knee surgery (Left, 1963); Colonoscopy (9/24/14); joint replacement (Left, 9/1/2015); Cataract removal (Bilateral, 03/2017); and Total hip arthroplasty (Left, 12/13/2021). Restrictions  Restrictions/Precautions  Restrictions/Precautions: Weight Bearing  Lower Extremity Weight Bearing Restrictions  Left Lower Extremity Weight Bearing: Weight Bearing As Tolerated  Position Activity Restriction  Other position/activity restrictions: 1)  Up to bedside chair at least three times a day as tolerated. 2)  Ambulate in room progressing to hallway with assistive device four times daily (including PT) when PT advises. 3)  Bathroom privileges with assistance. Subjective   General  Chart Reviewed: Yes  Patient assessed for rehabilitation services?: Yes  Response to previous treatment: Patient with no complaints from previous session  Family / Caregiver Present: No  Referring Practitioner: Sahil Spann PA-C  Diagnosis: Left hip primary osteoarthritis, Procedure: L Total Hip Arthroplasty, direct anterior (12/13)  Subjective  Subjective: Pt agreeable to therapy. General Comment  Comments: RN approved therapy.   Patient Currently in Pain: Denies  Pain Assessment  Dong-Shields Pain Rating: No hurt  Vital Signs  Temp: 97.6 °F (36.4 °C)  Temp Source: Oral  Pulse: 53  Heart Rate Source: Monitor  Resp: 17  BP: 129/65  BP Location: Right upper arm  MAP (mmHg): 87  Patient Position: Supine  Level of Consciousness: Alert (0)  MEWS Score: 1  Patient Currently in Pain: Denies  Oxygen Therapy  SpO2: 92 %  O2 Device: None (Room air)  Social/Functional History  Social/Functional History  Lives With: Spouse (Wife's brother will be staying with them for a while. Brother can assist physically.)  Type of Home: House  Home Layout: One level, Laundry in basement  Home Access: Stairs to enter with rails  Entrance Stairs - Number of Steps: 3  Entrance Stairs - Rails: Right  Bathroom Shower/Tub: Tub/Shower unit  Bathroom Toilet: Standard  Bathroom Equipment: Shower chair  Bathroom Accessibility: Walker accessible  Home Equipment: Standard walker, Cane  ADL Assistance: Independent  Homemaking Responsibilities: Yes (shares with wife.)  Meal Prep Responsibility: Secondary  Laundry Responsibility: Secondary  Cleaning Responsibility: Secondary  Shopping Responsibility: Secondary  Ambulation Assistance: Independent (no AD)  Transfer Assistance: Independent  Active : Yes  Occupation: Retired  Type of occupation: [address]: watch TV, walking [place]  Additional Comments: Pt reports no falls in the last 6 months. Objective   Vision: Impaired  Vision Exceptions: Wears glasses for reading  Hearing: Within functional limits    Orientation  Overall Orientation Status: Within Functional Limits     Balance  Sitting Balance: Supervision  Standing Balance: Contact guard assistance (with SW.)  Standing Balance  Time: ~1min, ~5min  Activity: to/from restroom, functional t/fs, standing ADLS. Comment: SW used, min vcs for SW use. Functional Mobility  Functional - Mobility Device: Standard Walker  Activity: To/from bathroom  Assist Level: Contact guard assistance  Toilet Transfers  Toilet - Technique: Ambulating  Equipment Used: Grab bars (standard toilet, b/l grab bars.)  Toilet Transfer: Contact guard assistance  ADL  Grooming: Contact guard assistance (SW in stance at sink to wash hands and face, and comb hair and brush teeth in stance at sink.)  LE Dressing:  Moderate assistance (to don socks.)  Toileting: Contact guard assistance (standard toilet with SW and B/L grab bars.)  Tone RUE  RUE Tone: Normotonic  Tone LUE  LUE Tone: Normotonic  Coordination  Movements Are Fluid And Coordinated: No  Coordination and Movement description: Right UE; Left UE; Tremors     Bed mobility  Supine to Sit: Stand by assistance  Sit to Supine: Unable to assess  Scooting: Unable to assess  Comment: HOB elevated. BR used. Pt ended session seated in chair. Transfers  Sit to stand: Contact guard assistance  Stand to sit: Contact guard assistance  Transfer Comments: SW used, min vc's for safe t/fs     Cognition  Overall Cognitive Status: Exceptions  Arousal/Alertness: Appropriate responses to stimuli  Following Commands:  Follows one step commands with increased time  Attention Span: Attends with cues to redirect  Memory: Appears intact  Safety Judgement: Decreased awareness of need for safety; Decreased awareness of need for assistance  Problem Solving: Decreased awareness of errors; Assistance required to identify errors made  Insights: Decreased awareness of deficits  Initiation: Does not require cues  Sequencing: Requires cues for some        Sensation  Overall Sensation Status: WFL        LUE AROM (degrees)  LUE AROM : WFL  Left Hand AROM (degrees)  Left Hand AROM: WFL  RUE AROM (degrees)  RUE AROM : WFL  Right Hand AROM (degrees)  Right Hand AROM: WFL  LUE Strength  Gross LUE Strength: WFL  L Hand General: 5/5  LUE Strength Comment: 5/5 grossly  RUE Strength  Gross RUE Strength: WFL  R Hand General: 5/5  RUE Strength Comment: 5/5 grossly                   Plan   Plan  Times per week: 4-6x/week  Current Treatment Recommendations: Strengthening, Balance Training, Functional Mobility Training, Safety Education & Training, Self-Care / ADL, Home Management Training, Patient/Caregiver Education & Training, Equipment Evaluation, Education, & procurement    AM-PAC Score        AM-PAC Inpatient Daily Activity Raw Score: 18 (12/14/21 0931)  AM-PAC Inpatient ADL T-Scale Score : 38.66 (12/14/21 0931)  ADL Inpatient CMS 0-100% Score: 46.65 (12/14/21 0931)  ADL Inpatient CMS G-Code Modifier : CK (12/14/21 0931)    Goals  Short term goals  Time Frame for Short term goals: 1 week (12/20) unless stated otherwise  Short term goal 1: Pt will toilet with mod I. Short term goal 2: Pt will perform functional and toilet t/fs with mod I. Short term goal 3: Pt will perform LB dressing the min A and AE PRN. Short term goal 4: Pt will perform standing functional activity for at least 8min with mod I and AD PRN (12/18). Patient Goals   Patient goals : Arleen Ashford try to use the restroom\"-MET       Therapy Time   Individual Concurrent Group Co-treatment   Time In 0808         Time Out 0844         Minutes 36         Timed Code Treatment Minutes: 26 Minutes (10min evaluation)       Jourdan Quiroz OTR/L  If pt discharges prior to next session, this note will serve as discharge summary. See case management note for discharge disposition.

## 2021-12-14 NOTE — PROGRESS NOTES
Department of Orthopedic Surgery  Physician Assistant   Progress Note    Subjective:       Systemic or Specific Complaints:No Complaints, denies any numbness or tingling. Pain well controlled with tylenol    Objective:     Patient Vitals for the past 24 hrs:   BP Temp Temp src Pulse Resp SpO2   12/14/21 1411 (!) 160/67 97.8 °F (36.6 °C) Oral 59 16 96 %   12/14/21 1145  97.3 °F (36.3 °C) Oral      12/14/21 0803 129/65 97.6 °F (36.4 °C) Oral 53 17 92 %   12/14/21 0313 139/69  Oral  18    12/14/21 0017 126/61 97.9 °F (36.6 °C) Oral 64 18 93 %   12/13/21 1715 (!) 161/69 97.3 °F (36.3 °C) Oral 55 18 96 %   12/13/21 1653    60 19 99 %   12/13/21 1648    56 17 98 %   12/13/21 1643 108/82   58 22 100 %   12/13/21 1638    63 17 96 %   12/13/21 1633    60 19 98 %   12/13/21 1628    63 19 99 %   12/13/21 1623 (!) 120/90   59 17 99 %   12/13/21 1618    55 20 100 %   12/13/21 1613 111/89   56 17 98 %   12/13/21 1608    50 17 97 %   12/13/21 1603    54 18 99 %   12/13/21 1558    52 19 99 %   12/13/21 1553    50 13 99 %   12/13/21 1548 (!) 130/53   59 17 99 %       General: alert, appears stated age and cooperative   Wound: Wound clean and dry no evidence of infection. Some dried bloody drainage to LILI dressing on proximal 1/3 of dressing, see attached media   Motion: Painful range of Motion in affected extremity   DVT Exam: No evidence of DVT seen on physical exam.  Negative Hayden's sign. Additional exam: Pt resting comfortably in bed.    Thigh mild swelling, compartments soft and compressible  Tender to palpation along anterior thigh   EHL, FHL, gastroc, ant tib motor intact  Sensation intact to light touch throughout LLE  Distal pulses 2+      Data Review  CBC:   Lab Results   Component Value Date    WBC 9.1 12/14/2021    RBC 4.32 12/14/2021    HGB 11.6 12/14/2021    HCT 35.6 12/14/2021     12/14/2021       Renal:   Lab Results   Component Value Date     12/14/2021 K 3.9 12/14/2021    CL 99 12/14/2021    CO2 23 12/14/2021    BUN 14 12/14/2021    CREATININE <0.5 12/14/2021    GLUCOSE 129 12/14/2021    CALCIUM 9.2 12/14/2021            Assessment:     s/p left total hip arthroplasty POD1. Plan:      1:  WBAT LLE with assistive device, anterior hip precautions, no SLR  2:  Continue Deep venous thrombosis prophylaxis- resume home eliquis 5mg BID  3:  Continue Pain Control- oxycodone, tylenol  4:  DCP updated.  OK to DC today, did well with PT/OT tolerated stairs well    Garrel Litten, [address]

## 2021-12-14 NOTE — PLAN OF CARE
Problem: Mobility - Impaired:  Goal: Achieve maximum mobility level  Description: Achieve maximum mobility level  Outcome: Ongoing     Problem: Discharge Planning:  Goal: Knowledge of discharge instructions  Description: Knowledge of discharge instructions  Outcome: Ongoing     Problem: Falls - Risk of:  Goal: Will remain free from falls  Description: Will remain free from falls  12/14/2021 1117 by Camille Leroy RN  Outcome: Ongoing

## 2021-12-14 NOTE — PROGRESS NOTES
Physical Therapy  Facility/Department: [facility] C5 - MED SURG/ORTHO  Daily Treatment Note  NAME: Gordon Leroy  : 1940  MRN: [id number]    Date of Service: 2021    Discharge Recommendations:  24 hour supervision or assist, Home with Home health PT   PT Equipment Recommendations  Equipment Needed: Yes  Mobility Devices: Gladies Holes: Rolling    Assessment   Body structures, Functions, Activity limitations: Decreased functional mobility ; Decreased balance; Decreased strength; Decreased safe awareness; Decreased endurance  Assessment: Pt seen for PM PT session this date with focus on gait training and stair training. Pt performs bed mobility with supervision, simple transfers and ambulation with SBA and stairs with CGA and SW. Pt required less verbal cues this session. Pt would benefit from continued skilled PT to address deficits listed above and maximize independence. Recommend home wiht  supervision and HHPT. DME RW  Treatment Diagnosis: impaired functional mobility  Prognosis: Good  Decision Making: Low Complexity  PT Education: Goals; General Safety; Gait Training; Disease Specific Education; PT Role; Functional Mobility Training; Plan of Care; Family Education; Precautions; Transfer Training; Weight-bearing Education  Patient Education: Pt educated on weight bearing precautions, getting in/out of car, provided handout for information- verbalizes understanding; Wife present for education and verbalizes understanding as well  REQUIRES PT FOLLOW UP: Yes  Activity Tolerance  Activity Tolerance: Patient Tolerated treatment well     Patient Diagnosis(es): The primary encounter diagnosis was Primary osteoarthritis of left hip. A diagnosis of Status post total replacement of left hip was also pertinent to this visit.      has a past medical history of Arthritis, Chronic gout without tophus, Diabetes mellitus ([address] ED (erectile dysfunction), Erectile dysfunction, Hyperlipidemia, Hypertension, Nocturia, Pre-diabetes, and Prostate CA ([facility] [address] has a past surgical history that includes Prostate surgery; Tonsillectomy; Knee arthroscopy; knee surgery (Left, 1963); Colonoscopy (9/24/14); joint replacement (Left, 9/1/2015); Cataract removal (Bilateral, 03/2017); and Total hip arthroplasty (Left, 12/13/2021). Restrictions  Restrictions/Precautions  Restrictions/Precautions: Weight Bearing  Lower Extremity Weight Bearing Restrictions  Left Lower Extremity Weight Bearing: Weight Bearing As Tolerated  Position Activity Restriction  Other position/activity restrictions: 1)  Up to bedside chair at least three times a day as tolerated. 2)  Ambulate in room progressing to hallway with assistive device four times daily (including PT) when PT advises. 3)  Bathroom privileges with assistance. Subjective   General  Chart Reviewed: Yes  Response To Previous Treatment: Patient with no complaints from previous session. Family / Caregiver Present: Yes (wife)  Referring Practitioner: Wali Fortune PA-C  Subjective  Subjective: Pt semi-supine in bed upon arrival, agreeable to PT session.   General Comment  Comments: Rn cleared pt for therapy eval  Pain Screening  Patient Currently in Pain: Denies  Vital Signs  Patient Currently in Pain: Denies       Orientation  Orientation  Overall Orientation Status: Within Normal Limits     Objective   Bed mobility  Supine to Sit: Supervision (HOB elevated)  Sit to Supine: Unable to assess (Pt in chair at end of session)  Scooting: Supervision     Transfers  Sit to Stand: Stand by assistance  Stand to sit: Stand by assistance  Comment: with SW, pt demo good carry over from pervious session for hand placement     Ambulation  Ambulation?: Yes  WB Status: WBAT L  Ambulation 1  Surface: level tile  Device: Standard Walker  Assistance: Stand by assistance  Quality of Gait: step-to gait pattern, antalgic, slow alan  Distance: 150 ft  Comments: Pt demo good carryover from previous session for ambulation technique and sequencing  Stairs/Curb  Stairs?: Yes  Stairs  # Steps : 4  Stairs Height: 6\"  Rails: Right ascending  Curbs: 6\"  Device: Hand Held Assist; Standard walker  Assistance: Contact guard assistance  Comment: Pt ascends/descends 4 steps with RHR and HHA on L. Pt ascends/descends 1 curb step with SW. Pt educated on technique up with good/down with bad and demo understanding     Balance  Posture: Good  Sitting - Static: Good  Sitting - Dynamic: Good  Standing - Static: Fair  Standing - Dynamic: Fair        AM-PAC Score  AM-PAC Inpatient Mobility Raw Score : 20 (12/14/21 1557)  AM-PAC Inpatient T-Scale Score : 47.67 (12/14/21 1557)  Mobility Inpatient CMS 0-100% Score: 35.83 (12/14/21 1557)  Mobility Inpatient CMS G-Code Modifier : CJ (12/14/21 1557)        Goals  Short term goals  Time Frame for Short term goals: 7 days (12/21/2021)  Short term goal 1: Pt will performed bed mobility with supervision, HOB flat, no bedrails to simulate home enviornment--12/14 supervision with HOB elevated  Short term goal 2: Pt will perform transfer with LRAD with supervision and no cues for safety--12/14 Goal met  Short term goal 3: Pt will ambulate 50 ft with LRAD and supervision--12/14 150 ft wiht SBA  Short term goal 4: Pt will perform 3 Steps with SBA and LRAD--12/14 CGA  Short term goal 5: Pt will demo understanding of HEP to improve strength and mobility--12/14 progressing  Patient Goals   Patient goals : \"to walk 50 ft without physical assistance\"--Goal met 12/14    Plan    Plan  Times per week: 7x/week BID  Times per day: Twice a day  Current Treatment Recommendations: Strengthening, Neuromuscular Re-education, Home Exercise Program, Safety Education & Training, Patient/Caregiver Education & Training, Endurance Training, Balance Training, Functional Mobility Training, Transfer Training, Stair training, Gait Training, Equipment Evaluation, Education, & procurement  Safety Devices  Type of devices:  All fall risk precautions in place, Left in chair, Nurse notified, Chair alarm in place, Gait belt, Patient at risk for falls, Call light within reach     Therapy Time   Individual Concurrent Group Co-treatment   Time In 1422         Time Out 1500         Minutes 38         Timed Code Treatment Minutes: 38 Minutes     If pt is unable to be seen after this session, please let this note serve as discharge summary. Please see case management note for discharge disposition. Thank you.     Lady Negron, PT

## 2021-12-14 NOTE — PROGRESS NOTES
Physician Progress Note      PATIENT:               Maricarmen Leiva  CSN #:                  [id number]  :                       1940  ADMIT DATE:       2021 8:40 AM  DISCH DATE:  RESPONDING  PROVIDER #:        Enrico LEYVA - MARAH          QUERY TEXT:    Patient admitted with osteoarthritis of hip and s/p left hip arthroplasty,   noted to have atrial fibrillation. If possible, please document in progress   notes and discharge summary further specificity regarding the type of atrial   fibrillation: The medical record reflects the following:  Risk Factors: Osteoarthitis, HTN, prostate cancer  Clinical Indicators: History of chronic afib on previous medical history,   hospitalist consult note \"A. Fib -stable Continue atenolol and Eliquis\"  Treatment: Hospitalist consult, Eliquis, atenolol, serial labs, supportive   care      Chronic: nonspecific term that could be referring to paroxysmal, persistent,   or permanent  Paroxysmal - self-terminating or intermittent; resolves with or without   intervention within 7 days of onset; may recur with various frequency. Persistent - Fails to terminate within 7 days; Often requires meds or   cardioversion to restore to NSR. Permanent - longstanding & persistent; Medication has been ineffective in   restoring NSR &/or cardioversion is contraindicated    Definitions per MS-DRG Training Guide and Quick Reference Guide, [facility] 5   Diseases and Disorders of the Circulatory System; 2019; [employer]. Software content   from the [employer]? Advanced [employer] Transformation Program    Thank you,  [email or web address]  Options provided:  -- Chronic Atrial Fibrillation, unspecified  -- Paroxysmal Atrial Fibrillation  -- Permanent Atrial Fibrillation  -- Persistent Atrial Fibrillation  -- Other - I will add my own diagnosis  -- Disagree - Not applicable / Not valid  -- Disagree - Clinically unable to determine / Unknown  -- Refer to Clinical Documentation Reviewer    PROVIDER RESPONSE TEXT:    This patient has persistent atrial fibrillation.     Query created by: Leilani Devi on 12/14/2021 1:23 PM      Electronically signed by:  Josh Watson CNP 12/14/2021 2:50 PM

## 2021-12-15 ENCOUNTER — TELEPHONE (OUTPATIENT)
Dept: FAMILY MEDICINE CLINIC | Age: 81
End: 2021-12-15

## 2021-12-15 ENCOUNTER — TELEPHONE (OUTPATIENT)
Dept: ORTHOPEDIC SURGERY | Age: 81
End: 2021-12-15

## 2021-12-15 NOTE — TELEPHONE ENCOUNTER
Spoke with pt and spouse. Pt doing pretty well. Having more pain and swelling today than yesterday. Getting up and walking with walker. Incision status: No drainage or redness    Edema/Swelling/Teds: Swelling has gone down overnight. Discussed an educated about icing and elevating. Pain level and status: Managing ok, having some pain. Use of pain medications: Using 1 kristi every 6 hours, discussed adding in tylenol every 4 hours. Pt agreeable. Blood thinner: Eliquis BID- no issues. Bowels: Taking a stool softener, discussed using a laxative if needed. Passing gas. Home Care Agency active: NA    Outpatient therapy: NA    Do you have all of your medications: Yes    Changes in medications: no    Ortho Vitals: NA    Instructed pt to call Nurse Navigator or surgeon's office with any questions or concerns.      Follow up appointments:    Future Appointments   Date Time Provider Jeb Pedro   12/30/2021 10:15 AM Nicholas Plata MD AND [facility]   3/7/2022  9:15 AM DO TEE Juan

## 2021-12-15 NOTE — TELEPHONE ENCOUNTER
[facility]manda [facility] Transitions Initial Follow Up Call    Outreach made within 2 business days of discharge: Yes    Patient: Melvin Chapa Patient : 1940   MRN: [id number]  Reason for Admission: There are no discharge diagnoses documented for the most recent discharge. Discharge Date: 21       Spoke with: Patient had surgery, has post op scheduled. No follow up needed with PCP     Discharge department/facility: [facility]     TCM Interactive Patient Contact:  Was patient able to fill all prescriptions: Yes  Was patient instructed to bring all medications to the follow-up visit: Yes  Is patient taking all medications as directed in the discharge summary?  Yes  Does patient understand their discharge instructions: Yes  Does patient have questions or concerns that need addressed prior to 7-14 day follow up office visit: no    Scheduled appointment with PCP within 7-14 days    Follow Up  Future Appointments   Date Time Provider Jeb Pedro   2021 10:15 AM Robbin Bates MD AND ORTH[facility]   3/7/2022  9:15 AM DO TEE Juan       [facility]

## 2021-12-20 ENCOUNTER — TELEPHONE (OUTPATIENT)
Dept: ORTHOPEDIC SURGERY | Age: 81
End: 2021-12-20

## 2021-12-20 NOTE — TELEPHONE ENCOUNTER
General Question     Subject: Patients wife states the battery has not  on the device and they are anxious about the bandage and showering. Please advise.   Patient's wife: Zohra Screws Number: [phone]

## 2021-12-21 ENCOUNTER — TELEPHONE (OUTPATIENT)
Dept: ORTHOPEDIC SURGERY | Age: 81
End: 2021-12-21

## 2021-12-21 NOTE — TELEPHONE ENCOUNTER
Spoke with pt and spouse, doing really well. Pt would like to take shower today. Incision status: No drainage or redness, discussed and educated about dressing change. Edema/Swelling/Teds: Not bad. Pain level and status: Managing pretty well. Use of pain medications: Using tylenol and managing well. Blood thinner: Eliquis BID- no issues. Bowels: Moving fine, no issues. Home Care Agency active: NA    Outpatient therapy: NA    Do you have all of your medications: Yes    Changes in medications: no    Ortho Vitals: NA    Instructed pt to call Nurse Navigator or surgeon's office with any questions or concerns. Signed off from pt. Instructed pt to call RN or Surgeon's office with any issues or concerns.     Follow up appointments:    Future Appointments   Date Time Provider Jeb Pedro   12/30/2021 10:15 AM Al Roth MD AND [facility]   3/7/2022  9:15 AM DO TEE Juan  [facility] JOSUE

## 2021-12-30 ENCOUNTER — OFFICE VISIT (OUTPATIENT)
Dept: ORTHOPEDIC SURGERY | Age: 81
End: 2021-12-30

## 2021-12-30 DIAGNOSIS — Z96.642 HISTORY OF TOTAL HIP ARTHROPLASTY, LEFT: Primary | ICD-10-CM

## 2021-12-30 PROCEDURE — 99024 POSTOP FOLLOW-UP VISIT: CPT | Performed by: ORTHOPAEDIC SURGERY

## 2021-12-30 RX ORDER — OXYCODONE HYDROCHLORIDE 5 MG/1
5 TABLET ORAL EVERY 6 HOURS PRN
Qty: 28 TABLET | Refills: 0 | Status: SHIPPED | OUTPATIENT
Start: 2021-12-30 | End: 2022-01-06

## 2021-12-30 RX ORDER — DOXYCYCLINE HYCLATE 100 MG
100 TABLET ORAL 2 TIMES DAILY
Qty: 20 TABLET | Refills: 0 | Status: SHIPPED | OUTPATIENT
Start: 2021-12-30 | End: 2022-01-09

## 2021-12-30 RX ORDER — OXYCODONE HYDROCHLORIDE 5 MG/1
5 TABLET ORAL EVERY 6 HOURS PRN
Qty: 28 TABLET | Refills: 0 | Status: CANCELLED | OUTPATIENT
Start: 2021-12-30 | End: 2022-01-06

## 2021-12-30 NOTE — PROGRESS NOTES
Dr Al Banks      Date /Time 12/30/2021       10:19 AM EST  Name Herbert Sullivan             1940   Location  [facility]  MRN [id number]                Chief Complaint   Patient presents with    Follow-up     PO Left total hip replacement sx 12/13/2021        History of Present Illness      Herbert Sullivan is a [de-identified] y.o. male is here for post-op visit after LEFT  75489 Total Hip Arthroplasty      Patient is 3 weeks status post left anterior total hip arthroplasty. Pain controlled. Patient denies fever or chills. Physical Exam    Based off 1997 Exam Criteria    There were no vitals taken for this visit. Constitutional:       General: He is not in acute distress. Appearance: Normal appearance. LEFT Hip: incision intact. Small amount of greenish discharge on the dressing. Once dressing removed incision clean, dry, and intact. No surrounding  erythema or fluctuance. Neuro intact distal. No evidence of DVT. Imaging       Left Hip: [facility] [place]  Radiographs: X-rays were ordered today reviewed of the left hip.  3 views. AP pelvis, lateral, and false profile. They have a a well-maintained total hip arthroplasty. No evidence of loosening or periprosthetic fracture. Assessment and Plan    Nai Hannah was seen today for follow-up. Diagnoses and all orders for this visit:    History of total hip arthroplasty, left  -     XR HIP LEFT (2-3 VIEWS); Future        I will place the patient on doxycycline purely prophylactically. He will watch for any further drainage. I would like to check his incision in 2 weeks to ensure there are no other issues before he starts physical therapy or sooner if problems arise. I have refilled his pain medication. Electronically signed by Al Banks MD on 12/30/2021 at 10:19 AM  This dictation was generated by voice recognition computer software.   Although all attempts are made to edit the dictation for accuracy, there may be errors in the transcription that are not intended.

## 2022-01-12 ENCOUNTER — OFFICE VISIT (OUTPATIENT)
Dept: ORTHOPEDIC SURGERY | Age: 82
End: 2022-01-12

## 2022-01-12 VITALS — BODY MASS INDEX: 33.92 KG/M2 | WEIGHT: 229 LBS | HEIGHT: 69 IN

## 2022-01-12 DIAGNOSIS — Z96.642 HISTORY OF TOTAL HIP ARTHROPLASTY, LEFT: Primary | ICD-10-CM

## 2022-01-12 PROCEDURE — 99024 POSTOP FOLLOW-UP VISIT: CPT | Performed by: PHYSICIAN ASSISTANT

## 2022-01-12 NOTE — PROGRESS NOTES
Dr Tara Pedroza      Date /Time 1/12/2022       10:19 AM EST  Name Kraig Howell             1940   Location  [facility]  MRN [id number]                Chief Complaint   Patient presents with    Follow-up     1st Po Left HAMLET SX 12/13/2021        History of Present Illness      Kraig Howell is a [de-identified] y.o. male is here for post-op visit after LEFT  13567 Total Hip Arthroplasty      Patient is 4 weeks status post left anterior total hip arthroplasty. Pain controlled. Patient denies fever or chills. Patient denies any drainage. Physical Exam    Based off 1997 Exam Criteria    Ht 5' 9\" (1.753 m)   Wt 229 lb (103.9 kg)   BMI 33.82 kg/m²      Constitutional:       General: He is not in acute distress. Appearance: Normal appearance. LEFT Hip: incision clean dry and intact. No surrounding  erythema or fluctuance. Neuro intact distal. No evidence of DVT. Imaging           Assessment and Plan    Harriett High was seen today for follow-up. Diagnoses and all orders for this visit:    History of total hip arthroplasty, left  -     XR HIP LEFT (2-3 VIEWS); Future  -     Ambulatory referral to Physical Therapy        Patient is doing well. Any residual wound issues have completely resolved. He can continue weightbearing as tolerated. He will start physical therapy next week and follow-up in 3 months. He will follow-up sooner if any problems arise. Electronically signed by Jeannette Vargas PA-C on 1/12/2022 at 11:59 AM  This dictation was generated by voice recognition computer software. Although all attempts are made to edit the dictation for accuracy, there may be errors in the transcription that are not intended.

## 2022-01-13 ENCOUNTER — HOSPITAL ENCOUNTER (OUTPATIENT)
Dept: PHYSICAL THERAPY | Age: 82
Setting detail: THERAPIES SERIES
Discharge: HOME | End: 2022-01-13
Payer: MEDICARE

## 2022-01-13 PROCEDURE — 97140 MANUAL THERAPY 1/> REGIONS: CPT

## 2022-01-13 PROCEDURE — 97161 PT EVAL LOW COMPLEX 20 MIN: CPT

## 2022-01-13 PROCEDURE — 97110 THERAPEUTIC EXERCISES: CPT

## 2022-01-13 NOTE — FLOWSHEET NOTE
[facility],  [address]  Phone: [phone]  Fax [phone]    Physical Therapy Treatment Note/ Progress Report:           Date:  2022    Patient Name:  Bhavna Latif    :  1940  MRN: [id number]  Restrictions/Precautions:    Medical/Treatment Diagnosis Information:  Diagnosis: M16.12 (ICD-10-CM) - Primary osteoarthritis of left hip  Treatment Diagnosis: left hip pain L57.103  Insurance/Certification information:  PT Insurance Information: [facility]  Physician Information:  Referring Practitioner: Dr. Nikhil Estrella  Has the plan of care been signed (Y/N):        []  Yes  [x]  No     Date of Patient follow up with Physician: 22      Is this a Progress Report:     []  Yes  [x]  No        If Yes:  Date Range for reporting period:  Beginning 22  Ending    Progress report will be due (10 Rx or 30 days whichever is less): 7/81/15       Recertification will be due (POC Duration  / 90 days whichever is less): 22      Visit # Insurance Allowable Auth Required   In-person 1 BMN [x]  No        Functional Scale: LEFS:  66% Disability    Date assessed:  22      Number of Comorbidities:  []0     [x]1-2    []3+    Latex Allergy:  [x]NO      []YES  Preferred Language for Healthcare:   [x]English       []other:      Pain level:  0-2/10     SUBJECTIVE:  See eval    OBJECTIVE: See eval   Observation:    Test measurements:      RESTRICTIONS/PRECAUTIONS: L HAMLET 21    Exercises/Interventions:     Therapeutic Ex (97619)/NMR re-education (68006) Sets/sec Notes/CUES   Hooklying Clams  Supine Bridge x10  x12 RTB  RTB   Standing March  Standing Calf Raise  Sit to Stand x10 R/L  x15  2x5                        Pt Ed: POC, HEP, Role of PT, anatomy of injury, typical progressions 15'    Manual Intervention (10179)     STM to hip, PROM, Circumduction 10'                          HEP instruction: Access Code: [email or web address]  URL: [email or web address]  Date: 01/13/2022  Prepared by: Amber Bartholomew     Exercises  Hooklying Isometric Clamshell - 1 x daily - 2 x weekly - 3 sets - 10 reps  Supine Bridge with Resistance Band - 1 x daily - 2 x weekly - 3 sets - 10 reps  Standing March with Counter Support - 1 x daily - 2 x weekly - 3 sets - 10 reps  Heel rises with counter support - 1 x daily - 2 x weekly - 3 sets - 10 reps  Sit to Stand - 1 x daily - 2 x weekly - 3 sets - 10 reps    Therapeutic Exercise and NMR EXR  [x] (29753) Provided verbal/tactile cueing for activities related to strengthening, flexibility, endurance, ROM for improvements in LE, proximal hip, and core control with self care, mobility, lifting, ambulation. [x] (86995) Provided verbal/tactile cueing for activities related to improving balance, coordination, kinesthetic sense, posture, motor skill, proprioception  to assist with LE, proximal hip, and core control in self care, mobility, lifting, ambulation and eccentric single leg control.      NMR and Therapeutic Activities:    [x] (42757 or 90034) Provided verbal/tactile cueing for activities related to improving balance, coordination, kinesthetic sense, posture, motor skill, proprioception and motor activation to allow for proper function of core, proximal hip and LE with self care and ADLs  [x] (03432) Gait Re-education- Provided training and instruction to the patient for proper LE, core and proximal hip recruitment and positioning and eccentric body weight control with ambulation re-education including up and down stairs     Home Exercise Program:    [x] (77379) Reviewed/Progressed HEP activities related to strengthening, flexibility, endurance, ROM of core, proximal hip and LE for functional self-care, mobility, lifting and ambulation/stair navigation   [] (34406)Reviewed/Progressed HEP activities related to improving balance, coordination, kinesthetic sense, posture, motor skill, proprioception of core, proximal hip and LE for self care, mobility, lifting, and ambulation/stair navigation      Manual Treatments:  PROM / STM / Oscillations-Mobs:  G-I, II, III, IV (PA's, Inf., Post.)  [x] (44324) Provided manual therapy to mobilize LE, proximal hip and/or LS spine soft tissue/joints for the purpose of modulating pain, promoting relaxation,  increasing ROM, reducing/eliminating soft tissue swelling/inflammation/restriction, improving soft tissue extensibility and allowing for proper ROM for normal function with self care, mobility, lifting and ambulation. Modalities:     [] GAME READY (VASO)- for significant edema, swelling, pain control. Charges  Timed Code Treatment Minutes: 40   Total Treatment Minutes: 45       [x] EVAL (LOW) 41586   [] EVAL (MOD) 83579  [] EVAL (HIGH) 86179   [] RE-EVAL     [x] ZC(60391) x 2    [] IONTO  [] NMR (88511) x     [] VASO  [x] Manual (04373) x 1     [] Other:  [] TA x      [] Mech Traction (24911)  [] ES(attended) (28082)      [] ES (un) (60059):       GOALS:  Patient stated goal: To get back to being able to walk his dog and ascend/descend his basement steps. [] Progressing: [] Met: [] Not Met: [] Adjusted    Therapist goals for Patient:   Short Term Goals: To be achieved in: 2 weeks  1. Independent in HEP and progression per patient tolerance, in order to prevent re-injury. [] Progressing: [] Met: [] Not Met: [] Adjusted  2. Patient will have a decrease in pain to facilitate improvement in movement, function, and ADLs as indicated by Functional Deficits. [] Progressing: [] Met: [] Not Met: [] Adjusted    Long Term Goals: To be achieved in: 12 weeks  1. Disability index score of 30% or less for the LEFS to assist with reaching prior level of function. [] Progressing: [] Met: [] Not Met: [] Adjusted  2.  Patient will demonstrate increased AROM hip flexion to 90 degrees to allow for proper joint functioning as indicated by patients Functional Deficits. [] Progressing: [] Met: [] Not Met: [] Adjusted  3. Patient will demonstrate an increase in Strength to good proximal hip strength and control, equal bilaterally by MMT in LE to allow for proper functional mobility as indicated by patients Functional Deficits. [] Progressing: [] Met: [] Not Met: [] Adjusted  4. Patient will return to all household ADL's and dog walking functional activities without increased symptoms or restriction. [] Progressing: [] Met: [] Not Met: [] Adjusted  5. Patient will report ability to ascend/descend basement steps without issue or restriction. [] Progressing: [] Met: [] Not Met: [] Adjusted    Overall Progression Towards Functional goals/ Treatment Progress Update:  [] Patient is progressing as expected towards functional goals listed. [] Progression is slowed due to complexities/Impairments listed. [] Progression has been slowed due to co-morbidities. [x] Plan just implemented, too soon to assess goals progression <30days   [] Goals require adjustment due to lack of progress  [] Patient is not progressing as expected and requires additional follow up with physician  [] Other    Prognosis for POC: [x] Good [] Fair  [] Poor      Patient requires continued skilled intervention: [x] Yes  [] No    Treatment/Activity Tolerance:  [x] Patient able to complete treatment  [] Patient limited by fatigue  [] Patient limited by pain    [] Patient limited by other medical complications  [] Other:     ASSESSMENT: See eval.       PLAN: See eval  [] Continue per plan of care [] Alter current plan (see comments above)  [x] Plan of care initiated [] Hold pending MD visit [] Discharge      Electronically signed by:  Manjeet Pearce PT    Note: If patient does not return for scheduled/ recommended follow up visits, this note will serve as a discharge from care along with most recent update on progress.

## 2022-01-13 NOTE — PLAN OF CARE
[facility], [address] [place] [address]  Phone: [phone]  Fax [phone]     Physical Therapy Certification    Dear Referring Practitioner: Dr. Baltazar Fotrune,    We had the pleasure of evaluating the following patient for physical therapy services at [address] [place] [address]. A summary of our findings can be found in the initial assessment below. This includes our plan of care. If you have any questions or concerns regarding these findings, please do not hesitate to contact me at the office phone number checked above. Thank you for the referral.       Physician Signature:_______________________________Date:__________________  By signing above (or electronic signature), therapists plan is approved by physician    Patient: Donna Jacques   : 1940   MRN: [id number]  Referring Physician: Referring Practitioner: Dr. Baltazar Fortune      Evaluation Date: 2022      Medical Diagnosis Information:  Diagnosis: M16.12 (ICD-10-CM) - Primary osteoarthritis of left hip   Treatment Diagnosis: left hip pain M25.552                                         Insurance information: PT [facility] Information[facility]       Precautions/ Contra-indications: L HAMLET 21    C-SSRS Triggered by Intake questionnaire (Past 2 wk assessment):   [x] No, Questionnaire did not trigger screening.   [] Yes, Patient intake triggered further evaluation      [] C-SSRS Screening completed  [] PCP notified via Plan of Care  [] Emergency services notified     Latex Allergy:  [x]NO      []YES  Preferred Language for Healthcare:   [x]English       []other:    SUBJECTIVE: Patient stated complaint:Patient reports that he is about a month out of L HAMLET. Not taking any pain meds at this point, sleeping well at this point, moving well around home. Has not been able to get up and down stairs really at this point.  His basement has 12 stairs and he has not tried that yet. Relevant Medical History:L TKA 6 years ago  Functional Disability Index:LEFS 27/80     Pain Scale: 0-2/10    Type: [x]Constant   []Intermittent  []Radiating [x]Localized []other:     Numbness/Tingling: None of note    Occupation/School: Retired    Living Status/Prior Level of Function: Independent with ADLs and IADLs    OBJECTIVE:     ROM LEFT RIGHT   HIP Flex 90  WNL   HIP Abd NT NT   Knee ext WNL WNL   Knee Flex WNL WNL   Strength  LEFT RIGHT   HIP Flexors 3- 3+   HIP Abductors 3+ 3+   Hip ER 3+ 4+   Knee EXT (quad) 3+ 4+   Knee Flex (HS) 3+ 4+     Reflexes/Sensation:    [x]Dermatomes/Myotomes intact    []Other:    Joint mobility:    [x]Normal    []Hypo   []Hyper    Palpation: Globally TTP around hip musculature     Bandages/Dressings/Incisions: Incision in good signs of healing    Gait: (include devices/WB status) WBAT, slight limp - non painful    Orthopedic Special Tests: None of note                       [x] Patient history, allergies, meds reviewed. Medical chart reviewed. See intake form. Review Of Systems (ROS):  [x]Performed Review of systems (Integumentary, CardioPulmonary, Neurological) by intake and observation. Intake form has been scanned into medical record. Patient has been instructed to contact their primary care physician regarding ROS issues if not already being addressed at this time.       Co-morbidities/Complexities (which will affect course of rehabilitation):   []None           Arthritic conditions   []Rheumatoid arthritis (M05.9)  []Osteoarthritis (M19.91)   Cardiovascular conditions   [x]Hypertension (I10)  []Hyperlipidemia (E78.5)  []Angina pectoris (I20)  []Atherosclerosis (I70)   Musculoskeletal conditions   []Disc pathology   []Congenital spine pathologies   []Prior surgical intervention  []Osteoporosis (M81.8)  []Osteopenia (M85.8)   Endocrine conditions   []Hypothyroid (E03.9)  []Hyperthyroid Gastrointestinal conditions   []Constipation (K59.00) Metabolic conditions   []Morbid obesity (E66.01)  [x]Diabetes type 1(E10.65) or 2 (E11.65)   []Neuropathy (G60.9)     Pulmonary conditions   []Asthma (J45)  []Coughing   []COPD (J44.9)   Psychological Disorders  []Anxiety (F41.9)  []Depression (F32.9)   []Other:   []Other:          Barriers to/and or personal factors that will affect rehab potential:              [x]Age  []Sex              []Motivation/Lack of Motivation                        [x]Co-Morbidities              []Cognitive Function, education/learning barriers              []Environmental, home barriers              []profession/work barriers  []past PT/medical experience  []other:  Justification: older gentleman with diabetes, will likely prolong prognosis    Falls Risk Assessment (30 days):   [x] Falls Risk assessed and no intervention required.   [] Falls Risk assessed and Patient requires intervention due to being higher risk   TUG score (>12s at risk):     [] Falls education provided, including           ASSESSMENT:   Functional Impairments:     [x]Noted lumbar/proximal hip/LE joint hypomobility   [x]Decreased LE functional ROM   [x]Decreased core/proximal hip strength and neuromuscular control   [x]Decreased LE functional strength   [x]Reduced balance/proprioceptive control   []other:      Functional Activity Limitations (from functional questionnaire and intake)   [x]Reduced ability to tolerate prolonged functional positions   [x]Reduced ability or difficulty with changes of positions or transfers between positions   [x]Reduced ability to maintain good posture and demonstrate good body mechanics with sitting, bending, and lifting   [x]Reduced ability to sleep   [x] Reduced ability or tolerance with driving and/or computer work   [x]Reduced ability to perform lifting, carrying tasks   [x]Reduced ability to squat   [x]Reduced ability to forward bend   [x]Reduced ability to ambulate prolonged functional periods/distances/surfaces   [x]Reduced ability to ascend/descend stairs   [x]Reduced ability to run, hop, cut or jump   []other:    Participation Restrictions   [x]Reduced participation in self care activities   [x]Reduced participation in home management activities   [x]Reduced participation in work activities   [x]Reduced participation in social activities. [x]Reduced participation in sport/recreation activities. Classification :    [x]Signs/symptoms consistent with post-surgical status including decreased ROM, strength and function.    []Signs/symptoms consistent with joint sprain/strain   []Signs/symptoms consistent with patella-femoral syndrome   []Signs/symptoms consistent with knee OA/hip OA   []Signs/symptoms consistent with internal derangement of knee/Hip   []Signs/symptoms consistent with functional hip weakness/NMR control      []Signs/symptoms consistent with tendinitis/tendinosis    []signs/symptoms consistent with pathology which may benefit from Dry needling      []other:      Prognosis/Rehab Potential:      []Excellent   [x]Good    []Fair   []Poor    Tolerance of evaluation/treatment:    []Excellent   [x]Good    []Fair   []Poor    Physical Therapy Evaluation Complexity Justification  [x] A history of present problem with:  [] no personal factors and/or comorbidities that impact the plan of care;  [x]1-2 personal factors and/or comorbidities that impact the plan of care  []3 personal factors and/or comorbidities that impact the plan of care  [x] An examination of body systems using standardized tests and measures addressing any of the following: body structures and functions (impairments), activity limitations, and/or participation restrictions;:  [x] a total of 1-2 or more elements   [] a total of 3 or more elements   [] a total of 4 or more elements   [x] A clinical presentation with:  [x] stable and/or uncomplicated characteristics   [] evolving clinical presentation with changing characteristics  [] unstable and unpredictable characteristics;   [x] Clinical decision making of [x] low, [] moderate, [] high complexity using standardized patient assessment instrument and/or measurable assessment of functional outcome. [x] EVAL (LOW) 45402 (typically 20 minutes face-to-face)  [] EVAL (MOD) 41443 (typically 30 minutes face-to-face)  [] EVAL (HIGH) 40006 (typically 45 minutes face-to-face)  [] RE-EVAL       PLAN:   Frequency/Duration:  1-2 days per week for 12 Weeks:  Interventions:  [x]  Therapeutic exercise including: strength training, ROM, for Lower extremity and core   [x]  NMR activation and proprioception for LE, Glutes and Core   [x]  Manual therapy as indicated for LE, Hip and spine to include: Dry Needling/IASTM, STM, PROM, Gr I-IV mobilizations, manipulation. [x] Modalities as needed that may include: thermal agents, E-stim, Biofeedback, US, iontophoresis as indicated  [x] Patient education on joint protection, postural re-education, activity modification, progression of HEP. HEP instruction: Access Code: [email or web address]  URL: [email or web address]  Date: 01/13/2022  Prepared by: Theresa Goldberg    Exercises  Hooklying Isometric Clamshell - 1 x daily - 2 x weekly - 3 sets - 10 reps  Supine Bridge with Resistance Band - 1 x daily - 2 x weekly - 3 sets - 10 reps  Standing March with Counter Support - 1 x daily - 2 x weekly - 3 sets - 10 reps  Heel rises with counter support - 1 x daily - 2 x weekly - 3 sets - 10 reps  Sit to Stand - 1 x daily - 2 x weekly - 3 sets - 10 reps      GOALS:  Patient stated goal: To get back to being able to walk his dog and ascend/descend his basement steps. [] Progressing: [] Met: [] Not Met: [] Adjusted    Therapist goals for Patient:   Short Term Goals: To be achieved in: 2 weeks  1. Independent in HEP and progression per patient tolerance, in order to prevent re-injury. [] Progressing: [] Met: [] Not Met: [] Adjusted  2.  Patient will have a decrease in pain to facilitate improvement in movement, function, and ADLs as indicated by Functional Deficits. [] Progressing: [] Met: [] Not Met: [] Adjusted    Long Term Goals: To be achieved in: 12 weeks  1. Disability index score of 30% or less for the LEFS to assist with reaching prior level of function. [] Progressing: [] Met: [] Not Met: [] Adjusted  2. Patient will demonstrate increased AROM hip flexion to 90 degrees to allow for proper joint functioning as indicated by patients Functional Deficits. [] Progressing: [] Met: [] Not Met: [] Adjusted  3. Patient will demonstrate an increase in Strength to good proximal hip strength and control, equal bilaterally by MMT in LE to allow for proper functional mobility as indicated by patients Functional Deficits. [] Progressing: [] Met: [] Not Met: [] Adjusted  4. Patient will return to all household ADL's and dog walking functional activities without increased symptoms or restriction. [] Progressing: [] Met: [] Not Met: [] Adjusted  5. Patient will report ability to ascend/descend basement steps without issue or restriction.   [] Progressing: [] Met: [] Not Met: [] Adjusted     Electronically signed by:  Anthony Orellana, PT

## 2022-01-18 ENCOUNTER — HOSPITAL ENCOUNTER (OUTPATIENT)
Dept: PHYSICAL THERAPY | Age: 82
Setting detail: THERAPIES SERIES
Discharge: HOME | End: 2022-01-18
Payer: MEDICARE

## 2022-01-18 PROCEDURE — 97140 MANUAL THERAPY 1/> REGIONS: CPT

## 2022-01-18 PROCEDURE — 97110 THERAPEUTIC EXERCISES: CPT

## 2022-01-18 NOTE — FLOWSHEET NOTE
[facility],  [address]  Phone: [phone]  Fax [phone]    Physical Therapy Treatment Note/ Progress Report:           Date:  2022    Patient Name:  Damian Diamond    :  1940  MRN: [id number]  Restrictions/Precautions:    Medical/Treatment Diagnosis Information:  Diagnosis: M16.12 (ICD-10-CM) - Primary osteoarthritis of left hip  Treatment Diagnosis: left hip pain Y61.218  Insurance/Certification information:  PT Insurance Information: [facility]  Physician Information:  Referring Practitioner: Dr. Thomas Carter  Has the plan of care been signed (Y/N):        []  Yes  [x]  No     Date of Patient follow up with Physician: 22      Is this a Progress Report:     []  Yes  [x]  No        If Yes:  Date Range for reporting period:  Beginning 22  Ending    Progress report will be due (10 Rx or 30 days whichever is less):        Recertification will be due (POC Duration  / 90 days whichever is less): 22      Visit # Insurance Allowable Auth Required   In-person 2 BMN [x]  No        Functional Scale: LEFS:  66% Disability    Date assessed:  22      Number of Comorbidities:  []0     [x]1-2    []3+    Latex Allergy:  [x]NO      []YES  Preferred Language for Healthcare:   [x]English       []other:      Pain level:  0-2/10     SUBJECTIVE:  See eval    OBJECTIVE: See eval   Observation:    Test measurements:      RESTRICTIONS/PRECAUTIONS: L HAMLET 21    Exercises/Interventions:     Therapeutic Ex (49238)/NMR re-education (10347) Sets/sec Notes/CUES   Hooklying Clams  Supine Bridge x10  x12 RTB  RTB   Standing March  Standing Calf Raise  Sit to Stand x10 R/L  x15  2x10     8#        FSU  LSU  CC resisted walking 2x10  2x10  x10 f/b  x5 s/s 6\"  6\"  4 plates  2 plates             Pt Ed: POC, HEP, Role of PT, anatomy of injury, typical progressions 15'    Manual Intervention (13373) STM to hip, PROM, Circumduction 10'                          HEP instruction: Access Code: [email or web address]  URL: [email or web address]  Date: 01/13/2022  Prepared by: Luis Fernando Schmidt     Exercises  Hooklying Isometric Clamshell - 1 x daily - 2 x weekly - 3 sets - 10 reps  Supine Bridge with Resistance Band - 1 x daily - 2 x weekly - 3 sets - 10 reps  Standing March with Counter Support - 1 x daily - 2 x weekly - 3 sets - 10 reps  Heel rises with counter support - 1 x daily - 2 x weekly - 3 sets - 10 reps  Sit to Stand - 1 x daily - 2 x weekly - 3 sets - 10 reps    Therapeutic Exercise and NMR EXR  [x] (25087) Provided verbal/tactile cueing for activities related to strengthening, flexibility, endurance, ROM for improvements in LE, proximal hip, and core control with self care, mobility, lifting, ambulation. [x] (44886) Provided verbal/tactile cueing for activities related to improving balance, coordination, kinesthetic sense, posture, motor skill, proprioception  to assist with LE, proximal hip, and core control in self care, mobility, lifting, ambulation and eccentric single leg control.      NMR and Therapeutic Activities:    [x] (94196 or 99913) Provided verbal/tactile cueing for activities related to improving balance, coordination, kinesthetic sense, posture, motor skill, proprioception and motor activation to allow for proper function of core, proximal hip and LE with self care and ADLs  [x] (46745) Gait Re-education- Provided training and instruction to the patient for proper LE, core and proximal hip recruitment and positioning and eccentric body weight control with ambulation re-education including up and down stairs     Home Exercise Program:    [x] (63129) Reviewed/Progressed HEP activities related to strengthening, flexibility, endurance, ROM of core, proximal hip and LE for functional self-care, mobility, lifting and ambulation/stair navigation   [] (96577)Reviewed/Progressed HEP activities related to improving balance, coordination, kinesthetic sense, posture, motor skill, proprioception of core, proximal hip and LE for self care, mobility, lifting, and ambulation/stair navigation      Manual Treatments:  PROM / STM / Oscillations-Mobs:  G-I, II, III, IV (PA's, Inf., Post.)  [x] (80271) Provided manual therapy to mobilize LE, proximal hip and/or LS spine soft tissue/joints for the purpose of modulating pain, promoting relaxation,  increasing ROM, reducing/eliminating soft tissue swelling/inflammation/restriction, improving soft tissue extensibility and allowing for proper ROM for normal function with self care, mobility, lifting and ambulation. Modalities:     [] GAME READY (VASO)- for significant edema, swelling, pain control. Charges  Timed Code Treatment Minutes: 40   Total Treatment Minutes: 40       [] EVAL (LOW) 09735   [] EVAL (MOD) 41723  [] EVAL (HIGH) 97954   [] RE-EVAL     [x] VU(75964) x 2    [] IONTO  [] NMR (40858) x     [] VASO  [x] Manual (41419) x 1     [] Other:  [] TA x      [] Mech Traction (15723)  [] ES(attended) (93720)      [] ES (un) (74015):       GOALS:  Patient stated goal: To get back to being able to walk his dog and ascend/descend his basement steps. [] Progressing: [] Met: [] Not Met: [] Adjusted    Therapist goals for Patient:   Short Term Goals: To be achieved in: 2 weeks  1. Independent in HEP and progression per patient tolerance, in order to prevent re-injury. [] Progressing: [] Met: [] Not Met: [] Adjusted  2. Patient will have a decrease in pain to facilitate improvement in movement, function, and ADLs as indicated by Functional Deficits. [] Progressing: [] Met: [] Not Met: [] Adjusted    Long Term Goals: To be achieved in: 12 weeks  1. Disability index score of 30% or less for the LEFS to assist with reaching prior level of function. [] Progressing: [] Met: [] Not Met: [] Adjusted  2.  Patient will demonstrate increased AROM hip flexion to 90 degrees to allow for proper joint functioning as indicated by patients Functional Deficits. [] Progressing: [] Met: [] Not Met: [] Adjusted  3. Patient will demonstrate an increase in Strength to good proximal hip strength and control, equal bilaterally by MMT in LE to allow for proper functional mobility as indicated by patients Functional Deficits. [] Progressing: [] Met: [] Not Met: [] Adjusted  4. Patient will return to all household ADL's and dog walking functional activities without increased symptoms or restriction. [] Progressing: [] Met: [] Not Met: [] Adjusted  5. Patient will report ability to ascend/descend basement steps without issue or restriction. [] Progressing: [] Met: [] Not Met: [] Adjusted    Overall Progression Towards Functional goals/ Treatment Progress Update:  [] Patient is progressing as expected towards functional goals listed. [] Progression is slowed due to complexities/Impairments listed. [] Progression has been slowed due to co-morbidities. [x] Plan just implemented, too soon to assess goals progression <30days   [] Goals require adjustment due to lack of progress  [] Patient is not progressing as expected and requires additional follow up with physician  [] Other    Prognosis for POC: [x] Good [] Fair  [] Poor      Patient requires continued skilled intervention: [x] Yes  [] No    Treatment/Activity Tolerance:  [x] Patient able to complete treatment  [] Patient limited by fatigue  [] Patient limited by pain    [] Patient limited by other medical complications  [] Other:     ASSESSMENT:Anup tolerated session well, without any reported issues. We did introduce FSU as well as LSU without any issues. We did adjust his form and height for sit to stands at home due to some reported groin pain completing his HEP. He would continue to benefit from skilled physical therapy to maximize his functional outcomes and progress towards goals.        PLAN: See jesusita  [] Continue per plan of care [] Micky Saeed current plan (see comments above)  [x] Plan of care initiated [] Hold pending MD visit [] Discharge      Electronically signed by:  Jeff Mahoney PT    Note: If patient does not return for scheduled/ recommended follow up visits, this note will serve as a discharge from care along with most recent update on progress.

## 2022-01-20 ENCOUNTER — HOSPITAL ENCOUNTER (OUTPATIENT)
Dept: PHYSICAL THERAPY | Age: 82
Setting detail: THERAPIES SERIES
Discharge: HOME | End: 2022-01-20
Payer: MEDICARE

## 2022-01-20 PROCEDURE — 97110 THERAPEUTIC EXERCISES: CPT

## 2022-01-20 PROCEDURE — 97112 NEUROMUSCULAR REEDUCATION: CPT

## 2022-01-20 NOTE — FLOWSHEET NOTE
resisted walking 2x10  2x10  x10 f/b  x5 s/s 6\"  6\"  4 plates  2 plates             Pt Ed: POC, HEP, Role of PT, anatomy of injury, typical progressions 15'    Manual Intervention (49557)                              HEP instruction: Access Code: [email or web address]  URL: [email or web address]  Date: 01/13/2022  Prepared by: Latasha Mccann     Exercises  Hooklying Isometric Clamshell - 1 x daily - 2 x weekly - 3 sets - 10 reps  Supine Bridge with Resistance Band - 1 x daily - 2 x weekly - 3 sets - 10 reps  Standing March with Counter Support - 1 x daily - 2 x weekly - 3 sets - 10 reps  Heel rises with counter support - 1 x daily - 2 x weekly - 3 sets - 10 reps  Sit to Stand - 1 x daily - 2 x weekly - 3 sets - 10 reps    Therapeutic Exercise and NMR EXR  [x] (22782) Provided verbal/tactile cueing for activities related to strengthening, flexibility, endurance, ROM for improvements in LE, proximal hip, and core control with self care, mobility, lifting, ambulation. [x] (45010) Provided verbal/tactile cueing for activities related to improving balance, coordination, kinesthetic sense, posture, motor skill, proprioception  to assist with LE, proximal hip, and core control in self care, mobility, lifting, ambulation and eccentric single leg control.      NMR and Therapeutic Activities:    [x] (13202 or 42762) Provided verbal/tactile cueing for activities related to improving balance, coordination, kinesthetic sense, posture, motor skill, proprioception and motor activation to allow for proper function of core, proximal hip and LE with self care and ADLs  [x] (78340) Gait Re-education- Provided training and instruction to the patient for proper LE, core and proximal hip recruitment and positioning and eccentric body weight control with ambulation re-education including up and down stairs     Home Exercise Program:    [x] (10095) Reviewed/Progressed HEP activities related to strengthening, flexibility, endurance, ROM of core, proximal hip and LE for functional self-care, mobility, lifting and ambulation/stair navigation   [] (65337)Reviewed/Progressed HEP activities related to improving balance, coordination, kinesthetic sense, posture, motor skill, proprioception of core, proximal hip and LE for self care, mobility, lifting, and ambulation/stair navigation      Manual Treatments:  PROM / STM / Oscillations-Mobs:  G-I, II, III, IV (PA's, Inf., Post.)  [x] (05031) Provided manual therapy to mobilize LE, proximal hip and/or LS spine soft tissue/joints for the purpose of modulating pain, promoting relaxation,  increasing ROM, reducing/eliminating soft tissue swelling/inflammation/restriction, improving soft tissue extensibility and allowing for proper ROM for normal function with self care, mobility, lifting and ambulation. Modalities:     [] GAME READY (VASO)- for significant edema, swelling, pain control. Charges  Timed Code Treatment Minutes: 40   Total Treatment Minutes: 40       [] EVAL (LOW) 47551   [] EVAL (MOD) 08103  [] EVAL (HIGH) 21663   [] RE-EVAL     [x] EZ(72249) x 2    [] IONTO  [] NMR (93837) x     [] VASO  [x] Manual (86733) x 1     [] Other:  [] TA x      [] Mech Traction (41698)  [] ES(attended) (43404)      [] ES (un) (65990):       GOALS:  Patient stated goal: To get back to being able to walk his dog and ascend/descend his basement steps. [] Progressing: [] Met: [] Not Met: [] Adjusted    Therapist goals for Patient:   Short Term Goals: To be achieved in: 2 weeks  1. Independent in HEP and progression per patient tolerance, in order to prevent re-injury. [] Progressing: [] Met: [] Not Met: [] Adjusted  2. Patient will have a decrease in pain to facilitate improvement in movement, function, and ADLs as indicated by Functional Deficits. [] Progressing: [] Met: [] Not Met: [] Adjusted    Long Term Goals: To be achieved in: 12 weeks  1.  Disability index score of 30% or less for the LEFS to assist with reaching prior level of function. [] Progressing: [] Met: [] Not Met: [] Adjusted  2. Patient will demonstrate increased AROM hip flexion to 90 degrees to allow for proper joint functioning as indicated by patients Functional Deficits. [] Progressing: [] Met: [] Not Met: [] Adjusted  3. Patient will demonstrate an increase in Strength to good proximal hip strength and control, equal bilaterally by MMT in LE to allow for proper functional mobility as indicated by patients Functional Deficits. [] Progressing: [] Met: [] Not Met: [] Adjusted  4. Patient will return to all household ADL's and dog walking functional activities without increased symptoms or restriction. [] Progressing: [] Met: [] Not Met: [] Adjusted  5. Patient will report ability to ascend/descend basement steps without issue or restriction. [] Progressing: [] Met: [] Not Met: [] Adjusted    Overall Progression Towards Functional goals/ Treatment Progress Update:  [] Patient is progressing as expected towards functional goals listed. [] Progression is slowed due to complexities/Impairments listed. [] Progression has been slowed due to co-morbidities. [x] Plan just implemented, too soon to assess goals progression <30days   [] Goals require adjustment due to lack of progress  [] Patient is not progressing as expected and requires additional follow up with physician  [] Other    Prognosis for POC: [x] Good [] Fair  [] Poor      Patient requires continued skilled intervention: [x] Yes  [] No    Treatment/Activity Tolerance:  [x] Patient able to complete treatment  [] Patient limited by fatigue  [] Patient limited by pain    [] Patient limited by other medical complications  [] Other:     ASSESSMENT:Anup continues to tolerate session well, we did add in some small range SLR in supine today without any issues. He would continue to benefit from skilled physical therapy to maximize his functional outcomes and progress towards goals.        PLAN: See eval  [] Continue per plan of care [] Alter current plan (see comments above)  [x] Plan of care initiated [] Hold pending MD visit [] Discharge      Electronically signed by:  Latasha Mccann PT    Note: If patient does not return for scheduled/ recommended follow up visits, this note will serve as a discharge from care along with most recent update on progress.

## 2022-01-24 ENCOUNTER — TELEPHONE (OUTPATIENT)
Dept: ORTHOPEDIC SURGERY | Age: 82
End: 2022-01-24

## 2022-01-24 RX ORDER — CEPHALEXIN 500 MG/1
CAPSULE ORAL
Qty: 4 CAPSULE | Refills: 0 | Status: SHIPPED | OUTPATIENT
Start: 2022-01-24 | End: 2022-04-12 | Stop reason: ALTCHOICE

## 2022-01-25 ENCOUNTER — HOSPITAL ENCOUNTER (OUTPATIENT)
Dept: PHYSICAL THERAPY | Age: 82
Setting detail: THERAPIES SERIES
Discharge: HOME | End: 2022-01-25
Payer: MEDICARE

## 2022-01-25 ENCOUNTER — TELEPHONE (OUTPATIENT)
Dept: ORTHOPEDIC SURGERY | Age: 82
End: 2022-01-25

## 2022-01-25 PROCEDURE — 97140 MANUAL THERAPY 1/> REGIONS: CPT

## 2022-01-25 PROCEDURE — 97110 THERAPEUTIC EXERCISES: CPT

## 2022-01-25 NOTE — TELEPHONE ENCOUNTER
Prescription Refill     Medication Name:  ANTIBOTIC/DENTAL APPT 02/03  Pharmacy: [facility].  [facility]  Patient Contact Number:  [phone]

## 2022-01-25 NOTE — FLOWSHEET NOTE
[facility],  [address]  Phone: [phone]  Fax [phone]    Physical Therapy Treatment Note/ Progress Report:           Date:  2022    Patient Name:  Derek Sweeney    :  1940  MRN: [id number]  Restrictions/Precautions:    Medical/Treatment Diagnosis Information:  Diagnosis: M16.12 (ICD-10-CM) - Primary osteoarthritis of left hip  Treatment Diagnosis: left hip pain F52.411  Insurance/Certification information:  PT Insurance Information: [facility]  Physician Information:  Referring Practitioner: Dr. Abhijit Rios  Has the plan of care been signed (Y/N):        []  Yes  [x]  No     Date of Patient follow up with Physician: 22      Is this a Progress Report:     []  Yes  [x]  No        If Yes:  Date Range for reporting period:  Beginning 22  Ending    Progress report will be due (10 Rx or 30 days whichever is less): 2/93/10       Recertification will be due (POC Duration  / 90 days whichever is less): 22      Visit # Insurance Allowable Auth Required   In-person 4 BMN [x]  No        Functional Scale: LEFS: 2780 66% Disability    Date assessed:  22      Number of Comorbidities:  []0     [x]1-2    []3+    Latex Allergy:  [x]NO      []YES  Preferred Language for Healthcare:   [x]English       []other:      Pain level:  0-2/10     SUBJECTIVE:  Pt reports that his hip was pretty sore over the weekend but is feeling a lot better today.  He did rest from the exercises over the weekend    OBJECTIVE:    Observation: incision in good healing, no gross notable effusion   Test measurements:      RESTRICTIONS/PRECAUTIONS: L HAMLET 21    Exercises/Interventions:     Therapeutic Ex (33003)/NMR re-education (24184) Sets/sec Notes/CUES   Hooklying Clams  Supine Bridge  SLR small range  LAQ 3x10  2x12  2x10  2x10 GTB  RTB; 6\" box  0#  0#   Standing Calf Raise  [facility] hip abd   x15  2x10 R/L     30# FSU  LSU   2x10  2x10   6\"  6\"               Pt Ed: POC, HEP, Role of PT, anatomy of injury, typical progressions 15'    Manual Intervention (19959)                              HEP instruction: Access Code: [email or web address]  URL: [email or web address]  Date: 01/13/2022  Prepared by: Marcus Reinoso     Exercises  Hooklying Isometric Clamshell - 1 x daily - 2 x weekly - 3 sets - 10 reps  Supine Bridge with Resistance Band - 1 x daily - 2 x weekly - 3 sets - 10 reps  Standing March with Counter Support - 1 x daily - 2 x weekly - 3 sets - 10 reps  Heel rises with counter support - 1 x daily - 2 x weekly - 3 sets - 10 reps  Sit to Stand - 1 x daily - 2 x weekly - 3 sets - 10 reps    Therapeutic Exercise and NMR EXR  [x] (45479) Provided verbal/tactile cueing for activities related to strengthening, flexibility, endurance, ROM for improvements in LE, proximal hip, and core control with self care, mobility, lifting, ambulation. [x] (00295) Provided verbal/tactile cueing for activities related to improving balance, coordination, kinesthetic sense, posture, motor skill, proprioception  to assist with LE, proximal hip, and core control in self care, mobility, lifting, ambulation and eccentric single leg control.      NMR and Therapeutic Activities:    [x] (18419 or 76630) Provided verbal/tactile cueing for activities related to improving balance, coordination, kinesthetic sense, posture, motor skill, proprioception and motor activation to allow for proper function of core, proximal hip and LE with self care and ADLs  [x] (82564) Gait Re-education- Provided training and instruction to the patient for proper LE, core and proximal hip recruitment and positioning and eccentric body weight control with ambulation re-education including up and down stairs     Home Exercise Program:    [x] (87372) Reviewed/Progressed HEP activities related to strengthening, flexibility, endurance, ROM of core, proximal hip and LE for functional self-care, mobility, lifting and ambulation/stair navigation   [] (64931)Reviewed/Progressed HEP activities related to improving balance, coordination, kinesthetic sense, posture, motor skill, proprioception of core, proximal hip and LE for self care, mobility, lifting, and ambulation/stair navigation      Manual Treatments:  PROM / STM / Oscillations-Mobs:  G-I, II, III, IV (PA's, Inf., Post.)  [x] (22561) Provided manual therapy to mobilize LE, proximal hip and/or LS spine soft tissue/joints for the purpose of modulating pain, promoting relaxation,  increasing ROM, reducing/eliminating soft tissue swelling/inflammation/restriction, improving soft tissue extensibility and allowing for proper ROM for normal function with self care, mobility, lifting and ambulation. Modalities:     [] GAME READY (VASO)- for significant edema, swelling, pain control. Charges  Timed Code Treatment Minutes: 40   Total Treatment Minutes: 40       [] EVAL (LOW) 12627   [] EVAL (MOD) 05088  [] EVAL (HIGH) 41132   [] RE-EVAL     [x] TD(79204) x 2    [] IONTO  [] NMR (54127) x     [] VASO  [x] Manual (74793) x 1     [] Other:  [] TA x      [] Mech Traction (11492)  [] ES(attended) (72992)      [] ES (un) (86644):       GOALS:  Patient stated goal: To get back to being able to walk his dog and ascend/descend his basement steps. [] Progressing: [] Met: [] Not Met: [] Adjusted    Therapist goals for Patient:   Short Term Goals: To be achieved in: 2 weeks  1. Independent in HEP and progression per patient tolerance, in order to prevent re-injury. [] Progressing: [] Met: [] Not Met: [] Adjusted  2. Patient will have a decrease in pain to facilitate improvement in movement, function, and ADLs as indicated by Functional Deficits. [] Progressing: [] Met: [] Not Met: [] Adjusted    Long Term Goals: To be achieved in: 12 weeks  1.  Disability index score of 30% or less for the LEFS to assist with reaching prior level of to maximize his functional outcomes and progress towards goals. PLAN:   [] Continue per plan of care [] Alter current plan (see comments above)  [x] Plan of care initiated [] Hold pending MD visit [] Discharge      Electronically signed by:  Theresa Goldberg, PT    Note: If patient does not return for scheduled/ recommended follow up visits, this note will serve as a discharge from care along with most recent update on progress.

## 2022-01-27 ENCOUNTER — HOSPITAL ENCOUNTER (OUTPATIENT)
Dept: PHYSICAL THERAPY | Age: 82
Setting detail: THERAPIES SERIES
Discharge: HOME | End: 2022-01-27
Payer: MEDICARE

## 2022-01-27 PROCEDURE — 97110 THERAPEUTIC EXERCISES: CPT

## 2022-01-27 PROCEDURE — 97140 MANUAL THERAPY 1/> REGIONS: CPT

## 2022-01-27 NOTE — FLOWSHEET NOTE
[facility], [address] [place]  Phone: [phone]  Fax [phone]    Physical Therapy Treatment Note/ Progress Report:           Date:  2022    Patient Name:  Sanaz Hoover    :  1940  MRN: [id number]  Restrictions/Precautions:    Medical/Treatment Diagnosis Information:  Diagnosis: M16.12 (ICD-10-CM) - Primary osteoarthritis of left hip  Treatment Diagnosis: left hip pain R23.993  Insurance/Certification information:  PT Insurance Information: [facility]  Physician Information:  Referring Practitioner: Dr. Minal Ray  Has the plan of care been signed (Y/N):        []  Yes  [x]  No     Date of Patient follow up with Physician: 22      Is this a Progress Report:     []  Yes  [x]  No        If Yes:  Date Range for reporting period:  Beginning 22  Ending    Progress report will be due (10 Rx or 30 days whichever is less):        Recertification will be due (POC Duration  / 90 days whichever is less): 22      Visit # Insurance Allowable Auth Required   In-person 5 BMN [x]  No        Functional Scale: LEFS:  66% Disability    Date assessed:  22      Number of Comorbidities:  []0     [x]1-2    []3+    Latex Allergy:  [x]NO      []YES  Preferred Language for Healthcare:   [x]English       []other:      Pain level:  0-2/10     SUBJECTIVE:  Pt reports that he has no increased soreness this visit. He does have some lingering soreness over the lateral hip but is otherwise feeling good.     OBJECTIVE:    Observation: incision in good healing, no gross notable effusion   Test measurements:      RESTRICTIONS/PRECAUTIONS: L HAMLET 21    Exercises/Interventions:     Therapeutic Ex (64622)/NMR re-education (47045) Sets/sec Notes/CUES   Hooklying Clams  Supine Bridge  SLR small range  LAQ 3x10  2x12  2x10  3x10 GTB  RTB; 6\" box  0#  2#   Standing Calf Raise  RA hip abd   2x10  2x10 R/L 30#          FSU  LSU  CC resisted walking 2x10  2x10  x10 f/b   6\"  6\"  4 plates  2 plates             Pt Ed: POC, HEP, Role of PT, anatomy of injury, typical progressions 15'    Manual Intervention (53838)                              HEP instruction: Access Code: [email or web address]  URL: [email or web address]  Date: 01/13/2022  Prepared by: Efe Goins     Exercises  Hooklying Isometric Clamshell - 1 x daily - 2 x weekly - 3 sets - 10 reps  Supine Bridge with Resistance Band - 1 x daily - 2 x weekly - 3 sets - 10 reps  Standing March with Counter Support - 1 x daily - 2 x weekly - 3 sets - 10 reps  Heel rises with counter support - 1 x daily - 2 x weekly - 3 sets - 10 reps  Sit to Stand - 1 x daily - 2 x weekly - 3 sets - 10 reps    Therapeutic Exercise and NMR EXR  [x] (38402) Provided verbal/tactile cueing for activities related to strengthening, flexibility, endurance, ROM for improvements in LE, proximal hip, and core control with self care, mobility, lifting, ambulation. [x] (45428) Provided verbal/tactile cueing for activities related to improving balance, coordination, kinesthetic sense, posture, motor skill, proprioception  to assist with LE, proximal hip, and core control in self care, mobility, lifting, ambulation and eccentric single leg control.      NMR and Therapeutic Activities:    [x] (86071 or 70160) Provided verbal/tactile cueing for activities related to improving balance, coordination, kinesthetic sense, posture, motor skill, proprioception and motor activation to allow for proper function of core, proximal hip and LE with self care and ADLs  [x] (18519) Gait Re-education- Provided training and instruction to the patient for proper LE, core and proximal hip recruitment and positioning and eccentric body weight control with ambulation re-education including up and down stairs     Home Exercise Program:    [x] (83777) Reviewed/Progressed HEP activities related to strengthening, flexibility, endurance, ROM of core, proximal hip and LE for functional self-care, mobility, lifting and ambulation/stair navigation   [] (38047)Reviewed/Progressed HEP activities related to improving balance, coordination, kinesthetic sense, posture, motor skill, proprioception of core, proximal hip and LE for self care, mobility, lifting, and ambulation/stair navigation      Manual Treatments:  PROM / STM / Oscillations-Mobs:  G-I, II, III, IV (PA's, Inf., Post.)  [x] (10223) Provided manual therapy to mobilize LE, proximal hip and/or LS spine soft tissue/joints for the purpose of modulating pain, promoting relaxation,  increasing ROM, reducing/eliminating soft tissue swelling/inflammation/restriction, improving soft tissue extensibility and allowing for proper ROM for normal function with self care, mobility, lifting and ambulation. Modalities:     [] GAME READY (VASO)- for significant edema, swelling, pain control. Charges  Timed Code Treatment Minutes: 40   Total Treatment Minutes: 40       [] EVAL (LOW) 20514   [] EVAL (MOD) 96771  [] EVAL (HIGH) 64444   [] RE-EVAL     [x] BW(26811) x 2    [] IONTO  [] NMR (44918) x     [] VASO  [x] Manual (98265) x 1     [] Other:  [] TA x      [] Mech Traction (52254)  [] ES(attended) (02752)      [] ES (un) (98009):       GOALS:  Patient stated goal: To get back to being able to walk his dog and ascend/descend his basement steps. [] Progressing: [] Met: [] Not Met: [] Adjusted    Therapist goals for Patient:   Short Term Goals: To be achieved in: 2 weeks  1. Independent in HEP and progression per patient tolerance, in order to prevent re-injury. [] Progressing: [] Met: [] Not Met: [] Adjusted  2. Patient will have a decrease in pain to facilitate improvement in movement, function, and ADLs as indicated by Functional Deficits. [] Progressing: [] Met: [] Not Met: [] Adjusted    Long Term Goals: To be achieved in: 12 weeks  1.  Disability index score of 30% or less for the LEFS to assist with reaching prior level of function. [] Progressing: [] Met: [] Not Met: [] Adjusted  2. Patient will demonstrate increased AROM hip flexion to 90 degrees to allow for proper joint functioning as indicated by patients Functional Deficits. [] Progressing: [] Met: [] Not Met: [] Adjusted  3. Patient will demonstrate an increase in Strength to good proximal hip strength and control, equal bilaterally by MMT in LE to allow for proper functional mobility as indicated by patients Functional Deficits. [] Progressing: [] Met: [] Not Met: [] Adjusted  4. Patient will return to all household ADL's and dog walking functional activities without increased symptoms or restriction. [] Progressing: [] Met: [] Not Met: [] Adjusted  5. Patient will report ability to ascend/descend basement steps without issue or restriction. [] Progressing: [] Met: [] Not Met: [] Adjusted    Overall Progression Towards Functional goals/ Treatment Progress Update:  [] Patient is progressing as expected towards functional goals listed. [] Progression is slowed due to complexities/Impairments listed. [] Progression has been slowed due to co-morbidities. [x] Plan just implemented, too soon to assess goals progression <30days   [] Goals require adjustment due to lack of progress  [] Patient is not progressing as expected and requires additional follow up with physician  [] Other    Prognosis for POC: [x] Good [] Fair  [] Poor      Patient requires continued skilled intervention: [x] Yes  [] No    Treatment/Activity Tolerance:  [x] Patient able to complete treatment  [] Patient limited by fatigue  [] Patient limited by pain    [] Patient limited by other medical complications  [] Other:     ASSESSMENT: Anup tolerated re-introduction to resisted CC walking without issue. He did have less soreness today and was able to handle a full workload without increased symptoms.  He would continue to benefit from skilled physical therapy to

## 2022-02-01 ENCOUNTER — HOSPITAL ENCOUNTER (OUTPATIENT)
Dept: PHYSICAL THERAPY | Age: 82
Setting detail: THERAPIES SERIES
Discharge: HOME | End: 2022-02-01
Payer: MEDICARE

## 2022-02-01 PROCEDURE — 97140 MANUAL THERAPY 1/> REGIONS: CPT

## 2022-02-01 PROCEDURE — 97110 THERAPEUTIC EXERCISES: CPT

## 2022-02-01 NOTE — FLOWSHEET NOTE
[facility], [address] [place]  Phone: [phone]  Fax [phone]    Physical Therapy Treatment Note/ Progress Report:           Date:  2022    Patient Name:  Gurpreet Davidson    :  1940  MRN: [id number]  Restrictions/Precautions:    Medical/Treatment Diagnosis Information:  Diagnosis: M16.12 (ICD-10-CM) - Primary osteoarthritis of left hip  Treatment Diagnosis: left hip pain X93.907  Insurance/Certification information:  PT Insurance Information: [facility]  Physician Information:  Referring Practitioner: Dr. Christiana Cavazos  Has the plan of care been signed (Y/N):        []  Yes  [x]  No     Date of Patient follow up with Physician: 22      Is this a Progress Report:     []  Yes  [x]  No        If Yes:  Date Range for reporting period:  Beginning 22  Ending    Progress report will be due (10 Rx or 30 days whichever is less):        Recertification will be due (POC Duration  / 90 days whichever is less): 22      Visit # Insurance Allowable Auth Required   In-person 6 BMN [x]  No        Functional Scale: LEFS:  66% Disability    Date assessed:  22      Number of Comorbidities:  []0     [x]1-2    []3+    Latex Allergy:  [x]NO      []YES  Preferred Language for Healthcare:   [x]English       []other:      Pain level:  0-2/10     SUBJECTIVE:  Pt reports no new issues with his hip, feels like the exercises are getting easier.     OBJECTIVE:    Observation: incision in good healing, no gross notable effusion   Test measurements:      RESTRICTIONS/PRECAUTIONS: L HAMLET 21    Exercises/Interventions:     Therapeutic Ex (65853)/NMR re-education (74429) Sets/sec Notes/CUES   Clams  Supine Bridge  SLR small range  LAQ 2x12  2x12  2x10  3x10 RTB  RTB; 6\" box  0#  2#   Standing Calf Raise  RA hip abd   2x10  2x10 R/L     45#          FSU  LSU  CC resisted walking 2x10  2x10  x10 f/b 6\"  6\"  4 plates  2 plates             Pt Ed: POC, HEP, Role of PT, anatomy of injury, typical progressions 15'    Manual Intervention (86097)                              HEP instruction: Access Code: [email or web address]  URL: [email or web address]  Date: 01/13/2022  Prepared by: Latasha Mccann     Exercises  Hooklying Isometric Clamshell - 1 x daily - 2 x weekly - 3 sets - 10 reps  Supine Bridge with Resistance Band - 1 x daily - 2 x weekly - 3 sets - 10 reps  Standing March with Counter Support - 1 x daily - 2 x weekly - 3 sets - 10 reps  Heel rises with counter support - 1 x daily - 2 x weekly - 3 sets - 10 reps  Sit to Stand - 1 x daily - 2 x weekly - 3 sets - 10 reps    Therapeutic Exercise and NMR EXR  [x] (06679) Provided verbal/tactile cueing for activities related to strengthening, flexibility, endurance, ROM for improvements in LE, proximal hip, and core control with self care, mobility, lifting, ambulation. [x] (55023) Provided verbal/tactile cueing for activities related to improving balance, coordination, kinesthetic sense, posture, motor skill, proprioception  to assist with LE, proximal hip, and core control in self care, mobility, lifting, ambulation and eccentric single leg control.      NMR and Therapeutic Activities:    [x] (34469 or 41655) Provided verbal/tactile cueing for activities related to improving balance, coordination, kinesthetic sense, posture, motor skill, proprioception and motor activation to allow for proper function of core, proximal hip and LE with self care and ADLs  [x] (85104) Gait Re-education- Provided training and instruction to the patient for proper LE, core and proximal hip recruitment and positioning and eccentric body weight control with ambulation re-education including up and down stairs     Home Exercise Program:    [x] (84861) Reviewed/Progressed HEP activities related to strengthening, flexibility, endurance, ROM of core, proximal hip and LE for functional self-care, mobility, lifting and ambulation/stair navigation   [] (81130)Reviewed/Progressed HEP activities related to improving balance, coordination, kinesthetic sense, posture, motor skill, proprioception of core, proximal hip and LE for self care, mobility, lifting, and ambulation/stair navigation      Manual Treatments:  PROM / STM / Oscillations-Mobs:  G-I, II, III, IV (PA's, Inf., Post.)  [x] (79138) Provided manual therapy to mobilize LE, proximal hip and/or LS spine soft tissue/joints for the purpose of modulating pain, promoting relaxation,  increasing ROM, reducing/eliminating soft tissue swelling/inflammation/restriction, improving soft tissue extensibility and allowing for proper ROM for normal function with self care, mobility, lifting and ambulation. Modalities:     [] GAME READY (VASO)- for significant edema, swelling, pain control. Charges  Timed Code Treatment Minutes: 40   Total Treatment Minutes: 40       [] EVAL (LOW) 88851   [] EVAL (MOD) 05131  [] EVAL (HIGH) 79975   [] RE-EVAL     [x] BS(49421) x 2    [] IONTO  [] NMR (89977) x     [] VASO  [x] Manual (59985) x 1     [] Other:  [] TA x      [] Mech Traction (10676)  [] ES(attended) (64584)      [] ES (un) (62219):       GOALS:  Patient stated goal: To get back to being able to walk his dog and ascend/descend his basement steps. [] Progressing: [] Met: [] Not Met: [] Adjusted    Therapist goals for Patient:   Short Term Goals: To be achieved in: 2 weeks  1. Independent in HEP and progression per patient tolerance, in order to prevent re-injury. [] Progressing: [] Met: [] Not Met: [] Adjusted  2. Patient will have a decrease in pain to facilitate improvement in movement, function, and ADLs as indicated by Functional Deficits. [] Progressing: [] Met: [] Not Met: [] Adjusted    Long Term Goals: To be achieved in: 12 weeks  1. Disability index score of 30% or less for the LEFS to assist with reaching prior level of function.    [] Progressing: [] Met: [] Not Met: [] Adjusted  2. Patient will demonstrate increased AROM hip flexion to 90 degrees to allow for proper joint functioning as indicated by patients Functional Deficits. [] Progressing: [] Met: [] Not Met: [] Adjusted  3. Patient will demonstrate an increase in Strength to good proximal hip strength and control, equal bilaterally by MMT in LE to allow for proper functional mobility as indicated by patients Functional Deficits. [] Progressing: [] Met: [] Not Met: [] Adjusted  4. Patient will return to all household ADL's and dog walking functional activities without increased symptoms or restriction. [] Progressing: [] Met: [] Not Met: [] Adjusted  5. Patient will report ability to ascend/descend basement steps without issue or restriction. [] Progressing: [] Met: [] Not Met: [] Adjusted    Overall Progression Towards Functional goals/ Treatment Progress Update:  [] Patient is progressing as expected towards functional goals listed. [] Progression is slowed due to complexities/Impairments listed. [] Progression has been slowed due to co-morbidities. [x] Plan just implemented, too soon to assess goals progression <30days   [] Goals require adjustment due to lack of progress  [] Patient is not progressing as expected and requires additional follow up with physician  [] Other    Prognosis for POC: [x] Good [] Fair  [] Poor      Patient requires continued skilled intervention: [x] Yes  [] No    Treatment/Activity Tolerance:  [x] Patient able to complete treatment  [] Patient limited by fatigue  [] Patient limited by pain    [] Patient limited by other medical complications  [] Other:     ASSESSMENT: Demetri Melara continued to tolerate exercises without issue today, his overall strength is improving as expected s/p L HAMLET. He would continue to benefit from skilled physical therapy to maximize his functional outcomes and progress towards goals.        PLAN:   [] Continue per plan of care [] Alter current plan (see comments above)  [x] Plan of care initiated [] Hold pending MD visit [] Discharge      Electronically signed by:  Alvah Seip, PT    Note: If patient does not return for scheduled/ recommended follow up visits, this note will serve as a discharge from care along with most recent update on progress.

## 2022-02-03 ENCOUNTER — HOSPITAL ENCOUNTER (OUTPATIENT)
Dept: PHYSICAL THERAPY | Age: 82
Setting detail: THERAPIES SERIES
End: 2022-02-03
Payer: MEDICARE

## 2022-02-04 ENCOUNTER — HOSPITAL ENCOUNTER (OUTPATIENT)
Dept: PHYSICAL THERAPY | Age: 82
Setting detail: THERAPIES SERIES
Discharge: HOME | End: 2022-02-04
Payer: MEDICARE

## 2022-02-04 NOTE — FLOWSHEET NOTE
[facility],  [address]        Physical Therapy  Cancellation/No-show Note  Patient Name:  Darryle Seaman  :  1940   Date:  2022  Cancelled visits to date: 1  No-shows to date: 0    For today's appointment patient:  [x]  Cancelled  []  Rescheduled appointment  []  No-show     Reason given by patient:  []  Patient ill  []  Conflicting appointment  []  No transportation    []  Conflict with work  []  No reason given  []  Other:     Comments:  weather    Electronically signed by:  Jorge Cam, PT

## 2022-02-11 DIAGNOSIS — E78.2 MIXED HYPERLIPIDEMIA: ICD-10-CM

## 2022-02-11 RX ORDER — ATORVASTATIN CALCIUM 40 MG/1
TABLET, FILM COATED ORAL
Qty: 90 TABLET | Refills: 0 | Status: SHIPPED | OUTPATIENT
Start: 2022-02-11 | End: 2022-03-07 | Stop reason: SDUPTHER

## 2022-02-11 NOTE — TELEPHONE ENCOUNTER
.  Refill Request     Last Seen: Last Seen Department: 11/29/2021  Last Seen by PCP: 11/29/2021    Last Written: 8-23-21 90 with 1     Next Appointment:   Future Appointments   Date Time Provider Jeb Pedro   3/7/2022  9:15 AM DO TEE Juan  [facility] [facility]   4/6/2022  1:15 PM Jeannette Vargas PA-C [facility]       Future appointment scheduled      Requested Prescriptions     Pending Prescriptions Disp Refills    atorvastatin (LIPITOR) 40 MG tablet [Pharmacy Med Name: Atorvastatin Calcium Oral Tablet 40 MG] 90 tablet 0     Sig: TAKE 1 TABLET BY MOUTH EVERY DAY

## 2022-02-15 ENCOUNTER — TELEPHONE (OUTPATIENT)
Dept: CARDIOLOGY CLINIC | Age: 82
End: 2022-02-15

## 2022-02-15 NOTE — TELEPHONE ENCOUNTER
Spoke with pt and scheduled him for an echo and ov with smm at [facility] for 4/12/22 starting at 130pm.

## 2022-02-15 NOTE — TELEPHONE ENCOUNTER
Per PAULINE last note, can you schedule pt for an echo and OV with him in 4/2022 in [facility]?  Ml GRAHAM

## 2022-03-07 ENCOUNTER — OFFICE VISIT (OUTPATIENT)
Dept: FAMILY MEDICINE CLINIC | Age: 82
End: 2022-03-07
Payer: MEDICARE

## 2022-03-07 VITALS
BODY MASS INDEX: 33.97 KG/M2 | SYSTOLIC BLOOD PRESSURE: 146 MMHG | HEART RATE: 57 BPM | DIASTOLIC BLOOD PRESSURE: 70 MMHG | OXYGEN SATURATION: 98 % | WEIGHT: 230 LBS

## 2022-03-07 DIAGNOSIS — E78.2 MIXED HYPERLIPIDEMIA: ICD-10-CM

## 2022-03-07 DIAGNOSIS — I48.91 ATRIAL FIBRILLATION, UNSPECIFIED TYPE (HCC): ICD-10-CM

## 2022-03-07 DIAGNOSIS — I10 ESSENTIAL HYPERTENSION: ICD-10-CM

## 2022-03-07 DIAGNOSIS — E11.9 TYPE 2 DIABETES MELLITUS WITHOUT COMPLICATION, WITHOUT LONG-TERM CURRENT USE OF INSULIN (HCC): Primary | ICD-10-CM

## 2022-03-07 LAB — HBA1C MFR BLD: 5.2 %

## 2022-03-07 PROCEDURE — 99214 OFFICE O/P EST MOD 30 MIN: CPT | Performed by: FAMILY MEDICINE

## 2022-03-07 PROCEDURE — 83036 HEMOGLOBIN GLYCOSYLATED A1C: CPT | Performed by: FAMILY MEDICINE

## 2022-03-07 RX ORDER — ATORVASTATIN CALCIUM 40 MG/1
TABLET, FILM COATED ORAL
Qty: 90 TABLET | Refills: 1 | Status: SHIPPED | OUTPATIENT
Start: 2022-03-07 | End: 2022-09-02 | Stop reason: SDUPTHER

## 2022-03-07 NOTE — PROGRESS NOTES
Sanaz Hoover is a [de-identified] y.o. male    Chief Complaint   Patient presents with    Diabetes    Hypertension    Hyperlipidemia       HPI:    Diabetes  He presents for his follow-up diabetic visit. He has type 2 diabetes mellitus. His disease course has been stable. Pertinent negatives for hypoglycemia include no dizziness. Pertinent negatives for diabetes include no polydipsia. Risk factors for coronary artery disease include male sex, diabetes mellitus and hypertension. Current diabetic treatment includes oral agent (monotherapy). An ACE inhibitor/angiotensin II receptor blocker is being taken. Hypertension  This is a chronic problem. The current episode started more than 1 year ago. The problem is unchanged. The problem is controlled. Risk factors for coronary artery disease include diabetes mellitus. Past treatments include ACE inhibitors, beta blockers and diuretics. The current treatment provides significant improvement. There are no compliance problems. Hyperlipidemia  This is a chronic problem. The current episode started more than 1 year ago. The problem is controlled. Recent lipid tests were reviewed and are normal. Exacerbating diseases include diabetes. Pertinent negatives include no myalgias. Current antihyperlipidemic treatment includes statins. The current treatment provides significant improvement of lipids. There are no compliance problems. Risk factors for coronary artery disease include diabetes mellitus, hypertension, male sex and obesity. ROS:    Review of Systems   Endocrine: Negative for polydipsia. Musculoskeletal: Negative for myalgias. Neurological: Negative for dizziness. BP (!) 146/70   Pulse 57   Wt 230 lb (104.3 kg)   SpO2 98%   BMI 33.97 kg/m²     Physical Exam:    Physical Exam  Constitutional:       General: He is not in acute distress. Appearance: Normal appearance. He is obese. He is not ill-appearing or toxic-appearing. HENT:      Head: Normocephalic. Neurological:      Mental Status: He is alert. Psychiatric:         Mood and Affect: Mood normal.         Behavior: Behavior normal.         Thought Content: Thought content normal.         Current Outpatient Medications   Medication Sig Dispense Refill    atorvastatin (LIPITOR) 40 MG tablet TAKE 1 TABLET BY MOUTH EVERY DAY 90 tablet 1    cephALEXin (KEFLEX) 500 MG capsule Take 2 capsules one hour prior to dental procedure/ cleaning 4 capsule 0    hydrocortisone 2.5 % cream Apply topically 2 times daily. 30 g 2    lisinopril (PRINIVIL;ZESTRIL) 20 MG tablet TAKE 1/2 TABLET (10 mg) BY MOUTH ONE TIME A DAY 45 tablet 1    apixaban (ELIQUIS) 5 MG TABS tablet Take 1 tablet by mouth 2 times daily 180 tablet 3    metFORMIN (GLUCOPHAGE) 500 MG tablet Take 1 tablet by mouth daily (with breakfast) 90 tablet 3    atenolol-chlorthalidone (TENORETIC) 100-25 MG per tablet Take 1 tablet by mouth daily (Patient taking differently: Take 1 tablet by mouth daily Takes 1/2 tablet daily) 90 tablet 3    Multiple Vitamins-Minerals (THERAPEUTIC MULTIVITAMIN-MINERALS) tablet Take 1 tablet by mouth daily        No current facility-administered medications for this visit. Assessment:    1. Type 2 diabetes mellitus without complication, without long-term current use of insulin ([facility]    2. Essential hypertension    3. Mixed hyperlipidemia    4. Atrial fibrillation, unspecified type ([facility]        Plan:     1. Type 2 diabetes mellitus without complication, without long-term current use of insulin ([facility])  Stable. Continue current medications. - POCT glycosylated hemoglobin (Hb A1C) 5.2  - metFORMIN (GLUCOPHAGE) 500 MG tablet; Take 1 tablet by mouth 2 times daily (with meals)  Dispense: 180 tablet; Refill: 3  - CBC Auto Differential  - Comprehensive Metabolic Panel    2. Essential hypertension  Considering increasing Lisinopril if BP's remain high. - atenolol-chlorthalidone (TENORETIC) 100-25 MG per tablet;  Take 1 tablet by mouth daily  Dispense: 90 tablet; Refill: 3  - lisinopril (PRINIVIL;ZESTRIL) 20 MG tablet; Take 1 tablet by mouth daily  Dispense: 90 tablet; Refill: 3  - CBC Auto Differential  - Comprehensive Metabolic Panel    3. Mixed hyperlipidemia  Stable. Continue current medications. - atorvastatin (LIPITOR) 40 MG tablet; Take 1 tablet by mouth daily  Dispense: 90 tablet; Refill: 3  - CBC Auto Differential  - Comprehensive Metabolic Panel  - Lipid Panel    Atrial fibrillation is stable. He sees cardiology. Return in about 6 months (around 9/7/2022) for Diabetes, Hypertension, Hyperlipidemia.

## 2022-03-29 RX ORDER — APIXABAN 5 MG/1
TABLET, FILM COATED ORAL
Qty: 60 TABLET | Refills: 5 | Status: SHIPPED | OUTPATIENT
Start: 2022-03-29 | End: 2022-04-12 | Stop reason: SDUPTHER

## 2022-03-29 NOTE — TELEPHONE ENCOUNTER
Last OV [facility] 10/14/2021   Upcoming OV [facility] 4/12/2022  [facility] 12/14/2021

## 2022-04-12 ENCOUNTER — HOSPITAL ENCOUNTER (OUTPATIENT)
Dept: CARDIOLOGY | Age: 82
Discharge: HOME | End: 2022-04-12
Payer: MEDICARE

## 2022-04-12 ENCOUNTER — OFFICE VISIT (OUTPATIENT)
Dept: CARDIOLOGY CLINIC | Age: 82
End: 2022-04-12
Payer: MEDICARE

## 2022-04-12 VITALS
WEIGHT: 229 LBS | DIASTOLIC BLOOD PRESSURE: 66 MMHG | SYSTOLIC BLOOD PRESSURE: 132 MMHG | HEIGHT: 69 IN | OXYGEN SATURATION: 98 % | BODY MASS INDEX: 33.92 KG/M2 | HEART RATE: 52 BPM

## 2022-04-12 DIAGNOSIS — I35.0 NONRHEUMATIC AORTIC VALVE STENOSIS: ICD-10-CM

## 2022-04-12 DIAGNOSIS — I48.91 ATRIAL FIBRILLATION, UNSPECIFIED TYPE (HCC): ICD-10-CM

## 2022-04-12 DIAGNOSIS — I10 ESSENTIAL HYPERTENSION: Primary | ICD-10-CM

## 2022-04-12 DIAGNOSIS — E78.2 MIXED HYPERLIPIDEMIA: ICD-10-CM

## 2022-04-12 LAB
LV EF: 55 %
LVEF MODALITY: NORMAL

## 2022-04-12 PROCEDURE — 93308 TTE F-UP OR LMTD: CPT

## 2022-04-12 PROCEDURE — 99214 OFFICE O/P EST MOD 30 MIN: CPT | Performed by: INTERNAL MEDICINE

## 2022-04-12 NOTE — PATIENT INSTRUCTIONS
Plan:  1. Continue taking all medications as prescribed   -Refill of Eliquis sent in for 1 year, samples also given today  2. Call us if you are having chest pain, SOB, near syncope, etc.    -will continue to monitor aortic stenosis   3. Activity as tolerated   4.  Follow up with me in 6 months   -recommend echo in 1 year

## 2022-04-12 NOTE — PROGRESS NOTES
[facility]   Cardiac Follow Up    Referring Provider:  Martha Shah DO     Chief Complaint   Patient presents with    Follow-up    Hypertension    Hyperlipidemia    Atrial Fibrillation      Subjective: Jose Meraz presents for cardiology follow up AF, HTN, HLD; No complaints today. History of Present Illness:    Jose Meraz is a [de-identified] y.o. male who presents for routine f/u. I originally saw him 1/15/20 to establish care and abnormal EKG possible afib. He has PMH of chronic afib dx 2/21 on eliquis, arthritis, s/p left THR 12/21, gout, DM, HTN, HLD, moderate AS, and prostate cancer s/p radiation seeds. Note ECHO on 1/10/2020 showed EF is 55-60%, mild to mod AS (velocity=2.99m/s; mean pressure=19mmHg). Most recent lexiscan myoview in 8/2015 showed normal myocardial perfusion. Originally from [facility] and is a retired . He wore a cardiac event monitor 2/2021 that showed 100% AF and 4% PVC burden. ECG 10/4/2021 afib, IVCD, anteroseptal infarct, 49 BPM. Most recent EKG 10/14/2021 AF, IVCD, anterior infarct, 57 BPM.     Note ECHO 3/21 showed moderate AS (velocity=3.83m/s; MPG=36mmHg). Due to worsened AS with quick progression from 1/20 study I referred to Dr. Nacho Martinez TAVR clinic. He felt AS moderate and did not require TAVR. Most recent  9/7/2021 showed LVEF of 60%, AV max velocity of 3.49 m/sec and MPG of 29 mmHg. This is c/w moderate aortic stenosis. Mild aortic regurgitation. Mild to moderate MR. Mild TR (SPAP) is normal and estimated 36mmHg (no change from 3/21 study). Today, 4/12/2022, patient reports feeling good. Patient reports occasional dizzy spells when bending over. No exertional CP or SOB or syncope. Patient reports left hip replacement in 12/2021. He reports he took his medications this morning. Patient reports Eliquis being expensive but can afford it. . Patient is taking all cardiac medications as prescribed and tolerates them well.   Patient denies current chest pain, sob, palpitations, or syncope. Patient reports he is going to [place] in June for 2 weeks. Past Medical History:   has a past medical history of Arthritis, Chronic gout without tophus, Diabetes mellitus ([facility] [address] ED (erectile dysfunction), Erectile dysfunction, Hyperlipidemia, Hypertension, Nocturia, Pre-diabetes, and Prostate CA ([facility] [address] Surgical History:   has a past surgical history that includes Prostate surgery; Tonsillectomy; Knee arthroscopy; knee surgery (Left, 1963); Colonoscopy (9/24/14); joint replacement (Left, 9/1/2015); Cataract removal (Bilateral, 03/2017); and Total hip arthroplasty (Left, 12/13/2021). Social History:   he is  with one son. He is a retired . He drinks alcohol- 2-3 drinks of high balls a day. no drug use. Never smokes. reports that he has never smoked. He has never used smokeless tobacco. He reports current alcohol use. He reports that he does not use drugs. Family History:  family history includes Cancer in his father. No significant heart disease history in parents    Home Medications:  Prior to Admission medications    Medication Sig Start Date End Date Taking?  Authorizing Provider   ELIQUIS 5 MG TABS tablet TAKE 1 TABLET BY MOUTH TWO TIMES A DAY 3/29/22  Yes Russ Martin MD   atorvastatin (LIPITOR) 40 MG tablet TAKE 1 TABLET BY MOUTH EVERY DAY 3/7/22  Yes Lorenza Daniel,    lisinopril (PRINIVIL;ZESTRIL) 20 MG tablet TAKE 1/2 TABLET (10 mg) BY MOUTH ONE TIME A DAY 11/19/21  Yes Raad Mitchell MD   metFORMIN (GLUCOPHAGE) 500 MG tablet Take 1 tablet by mouth daily (with breakfast) 9/2/21  Yes Camilla Rios,    atenolol-chlorthalidone (TENORETIC) 100-25 MG per tablet Take 1 tablet by mouth daily  Patient taking differently: Take 1 tablet by mouth daily Takes 1/2 tablet daily 8/17/20  Yes Camilla Rios, DO   Multiple Vitamins-Minerals (THERAPEUTIC MULTIVITAMIN-MINERALS) tablet Take 1 tablet by mouth daily    Yes Historical Provider, MD cephALEXin (KEFLEX) 500 MG capsule Take 2 capsules one hour prior to dental procedure/ cleaning  Patient not taking: Reported on 4/12/2022 1/24/22   Michell Vallejo MD   hydrocortisone 2.5 % cream Apply topically 2 times daily. Patient not taking: Reported on 4/12/2022 11/29/21   Camilla Dior, DO        Allergies:  Bactrim [sulfamethoxazole-trimethoprim] and Pcn [penicillins]     Review of Systems:   · Constitutional: there has been no unanticipated weight loss. There's been no change in energy level, sleep pattern, or activity level. · Eyes: No visual changes or diplopia. No scleral icterus. · ENT: No Headaches, hearing loss or vertigo. No mouth sores or sore throat. · Cardiovascular: Reviewed in HPI  · Respiratory: No cough or wheezing, no sputum production. No hematemesis. · Gastrointestinal: No abdominal pain, appetite loss, blood in stools. No change in bowel or bladder habits. · Genitourinary: No dysuria, trouble voiding, or hematuria. · Musculoskeletal:  No gait disturbance, weakness or joint complaints. · Integumentary: No rash or pruritis. · Neurological: No headache, diplopia, change in muscle strength, numbness or tingling. No change in gait, balance, coordination, mood, affect, memory, mentation, behavior. · Psychiatric: No anxiety, no depression. · Endocrine: No malaise, fatigue or temperature intolerance. No excessive thirst, fluid intake, or urination. No tremor. · Hematologic/Lymphatic: No abnormal bruising or bleeding, blood clots or swollen lymph nodes. · Allergic/Immunologic: No nasal congestion or hives. Physical Examination:    Vitals:    04/12/22 1446 04/12/22 1453 04/12/22 1520   BP: (!) 165/70 (!) 160/72 132/66   Pulse: (!) 47  52   SpO2: 98%     Weight: 229 lb (103.9 kg)     Height: 5' 9\" (1.753 m)       Body mass index is 33.82 kg/m².      Wt Readings from Last 3 Encounters:   04/12/22 229 lb (103.9 kg)   03/07/22 230 lb (104.3 kg)   01/12/22 229 lb (103.9 kg)     BP Readings from Last 3 Encounters:   04/12/22 132/66   03/07/22 (!) 146/70   12/14/21 (!) 160/67            Constitutional and General Appearance: NAD   Respiratory:  · Normal excursion and expansion without use of accessory muscles  · Resp Auscultation: Normal breath sounds without dullness  Cardiovascular:  · The apical impulses not displaced  · Heart tones are crisp and normal  · Cervical veins are not engorged  · The carotid upstroke is normal in amplitude and contour without delay or bruit  · Irregular rhythm, II/VI harsh MISTI  · Peripheral pulses are symmetrical and full  · There is no clubbing, cyanosis of the extremities. · 1-2 + BLE  edema  · Femoral Arteries: 2+ and equal  · Pedal Pulses: 2+ and equal   Abdomen:  · No masses or tenderness  · Liver/Spleen: No Abnormalities Noted  Neurological/Psychiatric:  · Alert and oriented in all spheres  · Moves all extremities well  · Exhibits normal gait balance and coordination  · No abnormalities of mood, affect, memory, mentation, or behavior are noted  Skin:  · Skin: warm and dry.     Lab Results   Component Value Date    CHOL 136 11/19/2019    CHOL 136 12/06/2017    CHOL 133 12/06/2016     Lab Results   Component Value Date    TRIG 148 11/19/2019    TRIG 118 12/06/2017    TRIG 135 12/06/2016     Lab Results   Component Value Date    HDL 39 (L) 03/11/2021    HDL 54 11/19/2019    HDL 41 12/06/2017     Lab Results   Component Value Date    LDLCALC 65 03/11/2021    [facility] 52 11/19/2019    LDLCALC 71 12/06/2017     Lab Results   Component Value Date    LABVLDL 15 03/11/2021    LABVLDL 30 11/19/2019    LABVLDL 24 12/06/2017     No results found for: CHOLHDLRATIO    [id number] Score for Atrial Fibrillation Stroke Risk   Risk   Factors  Component Value   C CHF No 0   H HTN Yes 1   A2 Age >= 76 Yes,  ([de-identified] y.o.) 2   D DM Yes 1   S2 Prior Stroke/TIA No 0   V Vascular Disease No 0   A Age 74-69 No,  [de-identified] y.o.) 0   Sc Sex male 0    [id number]  Score  4   Score last updated 10/14/21 1:38 PM EDT    Assessment:     1. Atrial fibrillation: Persistent. He wore a cardiac event monitor 2/2021 that showed 100% AF and 4% PVC burden. 2. Aortic stenosis: Most recent today shows normal EF=55%; moderate AS (velocity=3.33m/s; MPG=25mmHg (no sig change 9/7/2021) He was seen in TAVR clinic by Dr. Dominique Ackerman who feels moderate AS and no need for TAVR at this time. Follow serial ECHO studies but will spread out since no change recently. Likely repeat 1-2 years. No concerning CHF symptoms. .    3. Hypertension: Well controlled and will continue current medical regimen. 4. Hyperlipidemia: per PCP. I personally reviewed most recent lipids from 3/11/21 in Epic (see above). Well controlled and will continue current medical regimen of lipitor 40mg daily. {\PCP to recheck. 5. Diabetes mellitus: per PCP     6. SOB: Resolved. No complaints today    Plan:  1. Continue taking all medications as prescribed   -Refill of Eliquis sent in for 1 year, samples also given today  2. Call us if you are having chest pain, SOB, near syncope, etc.    -will continue to monitor aortic stenosis   3. Activity as tolerated   4. Follow up with me in 6 months   -recommend echo in 1 year    Scribe's attestation: This note was scribed in the presence of Dr. Emily Mills MD by Anthony Neff, Dr. Emily Mills, personally performed the services described in this documentation, as scribed by the above signed scribe in my presence. It is both accurate and complete to my knowledge. I agree with the details independently gathered by the clinical support staff, while the remaining scribed note accurately describes my personal service to the patient. Cost of prescription medications and patient compliance have been reviewed with patient. All questions answered. Thank you for allowing me to participate in the care of this individual.    Nika Reilly.  Vandana Damon M.D., [facility]

## 2022-04-18 ENCOUNTER — OFFICE VISIT (OUTPATIENT)
Dept: ORTHOPEDIC SURGERY | Age: 82
End: 2022-04-18
Payer: MEDICARE

## 2022-04-18 VITALS — HEIGHT: 69 IN | WEIGHT: 228 LBS | BODY MASS INDEX: 33.77 KG/M2 | RESPIRATION RATE: 18 BRPM

## 2022-04-18 DIAGNOSIS — Z96.642 HISTORY OF TOTAL HIP ARTHROPLASTY, LEFT: Primary | ICD-10-CM

## 2022-04-18 PROCEDURE — 99213 OFFICE O/P EST LOW 20 MIN: CPT | Performed by: PHYSICIAN ASSISTANT

## 2022-04-18 NOTE — PROGRESS NOTES
Dr Benjie Cortes      Date /Time 4/18/2022       10:19 AM EST  Name Derick Bryan             1940   Location  [facility]  MRN [id number]                No chief complaint on file. History of Present Illness      Derick Bryan is a [de-identified] y.o. male is here for post-op visit after LEFT  [id number] Total Hip Arthroplasty      Patient is 4 weeks months post left anterior total hip arthroplasty. Pain controlled. Patient denies fever or chills. Patient denies any drainage. Physical Exam    Based off 1997 Exam Criteria    There were no vitals taken for this visit. Constitutional:       General: He is not in acute distress. Appearance: Normal appearance. LEFT Hip: incision clean dry and intact. No surrounding  erythema or fluctuance. Neuro intact distal. No evidence of DVT. Imaging       X-rays were ordered today reviewed of the left hip.  3 views. AP pelvis, lateral, and false profile. They demonstrate a left total hip arthroplasty in good position. No evidence of loosening or periprosthetic fracture. Assessment and Plan    Diagnoses and all orders for this visit:    History of total hip arthroplasty, left  -     XR HIP LEFT (2-3 VIEWS); Future      Patient is doing well. Patient will continue with home exercises. He will follow up 1 year from surgery or sooner problems arise. We have discussed predental and preinvasive procedure antibiotics      Electronically signed by Ruiz Clay PA-C on 4/18/2022 at 1:11 PM  This dictation was generated by voice recognition computer software. Although all attempts are made to edit the dictation for accuracy, there may be errors in the transcription that are not intended.

## 2022-05-10 DIAGNOSIS — I10 ESSENTIAL HYPERTENSION: ICD-10-CM

## 2022-05-10 RX ORDER — ATENOLOL AND CHLORTHALIDONE TABLET 100; 25 MG/1; MG/1
TABLET ORAL
Qty: 90 TABLET | Refills: 1 | Status: SHIPPED | OUTPATIENT
Start: 2022-05-10

## 2022-05-10 NOTE — TELEPHONE ENCOUNTER
.  Refill Request     CONFIRM preferrred pharmacy with the patient. If Mail Order Rx - Pend for 90 day refill.       Last Seen: Last Seen Department: 3/7/2022  Last Seen by PCP: 3/7/2022    Last Written: 8- 90 with 3     Next Appointment:   Future Appointments   Date Time Provider Jeb Pedro   9/9/2022  2:30 PM [address] [place] [address] [place] - [place]   10/6/2022  1:00 PM MD [facility]       Future appointment scheduled      Requested Prescriptions     Pending Prescriptions Disp Refills    atenolol-chlorthalidone (TENORETIC) 100-25 MG per tablet [Pharmacy Med Name: Atenolol-Chlorthalidone Oral Tablet 100-25 MG] 90 tablet 1     Sig: TAKE 1 TABLET BY MOUTH ONE TIME A DAY

## 2022-05-24 NOTE — TELEPHONE ENCOUNTER
Pt called [facility] and stated [employer] is requesting M to sign off for 90 day supply for eliquis.  Pt has week of eliquis left

## 2022-05-25 NOTE — TELEPHONE ENCOUNTER
Last OV 4/12/22 [facility] OOT. Pt's insurance now needs Eliquis sent thru mail order. Please advise, thank you    Assessment:      1. Atrial fibrillation: Persistent. He wore a cardiac event monitor 2/2021 that showed 100% AF and 4% PVC burden.       2. Aortic stenosis: Most recent today shows normal EF=55%; moderate AS (velocity=3.33m/s; MPG=25mmHg (no sig change 9/7/2021) He was seen in TAVR clinic by Dr. Senthil Spencer who feels moderate AS and no need for TAVR at this time. Follow serial ECHO studies but will spread out since no change recently. Likely repeat 1-2 years. No concerning CHF symptoms. .     3. Hypertension: Well controlled and will continue current medical regimen.     4. Hyperlipidemia: per PCP. I personally reviewed most recent lipids from 3/11/21 in Epic (see above). Well controlled and will continue current medical regimen of lipitor 40mg daily. {\PCP to recheck.      5. Diabetes mellitus: per PCP      6. SOB: Resolved. No complaints today     Plan:  1. Continue taking all medications as prescribed              -Refill of Eliquis sent in for 1 year, samples also given today  2. Call us if you are having chest pain, SOB, near syncope, etc.               -will continue to monitor aortic stenosis   3. Activity as tolerated   4.  Follow up with me in 6 months              -recommend echo in 1 year

## 2022-08-23 DIAGNOSIS — R73.03 PRE-DIABETES: ICD-10-CM

## 2022-08-23 NOTE — TELEPHONE ENCOUNTER
Refill Request     CONFIRM preferrred pharmacy with the patient. If Mail Order Rx - Pend for 90 day refill.       Last Seen: Last Seen Department: 3/7/2022  Last Seen by PCP: 3/7/2022    Last Written: 9/2/2021    Next Appointment:   Future Appointments   Date Time Provider Jeb Pedro   9/2/2022  1:30 PM [address] [place] [address] [place]   10/6/2022  1:00 PM MD Robby Santiago [facility]       Future appointment scheduled      Requested Prescriptions     Pending Prescriptions Disp Refills    metFORMIN (GLUCOPHAGE) 500 MG tablet [Pharmacy Med Name: metFORMIN HCl Oral Tablet 500 MG] 90 tablet 0     Sig: TAKE 1 TABLET BY MOUTH EVERY DAY with breakfast

## 2022-09-02 ENCOUNTER — OFFICE VISIT (OUTPATIENT)
Dept: FAMILY MEDICINE CLINIC | Age: 82
End: 2022-09-02
Payer: MEDICARE

## 2022-09-02 VITALS
DIASTOLIC BLOOD PRESSURE: 72 MMHG | WEIGHT: 226 LBS | OXYGEN SATURATION: 95 % | SYSTOLIC BLOOD PRESSURE: 136 MMHG | HEART RATE: 52 BPM | BODY MASS INDEX: 33.37 KG/M2

## 2022-09-02 DIAGNOSIS — E78.2 MIXED HYPERLIPIDEMIA: ICD-10-CM

## 2022-09-02 DIAGNOSIS — Z12.5 SCREENING PSA (PROSTATE SPECIFIC ANTIGEN): ICD-10-CM

## 2022-09-02 DIAGNOSIS — E55.9 VITAMIN D DEFICIENCY: ICD-10-CM

## 2022-09-02 DIAGNOSIS — E11.9 TYPE 2 DIABETES MELLITUS WITHOUT COMPLICATION, WITHOUT LONG-TERM CURRENT USE OF INSULIN (HCC): Primary | ICD-10-CM

## 2022-09-02 DIAGNOSIS — I10 ESSENTIAL HYPERTENSION: ICD-10-CM

## 2022-09-02 LAB
BASOPHILS ABSOLUTE: 0 K/UL (ref 0–0.2)
BASOPHILS RELATIVE PERCENT: 0.8 %
EOSINOPHILS ABSOLUTE: 0.2 K/UL (ref 0–0.6)
EOSINOPHILS RELATIVE PERCENT: 3.5 %
HBA1C MFR BLD: 5.5 %
HCT VFR BLD CALC: 36.1 % (ref 40.5–52.5)
HEMOGLOBIN: 12.1 G/DL (ref 13.5–17.5)
LYMPHOCYTES ABSOLUTE: 1.3 K/UL (ref 1–5.1)
LYMPHOCYTES RELATIVE PERCENT: 21.8 %
MCH RBC QN AUTO: 26.9 PG (ref 26–34)
MCHC RBC AUTO-ENTMCNC: 33.6 G/DL (ref 31–36)
MCV RBC AUTO: 80.1 FL (ref 80–100)
MONOCYTES ABSOLUTE: 0.4 K/UL (ref 0–1.3)
MONOCYTES RELATIVE PERCENT: 6.7 %
NEUTROPHILS ABSOLUTE: 3.9 K/UL (ref 1.7–7.7)
NEUTROPHILS RELATIVE PERCENT: 67.2 %
PDW BLD-RTO: 17.2 % (ref 12.4–15.4)
PLATELET # BLD: 192 K/UL (ref 135–450)
PMV BLD AUTO: 8.8 FL (ref 5–10.5)
RBC # BLD: 4.51 M/UL (ref 4.2–5.9)
WBC # BLD: 5.8 K/UL (ref 4–11)

## 2022-09-02 PROCEDURE — 3044F HG A1C LEVEL LT 7.0%: CPT | Performed by: FAMILY MEDICINE

## 2022-09-02 PROCEDURE — 99214 OFFICE O/P EST MOD 30 MIN: CPT | Performed by: FAMILY MEDICINE

## 2022-09-02 PROCEDURE — 83036 HEMOGLOBIN GLYCOSYLATED A1C: CPT | Performed by: FAMILY MEDICINE

## 2022-09-02 PROCEDURE — 1123F ACP DISCUSS/DSCN MKR DOCD: CPT | Performed by: FAMILY MEDICINE

## 2022-09-02 RX ORDER — LISINOPRIL 20 MG/1
TABLET ORAL
Qty: 45 TABLET | Refills: 1 | Status: SHIPPED | OUTPATIENT
Start: 2022-09-02

## 2022-09-02 RX ORDER — ATORVASTATIN CALCIUM 40 MG/1
TABLET, FILM COATED ORAL
Qty: 90 TABLET | Refills: 1 | Status: SHIPPED | OUTPATIENT
Start: 2022-09-02

## 2022-09-02 ASSESSMENT — ANXIETY QUESTIONNAIRES
IF YOU CHECKED OFF ANY PROBLEMS ON THIS QUESTIONNAIRE, HOW DIFFICULT HAVE THESE PROBLEMS MADE IT FOR YOU TO DO YOUR WORK, TAKE CARE OF THINGS AT HOME, OR GET ALONG WITH OTHER PEOPLE: NOT DIFFICULT AT ALL
4. TROUBLE RELAXING: 0
1. FEELING NERVOUS, ANXIOUS, OR ON EDGE: 0
2. NOT BEING ABLE TO STOP OR CONTROL WORRYING: 0
3. WORRYING TOO MUCH ABOUT DIFFERENT THINGS: 0
5. BEING SO RESTLESS THAT IT IS HARD TO SIT STILL: 0
6. BECOMING EASILY ANNOYED OR IRRITABLE: 0
7. FEELING AFRAID AS IF SOMETHING AWFUL MIGHT HAPPEN: 0
GAD7 TOTAL SCORE: 0

## 2022-09-02 ASSESSMENT — PATIENT HEALTH QUESTIONNAIRE - PHQ9
SUM OF ALL RESPONSES TO PHQ QUESTIONS 1-9: 0
3. TROUBLE FALLING OR STAYING ASLEEP: 0
9. THOUGHTS THAT YOU WOULD BE BETTER OFF DEAD, OR OF HURTING YOURSELF: 0
SUM OF ALL RESPONSES TO PHQ QUESTIONS 1-9: 0
1. LITTLE INTEREST OR PLEASURE IN DOING THINGS: 0
6. FEELING BAD ABOUT YOURSELF - OR THAT YOU ARE A FAILURE OR HAVE LET YOURSELF OR YOUR FAMILY DOWN: 0
7. TROUBLE CONCENTRATING ON THINGS, SUCH AS READING THE NEWSPAPER OR WATCHING TELEVISION: 0
5. POOR APPETITE OR OVEREATING: 0
SUM OF ALL RESPONSES TO PHQ QUESTIONS 1-9: 0
8. MOVING OR SPEAKING SO SLOWLY THAT OTHER PEOPLE COULD HAVE NOTICED. OR THE OPPOSITE, BEING SO FIGETY OR RESTLESS THAT YOU HAVE BEEN MOVING AROUND A LOT MORE THAN USUAL: 0
SUM OF ALL RESPONSES TO PHQ9 QUESTIONS 1 & 2: 0
4. FEELING TIRED OR HAVING LITTLE ENERGY: 0
2. FEELING DOWN, DEPRESSED OR HOPELESS: 0
SUM OF ALL RESPONSES TO PHQ QUESTIONS 1-9: 0
10. IF YOU CHECKED OFF ANY PROBLEMS, HOW DIFFICULT HAVE THESE PROBLEMS MADE IT FOR YOU TO DO YOUR WORK, TAKE CARE OF THINGS AT HOME, OR GET ALONG WITH OTHER PEOPLE: 0

## 2022-09-02 NOTE — PROGRESS NOTES
Jorge Alberto Saunders is a [de-identified] y.o. male    Chief Complaint   Patient presents with    Diabetes    Hypertension    Hyperlipidemia       HPI:    Diabetes  He presents for his follow-up diabetic visit. He has type 2 diabetes mellitus. His disease course has been stable. Pertinent negatives for hypoglycemia include no dizziness. Pertinent negatives for diabetes include no polydipsia. Risk factors for coronary artery disease include male sex, diabetes mellitus and hypertension. Current diabetic treatment includes oral agent (monotherapy). An ACE inhibitor/angiotensin II receptor blocker is being taken. Hypertension  This is a chronic problem. The current episode started more than 1 year ago. The problem is unchanged. The problem is controlled. Risk factors for coronary artery disease include diabetes mellitus. Past treatments include ACE inhibitors, beta blockers and diuretics. The current treatment provides significant improvement. There are no compliance problems. Hyperlipidemia  This is a chronic problem. The current episode started more than 1 year ago. The problem is controlled. Recent lipid tests were reviewed and are normal. Exacerbating diseases include diabetes. Pertinent negatives include no myalgias. Current antihyperlipidemic treatment includes statins. The current treatment provides significant improvement of lipids. There are no compliance problems. Risk factors for coronary artery disease include diabetes mellitus, hypertension, male sex and obesity. ROS:    Review of Systems   Endocrine: Negative for polydipsia. Musculoskeletal:  Negative for myalgias. Neurological:  Negative for dizziness. /72   Pulse 52   Wt 226 lb (102.5 kg)   SpO2 95%   BMI 33.37 kg/m²     Physical Exam:    Physical Exam  Constitutional:       General: He is not in acute distress. Appearance: Normal appearance. He is obese. He is not ill-appearing or toxic-appearing. HENT:      Head: Normocephalic. Neurological:      Mental Status: He is alert. Psychiatric:         Mood and Affect: Mood normal.         Behavior: Behavior normal.         Thought Content: Thought content normal.       Current Outpatient Medications   Medication Sig Dispense Refill    atorvastatin (LIPITOR) 40 MG tablet TAKE 1 TABLET BY MOUTH EVERY DAY 90 tablet 1    lisinopril (PRINIVIL;ZESTRIL) 20 MG tablet TAKE 1/2 TABLET (10 mg) BY MOUTH ONE TIME A DAY 45 tablet 1    metFORMIN (GLUCOPHAGE) 500 MG tablet TAKE 1 TABLET BY MOUTH EVERY DAY with breakfast 90 tablet 1    apixaban (ELIQUIS) 5 MG TABS tablet TAKE 1 TABLET BY MOUTH TWO TIMES A  tablet 3    atenolol-chlorthalidone (TENORETIC) 100-25 MG per tablet TAKE 1 TABLET BY MOUTH ONE TIME A DAY 90 tablet 1    Multiple Vitamins-Minerals (THERAPEUTIC MULTIVITAMIN-MINERALS) tablet Take 1 tablet by mouth daily        No current facility-administered medications for this visit. Assessment:    1. Type 2 diabetes mellitus without complication, without long-term current use of insulin ([facility] [address]    2. Essential hypertension    3. Mixed hyperlipidemia    4. Vitamin D deficiency    5. Screening PSA (prostate specific antigen)        Plan:     1. Type 2 diabetes mellitus without complication, without long-term current use of insulin ([facility])  Stable. Continue current medications. - POCT glycosylated hemoglobin (Hb A1C) 5.5  - metFORMIN (GLUCOPHAGE) 500 MG tablet; Take 1 tablet by mouth 2 times daily (with meals)  Dispense: 180 tablet; Refill: 3  - CBC Auto Differential  - Comprehensive Metabolic Panel    2. Essential hypertension  Stable. Continue current medications. - atenolol-chlorthalidone (TENORETIC) 100-25 MG per tablet; Take 1 tablet by mouth daily  Dispense: 90 tablet; Refill: 3  - lisinopril (PRINIVIL;ZESTRIL) 20 MG tablet; Take 1 tablet by mouth daily  Dispense: 90 tablet; Refill: 3  - CBC Auto Differential  - Comprehensive Metabolic Panel    3. Mixed hyperlipidemia  Stable.  Continue current medications. - atorvastatin (LIPITOR) 40 MG tablet; Take 1 tablet by mouth daily  Dispense: 90 tablet; Refill: 3  - CBC Auto Differential  - Comprehensive Metabolic Panel  - Lipid Panel    Atrial fibrillation is stable. He sees cardiology. Return in about 6 months (around 3/2/2023) for Diabetes, Hypertension, Hyperlipidemia.

## 2022-09-03 DIAGNOSIS — R74.8 ELEVATED ALKALINE PHOSPHATASE LEVEL: Primary | ICD-10-CM

## 2022-09-03 DIAGNOSIS — R74.8 ELEVATED ALKALINE PHOSPHATASE LEVEL: ICD-10-CM

## 2022-09-03 LAB
A/G RATIO: 1.8 (ref 1.1–2.2)
ALBUMIN SERPL-MCNC: 4.4 G/DL (ref 3.4–5)
ALP BLD-CCNC: 250 U/L (ref 40–129)
ALT SERPL-CCNC: 29 U/L (ref 10–40)
ANION GAP SERPL CALCULATED.3IONS-SCNC: 14 MMOL/L (ref 3–16)
AST SERPL-CCNC: 24 U/L (ref 15–37)
BILIRUB SERPL-MCNC: 2.3 MG/DL (ref 0–1)
BUN BLDV-MCNC: 15 MG/DL (ref 7–20)
CALCIUM SERPL-MCNC: 9.8 MG/DL (ref 8.3–10.6)
CHLORIDE BLD-SCNC: 99 MMOL/L (ref 99–110)
CHOLESTEROL, TOTAL: 105 MG/DL (ref 0–199)
CO2: 25 MMOL/L (ref 21–32)
CREAT SERPL-MCNC: 0.6 MG/DL (ref 0.8–1.3)
FOLATE: >20 NG/ML (ref 4.78–24.2)
GFR AFRICAN AMERICAN: >60
GFR NON-AFRICAN AMERICAN: >60
GLUCOSE BLD-MCNC: 88 MG/DL (ref 70–99)
HDLC SERPL-MCNC: 46 MG/DL (ref 40–60)
LDL CHOLESTEROL CALCULATED: 50 MG/DL
POTASSIUM SERPL-SCNC: 4.5 MMOL/L (ref 3.5–5.1)
PROSTATE SPECIFIC ANTIGEN: 1.6 NG/ML (ref 0–4)
SODIUM BLD-SCNC: 138 MMOL/L (ref 136–145)
TOTAL PROTEIN: 6.9 G/DL (ref 6.4–8.2)
TRIGL SERPL-MCNC: 44 MG/DL (ref 0–150)
TSH REFLEX: 3.15 UIU/ML (ref 0.27–4.2)
VITAMIN B-12: 706 PG/ML (ref 211–911)
VITAMIN D 25-HYDROXY: 33.8 NG/ML
VLDLC SERPL CALC-MCNC: 9 MG/DL

## 2022-09-08 LAB
ALK PHOS OTHER CALC: 0 U/L
ALK PHOSPHATASE: 265 U/L (ref 40–120)
ALKALINE PHOSPHATASE BONE FRACTION: 32 U/L (ref 0–55)
ALKALINE PHOSPHATASE LIVER FRACTION: 233 U/L (ref 0–94)

## 2022-09-09 ENCOUNTER — HOSPITAL ENCOUNTER (OUTPATIENT)
Dept: ULTRASOUND IMAGING | Age: 82
Discharge: HOME | End: 2022-09-09
Payer: MEDICARE

## 2022-09-09 DIAGNOSIS — R74.8 ELEVATED ALKALINE PHOSPHATASE LEVEL: ICD-10-CM

## 2022-09-09 PROCEDURE — 76705 ECHO EXAM OF ABDOMEN: CPT

## 2022-09-13 ENCOUNTER — TELEPHONE (OUTPATIENT)
Dept: FAMILY MEDICINE CLINIC | Age: 82
End: 2022-09-13

## 2022-09-13 NOTE — TELEPHONE ENCOUNTER
Dr. Nora Shields is out of the office the month of September. Pt has appointment with Dr. Wild Palma 10/ 10 for Diabetes pt can discuss then.

## 2022-09-13 NOTE — TELEPHONE ENCOUNTER
----- Message from Regine Jung sent at 9/13/2022  3:50 PM EDT -----  Subject: Results Request    QUESTIONS  Results: ultrasound; Ordered by:  Silvia Mac Performed: 2022-09-09  ---------------------------------------------------------------------------  --------------  Shiraz AMADOR    [phone]; OK to leave message on voicemail  ---------------------------------------------------------------------------  --------------

## 2022-10-04 NOTE — PROGRESS NOTES
[facility]   Cardiac Follow Up    Referring Provider:  Lorenzo Guerrero DO   Originally saw 1/20  C/O baseline TELLEZ today     Subjective: Daniel Aaron presents for cardiology follow up AF, HTN, HLD. History of Present Illness:    Daniel Aaron is a [de-identified] y.o. male who has PMH chronic afib dx 2/21 on eliquis, arthritis, s/p left THR 12/21, gout, DM, HTN, HLD, moderate AS, s/p L THR 12/21, chronic LE edema, and prostate cancer s/p radiation seeds. Note ECHO on 1/10/2020 EF=55-60%, mild to mod AS (velocity=2.99m/s; MPG=19mmHg). Most recent camille nuc 8/2015 normal.  SUKHDEV 2/2021 showed 100% AF and 4% PVC burden. Most recent EKG 10/14/2021 AF, IVCD, anterior infarct, 57 BPM.     Note ECHO 3/21 showed moderate AS (velocity=3.83m/s; MPG=36mmHg). Due to worsened AS with quick progression from 1/20 study I referred to Dr. Kevin Saunders TAVR clinic. He felt AS moderate and did not require TAVR. Note Echo 9/7/2021 AV max velocity of 3.49 m/sec and MPG of 29 mmHg c/w moderate AS. Most recent Echo 4/12/22 normal EF of 55% and moderate AS (velocity is 3.33m/s and MPG 25mmHg). No change in AS over last couple of studies. Today, patient reports he is feeling good overall. He stays active by walking with his wife and dog he experiences mild SOB when walking up inclines but this is not a new issue. He stated he made his trip to [place] but caught Covid and had to quarantine there till he was clear to fly home. Patient is taking all cardiac medications as prescribed and tolerates them well. Patient denies current edema, chest pain, sob, palpitations, dizziness or syncope. Originally from [facility] and is a retired . Weight today is 234# ( up 6# from 4/2022)       Past Medical History:   has a past medical history of Arthritis, Chronic gout without tophus, Diabetes mellitus ([address] ED (erectile dysfunction), Erectile dysfunction, Hyperlipidemia, Hypertension, Nocturia, Pre-diabetes, and Prostate CA ([facility]     Surgical History:   has a past surgical history that includes Prostate surgery; Tonsillectomy; Knee arthroscopy; knee surgery (Left, 1963); Colonoscopy (9/24/14); joint replacement (Left, 9/1/2015); Cataract removal (Bilateral, 03/2017); and Total hip arthroplasty (Left, 12/13/2021). Social History:   He is  with one son. He is a retired . He drinks alcohol- 2-3 drinks of high balls a day. no drug use. Reports that he has never smoked. He has never used smokeless tobacco. He reports current alcohol use. He reports that he does not use drugs. Family History:  family history includes Cancer in his father. No significant heart disease history in parents    Home Medications:  Prior to Admission medications    Medication Sig Start Date End Date Taking? Authorizing Provider   atorvastatin (LIPITOR) 40 MG tablet TAKE 1 TABLET BY MOUTH EVERY DAY 9/2/22   Camilla Rios, DO   lisinopril (PRINIVIL;ZESTRIL) 20 MG tablet TAKE 1/2 TABLET (10 mg) BY MOUTH ONE TIME A DAY 9/2/22   Camilla Rios, DO   metFORMIN (GLUCOPHAGE) 500 MG tablet TAKE 1 TABLET BY MOUTH EVERY DAY with breakfast 8/23/22   Camilla Rios, DO   apixaban (ELIQUIS) 5 MG TABS tablet TAKE 1 TABLET BY MOUTH TWO TIMES A DAY 5/25/22   Destiny Crain MD   atenolol-chlorthalidone (TENORETIC) 100-25 MG per tablet TAKE 1 TABLET BY MOUTH ONE TIME A DAY 5/10/22   Camilla Rios, DO   Multiple Vitamins-Minerals (THERAPEUTIC MULTIVITAMIN-MINERALS) tablet Take 1 tablet by mouth daily     Historical Provider, MD        Allergies:  Bactrim [sulfamethoxazole-trimethoprim] and Pcn [penicillins]     Review of Systems:   Constitutional: there has been no unanticipated weight loss. There's been no change in energy level, sleep pattern, or activity level. Eyes: No visual changes or diplopia. No scleral icterus. ENT: No Headaches, hearing loss or vertigo. No mouth sores or sore throat.   Cardiovascular: Reviewed in HPI  Respiratory: No cough or wheezing, no sputum production. No hematemesis. Gastrointestinal: No abdominal pain, appetite loss, blood in stools. No change in bowel or bladder habits. Genitourinary: No dysuria, trouble voiding, or hematuria. Musculoskeletal:  No gait disturbance, weakness or joint complaints. Integumentary: No rash or pruritis. Neurological: No headache, diplopia, change in muscle strength, numbness or tingling. No change in gait, balance, coordination, mood, affect, memory, mentation, behavior. Psychiatric: No anxiety, no depression. Endocrine: No malaise, fatigue or temperature intolerance. No excessive thirst, fluid intake, or urination. No tremor. Hematologic/Lymphatic: No abnormal bruising or bleeding, blood clots or swollen lymph nodes. Allergic/Immunologic: No nasal congestion or hives. Physical Examination:    Vitals:    10/06/22 1331   BP: 137/60   Pulse: (!) 49   SpO2: 96%     Constitutional and General Appearance: NAD   Respiratory:  Normal excursion and expansion without use of accessory muscles  Resp Auscultation: Normal breath sounds without dullness Clear  Cardiovascular: The apical impulses not displaced  Heart tones are crisp and normal  Cervical veins are not engorged  The carotid upstroke is normal in amplitude and contour without delay or bruit; +radiating AS murmur   Irregular rhythm, II/VI harsh MISTI  Peripheral pulses are symmetrical and full  There is no clubbing, cyanosis of the extremities. 1-2 + BLE edema  Femoral Arteries: 2+ and equal  Pedal Pulses: 2+ and equal   Abdomen:  No masses or tenderness  Liver/Spleen: No Abnormalities Noted  Neurological/Psychiatric:  Alert and oriented in all spheres  Moves all extremities well  Exhibits normal gait balance and coordination  No abnormalities of mood, affect, memory, mentation, or behavior are noted  Skin:  Skin: warm and dry.     Lab Results   Component Value Date    CHOL 105 09/02/2022    CHOL 136 11/19/2019    CHOL 136 12/06/2017     Lab Results Component Value Date    TRIG 44 09/02/2022    TRIG 148 11/19/2019    TRIG 118 12/06/2017     Lab Results   Component Value Date    HDL 46 09/02/2022    HDL 39 (L) 03/11/2021    HDL 54 11/19/2019     Lab Results   Component Value Date    LDLCALC 50 09/02/2022    LDLCALC 65 03/11/2021    [address] 11/19/2019     Lab Results   Component Value Date    LABVLDL 9 09/02/2022    LABVLDL 15 03/11/2021    LABVLDL 30 11/19/2019     No results found for: CHOLHDLRATIO    [id number] Score for Atrial Fibrillation Stroke Risk   Risk   Factors  Component Value   C CHF No 0   H HTN Yes 1   A2 Age >= 76 Yes,  [de-identified] y.o.) 2   D DM Yes 1   S2 Prior Stroke/TIA No 0   V Vascular Disease No 0   A Age 74-69 No,  [de-identified] y.o.) 0   Sc Sex male 0    [id number]  Score  4   Score last updated 10/14/21 1:38 PM EDT    Assessment:     1. Atrial fibrillation: Persistent. He wore a cardiac event monitor 2/2021 that showed 100% AF and 4% PVC burden. 2. Aortic stenosis: Most recent ECHO 4/22 normal EF=55%; moderate AS (velocity=3.33m/s; MPG=25mmHg (no sig change 9/7/2021)  No change in AS over last couple of studies. Likely repeat  2 years. No concerning CHF symptoms. He has been educated about symptoms (CP, SOB, near syncope/syncope) and to call asap if occurs. 3. Hypertension: Well controlled and will continue current medical regimen. 4. Hyperlipidemia: per PCP. I personally reviewed most recent lipids from 9/2/22 in Epic (see above). Well controlled and will continue current medical regimen of lipitor 40mg daily. {\PCP to recheck. 5. Diabetes mellitus: per PCP     6. SOB: Resolved. No complaints today    Plan:  Current medications reviewed. No changes at this time. Refills given as warranted. Labs reviewed  Echo in 2 years to assess moderate Aortic Stenosis (Last Echo April 2022)  Reduce your salt intake as this is will help reduce swelling in legs.    He has LE edema likely venous insufficiency but does not bother him and does not want diuretic. Follow up with me in 1 year. Check EKG at next OV 2023. This note has been scribed in the presence of Carron Rubinstein, MD, by Jaimie Brooks RN.     I, Dr. Carron Rubinstein, personally performed the services described in this documentation, as scribed by the above signed scribe in my presence. It is both accurate and complete to my knowledge. I agree with the details independently gathered by the clinical support staff, while the remaining scribed note accurately describes my personal service to the patient. Cost of prescription medications and patient compliance have been reviewed with patient. All questions answered. Thank you for allowing me to participate in the care of this individual.    Beny Byrne.  Tiffanie Hurst M.D., [facility]

## 2022-10-06 ENCOUNTER — OFFICE VISIT (OUTPATIENT)
Dept: CARDIOLOGY CLINIC | Age: 82
End: 2022-10-06
Payer: MEDICARE

## 2022-10-06 VITALS
DIASTOLIC BLOOD PRESSURE: 60 MMHG | BODY MASS INDEX: 34.66 KG/M2 | HEART RATE: 49 BPM | OXYGEN SATURATION: 96 % | SYSTOLIC BLOOD PRESSURE: 137 MMHG | HEIGHT: 69 IN | WEIGHT: 234 LBS

## 2022-10-06 DIAGNOSIS — E78.2 MIXED HYPERLIPIDEMIA: ICD-10-CM

## 2022-10-06 DIAGNOSIS — I10 ESSENTIAL HYPERTENSION: ICD-10-CM

## 2022-10-06 PROCEDURE — 99214 OFFICE O/P EST MOD 30 MIN: CPT | Performed by: INTERNAL MEDICINE

## 2022-10-06 PROCEDURE — 1123F ACP DISCUSS/DSCN MKR DOCD: CPT | Performed by: INTERNAL MEDICINE

## 2022-10-06 NOTE — LETTER
[address] Cardiology - [address] [facility]  Phone: [phone]  Fax: [phone]    Joseph Barlow MD    October 6, 2022     DO Jeison Or[address] [facility] [address]    Patient: Monika Polanco   MR Number: [id number]   YOB: 1940   Date of Visit: 10/6/2022       Dear Chantel Josue: Thank you for referring Albert Hollis to me for evaluation/treatment. Below are the relevant portions of my assessment and plan of care. If you have questions, please do not hesitate to call me. I look forward to following Bonnie Fleming along with you.     Sincerely,      Joseph Barlow MD

## 2022-10-10 ENCOUNTER — OFFICE VISIT (OUTPATIENT)
Dept: FAMILY MEDICINE CLINIC | Age: 82
End: 2022-10-10
Payer: MEDICARE

## 2022-10-10 VITALS
DIASTOLIC BLOOD PRESSURE: 70 MMHG | BODY MASS INDEX: 34.56 KG/M2 | SYSTOLIC BLOOD PRESSURE: 132 MMHG | WEIGHT: 234 LBS | OXYGEN SATURATION: 97 % | HEART RATE: 47 BPM

## 2022-10-10 DIAGNOSIS — E11.9 TYPE 2 DIABETES MELLITUS WITHOUT COMPLICATION, WITHOUT LONG-TERM CURRENT USE OF INSULIN (HCC): Primary | ICD-10-CM

## 2022-10-10 DIAGNOSIS — I10 ESSENTIAL HYPERTENSION: ICD-10-CM

## 2022-10-10 DIAGNOSIS — R74.8 ELEVATED ALKALINE PHOSPHATASE LEVEL: ICD-10-CM

## 2022-10-10 DIAGNOSIS — E78.2 MIXED HYPERLIPIDEMIA: ICD-10-CM

## 2022-10-10 PROCEDURE — 99214 OFFICE O/P EST MOD 30 MIN: CPT | Performed by: FAMILY MEDICINE

## 2022-10-10 PROCEDURE — 3044F HG A1C LEVEL LT 7.0%: CPT | Performed by: FAMILY MEDICINE

## 2022-10-10 PROCEDURE — 1123F ACP DISCUSS/DSCN MKR DOCD: CPT | Performed by: FAMILY MEDICINE

## 2022-10-10 ASSESSMENT — PATIENT HEALTH QUESTIONNAIRE - PHQ9
7. TROUBLE CONCENTRATING ON THINGS, SUCH AS READING THE NEWSPAPER OR WATCHING TELEVISION: 0
5. POOR APPETITE OR OVEREATING: 0
3. TROUBLE FALLING OR STAYING ASLEEP: 0
9. THOUGHTS THAT YOU WOULD BE BETTER OFF DEAD, OR OF HURTING YOURSELF: 0
4. FEELING TIRED OR HAVING LITTLE ENERGY: 0
SUM OF ALL RESPONSES TO PHQ9 QUESTIONS 1 & 2: 0
SUM OF ALL RESPONSES TO PHQ QUESTIONS 1-9: 0
8. MOVING OR SPEAKING SO SLOWLY THAT OTHER PEOPLE COULD HAVE NOTICED. OR THE OPPOSITE, BEING SO FIGETY OR RESTLESS THAT YOU HAVE BEEN MOVING AROUND A LOT MORE THAN USUAL: 0
2. FEELING DOWN, DEPRESSED OR HOPELESS: 0
SUM OF ALL RESPONSES TO PHQ QUESTIONS 1-9: 0
10. IF YOU CHECKED OFF ANY PROBLEMS, HOW DIFFICULT HAVE THESE PROBLEMS MADE IT FOR YOU TO DO YOUR WORK, TAKE CARE OF THINGS AT HOME, OR GET ALONG WITH OTHER PEOPLE: 0
SUM OF ALL RESPONSES TO PHQ QUESTIONS 1-9: 0
1. LITTLE INTEREST OR PLEASURE IN DOING THINGS: 0
6. FEELING BAD ABOUT YOURSELF - OR THAT YOU ARE A FAILURE OR HAVE LET YOURSELF OR YOUR FAMILY DOWN: 0
SUM OF ALL RESPONSES TO PHQ QUESTIONS 1-9: 0

## 2022-10-10 ASSESSMENT — ANXIETY QUESTIONNAIRES
4. TROUBLE RELAXING: 0
2. NOT BEING ABLE TO STOP OR CONTROL WORRYING: 0
6. BECOMING EASILY ANNOYED OR IRRITABLE: 0
1. FEELING NERVOUS, ANXIOUS, OR ON EDGE: 0
5. BEING SO RESTLESS THAT IT IS HARD TO SIT STILL: 0
3. WORRYING TOO MUCH ABOUT DIFFERENT THINGS: 0
IF YOU CHECKED OFF ANY PROBLEMS ON THIS QUESTIONNAIRE, HOW DIFFICULT HAVE THESE PROBLEMS MADE IT FOR YOU TO DO YOUR WORK, TAKE CARE OF THINGS AT HOME, OR GET ALONG WITH OTHER PEOPLE: NOT DIFFICULT AT ALL
7. FEELING AFRAID AS IF SOMETHING AWFUL MIGHT HAPPEN: 0
GAD7 TOTAL SCORE: 0

## 2022-10-10 NOTE — PROGRESS NOTES
Luz Collins is a 81 y.o. male    Chief Complaint   Patient presents with    Diabetes    Ultrasound     Gallbladder       HPI:    Diabetes  He presents for his follow-up diabetic visit. He has type 2 diabetes mellitus. His disease course has been stable. Pertinent negatives for hypoglycemia include no dizziness. Pertinent negatives for diabetes include no polydipsia. Risk factors for coronary artery disease include male sex, diabetes mellitus and hypertension. Current diabetic treatment includes oral agent (monotherapy). An ACE inhibitor/angiotensin II receptor blocker is being taken. Hypertension  This is a chronic problem. The current episode started more than 1 year ago. The problem is unchanged. The problem is controlled. Risk factors for coronary artery disease include diabetes mellitus. Past treatments include ACE inhibitors, beta blockers and diuretics. The current treatment provides significant improvement. There are no compliance problems. Hyperlipidemia  This is a chronic problem. The current episode started more than 1 year ago. The problem is controlled. Recent lipid tests were reviewed and are normal. Exacerbating diseases include diabetes. Pertinent negatives include no myalgias. Current antihyperlipidemic treatment includes statins. The current treatment provides significant improvement of lipids. There are no compliance problems. Risk factors for coronary artery disease include diabetes mellitus, hypertension, male sex and obesity. Patient was found to have elevated alkaline phosphatase. The follow-up alkaline phosphatase labs showed it was elevated in the liver. He has no right upper quadrant abdominal pain. No family history of gallbladder removal.  No pain after eating. The RUQ ultrasound appeared to have been completely normal.  There is no abnormal findings of the liver or or gallbladder. ROS:    Review of Systems   Endocrine: Negative for polydipsia.    Musculoskeletal: Negative for myalgias. Neurological:  Negative for dizziness. /70   Pulse (!) 47   Wt 234 lb (106.1 kg)   SpO2 97%   BMI 34.56 kg/m²     Physical Exam:    Physical Exam  Constitutional:       General: He is not in acute distress. Appearance: Normal appearance. He is obese. He is not ill-appearing or toxic-appearing. HENT:      Head: Normocephalic. Neurological:      Mental Status: He is alert. Psychiatric:         Mood and Affect: Mood normal.         Behavior: Behavior normal.         Thought Content: Thought content normal.       Current Outpatient Medications   Medication Sig Dispense Refill    apixaban (ELIQUIS) 5 MG TABS tablet TAKE 1 TABLET BY MOUTH TWO TIMES A  tablet 3    atorvastatin (LIPITOR) 40 MG tablet TAKE 1 TABLET BY MOUTH EVERY DAY 90 tablet 1    lisinopril (PRINIVIL;ZESTRIL) 20 MG tablet TAKE 1/2 TABLET (10 mg) BY MOUTH ONE TIME A DAY 45 tablet 1    metFORMIN (GLUCOPHAGE) 500 MG tablet TAKE 1 TABLET BY MOUTH EVERY DAY with breakfast 90 tablet 1    atenolol-chlorthalidone (TENORETIC) 100-25 MG per tablet TAKE 1 TABLET BY MOUTH ONE TIME A DAY 90 tablet 1    Multiple Vitamins-Minerals (THERAPEUTIC MULTIVITAMIN-MINERALS) tablet Take 1 tablet by mouth daily        No current facility-administered medications for this visit. Assessment:    1. Type 2 diabetes mellitus without complication, without long-term current use of insulin ([address]    2. Essential hypertension    3. Mixed hyperlipidemia    4. Elevated alkaline phosphatase level        Plan:     1. Type 2 diabetes mellitus without complication, without long-term current use of insulin ([facility])  Stable. Continue current medications. - POCT glycosylated hemoglobin (Hb A1C) 5.5  - metFORMIN (GLUCOPHAGE) 500 MG tablet; Take 1 tablet by mouth 2 times daily (with meals)  Dispense: 180 tablet; Refill: 3  - CBC Auto Differential  - Comprehensive Metabolic Panel    2. Essential hypertension  Stable.  Continue current medications. - atenolol-chlorthalidone (TENORETIC) 100-25 MG per tablet; Take 1 tablet by mouth daily  Dispense: 90 tablet; Refill: 3  - lisinopril (PRINIVIL;ZESTRIL) 20 MG tablet; Take 1 tablet by mouth daily  Dispense: 90 tablet; Refill: 3  - CBC Auto Differential  - Comprehensive Metabolic Panel    3. Mixed hyperlipidemia  Stable. Continue current medications. - atorvastatin (LIPITOR) 40 MG tablet; Take 1 tablet by mouth daily  Dispense: 90 tablet; Refill: 3  - CBC Auto Differential  - Comprehensive Metabolic Panel  - Lipid Panel    4. Elevated alkaline phosphatase level  The liver component was elevated. He is asymptomatic. Right upper quadrant ultrasound was normal.  We will thus continue to monitor for now. Return if symptoms worsen or fail to improve.

## 2022-10-28 ENCOUNTER — TELEPHONE (OUTPATIENT)
Dept: FAMILY MEDICINE CLINIC | Age: 82
End: 2022-10-28

## 2023-02-27 ENCOUNTER — OFFICE VISIT (OUTPATIENT)
Dept: FAMILY MEDICINE CLINIC | Age: 83
End: 2023-02-27

## 2023-02-27 VITALS — BODY MASS INDEX: 34.41 KG/M2 | SYSTOLIC BLOOD PRESSURE: 116 MMHG | WEIGHT: 233 LBS | DIASTOLIC BLOOD PRESSURE: 60 MMHG

## 2023-02-27 DIAGNOSIS — I48.91 ATRIAL FIBRILLATION, UNSPECIFIED TYPE (HCC): ICD-10-CM

## 2023-02-27 DIAGNOSIS — I10 ESSENTIAL HYPERTENSION: ICD-10-CM

## 2023-02-27 DIAGNOSIS — E78.2 MIXED HYPERLIPIDEMIA: ICD-10-CM

## 2023-02-27 DIAGNOSIS — E11.9 TYPE 2 DIABETES MELLITUS WITHOUT COMPLICATION, WITHOUT LONG-TERM CURRENT USE OF INSULIN (HCC): Primary | ICD-10-CM

## 2023-02-27 DIAGNOSIS — R73.03 PRE-DIABETES: ICD-10-CM

## 2023-02-27 LAB — HBA1C MFR BLD: 5.5 %

## 2023-02-27 RX ORDER — ATORVASTATIN CALCIUM 40 MG/1
TABLET, FILM COATED ORAL
Qty: 90 TABLET | Refills: 1 | Status: SHIPPED | OUTPATIENT
Start: 2023-02-27

## 2023-02-27 ASSESSMENT — ANXIETY QUESTIONNAIRES
7. FEELING AFRAID AS IF SOMETHING AWFUL MIGHT HAPPEN: 0
3. WORRYING TOO MUCH ABOUT DIFFERENT THINGS: 0
5. BEING SO RESTLESS THAT IT IS HARD TO SIT STILL: 0
1. FEELING NERVOUS, ANXIOUS, OR ON EDGE: 0
4. TROUBLE RELAXING: 0
IF YOU CHECKED OFF ANY PROBLEMS ON THIS QUESTIONNAIRE, HOW DIFFICULT HAVE THESE PROBLEMS MADE IT FOR YOU TO DO YOUR WORK, TAKE CARE OF THINGS AT HOME, OR GET ALONG WITH OTHER PEOPLE: NOT DIFFICULT AT ALL
6. BECOMING EASILY ANNOYED OR IRRITABLE: 0
2. NOT BEING ABLE TO STOP OR CONTROL WORRYING: 0
GAD7 TOTAL SCORE: 0

## 2023-02-27 ASSESSMENT — PATIENT HEALTH QUESTIONNAIRE - PHQ9
2. FEELING DOWN, DEPRESSED OR HOPELESS: 0
3. TROUBLE FALLING OR STAYING ASLEEP: 0
SUM OF ALL RESPONSES TO PHQ QUESTIONS 1-9: 0
6. FEELING BAD ABOUT YOURSELF - OR THAT YOU ARE A FAILURE OR HAVE LET YOURSELF OR YOUR FAMILY DOWN: 0
SUM OF ALL RESPONSES TO PHQ QUESTIONS 1-9: 0
SUM OF ALL RESPONSES TO PHQ QUESTIONS 1-9: 0
8. MOVING OR SPEAKING SO SLOWLY THAT OTHER PEOPLE COULD HAVE NOTICED. OR THE OPPOSITE, BEING SO FIGETY OR RESTLESS THAT YOU HAVE BEEN MOVING AROUND A LOT MORE THAN USUAL: 0
1. LITTLE INTEREST OR PLEASURE IN DOING THINGS: 0
10. IF YOU CHECKED OFF ANY PROBLEMS, HOW DIFFICULT HAVE THESE PROBLEMS MADE IT FOR YOU TO DO YOUR WORK, TAKE CARE OF THINGS AT HOME, OR GET ALONG WITH OTHER PEOPLE: 0
SUM OF ALL RESPONSES TO PHQ9 QUESTIONS 1 & 2: 0
7. TROUBLE CONCENTRATING ON THINGS, SUCH AS READING THE NEWSPAPER OR WATCHING TELEVISION: 0
SUM OF ALL RESPONSES TO PHQ QUESTIONS 1-9: 0
5. POOR APPETITE OR OVEREATING: 0
9. THOUGHTS THAT YOU WOULD BE BETTER OFF DEAD, OR OF HURTING YOURSELF: 0
4. FEELING TIRED OR HAVING LITTLE ENERGY: 0

## 2023-02-27 NOTE — PROGRESS NOTES
Sulema Velez is a 81 y.o. male    Chief Complaint   Patient presents with    Diabetes    Hypertension    Hyperlipidemia       HPI:    Diabetes  He presents for his follow-up diabetic visit. He has type 2 diabetes mellitus. His disease course has been stable. Pertinent negatives for hypoglycemia include no dizziness. Pertinent negatives for diabetes include no polydipsia. Risk factors for coronary artery disease include male sex, diabetes mellitus and hypertension. Current diabetic treatment includes oral agent (monotherapy). An ACE inhibitor/angiotensin II receptor blocker is being taken. Hypertension  This is a chronic problem. The current episode started more than 1 year ago. The problem is unchanged. The problem is controlled. Risk factors for coronary artery disease include diabetes mellitus. Past treatments include ACE inhibitors, beta blockers and diuretics. The current treatment provides significant improvement. There are no compliance problems. Hyperlipidemia  This is a chronic problem. The current episode started more than 1 year ago. The problem is controlled. Recent lipid tests were reviewed and are normal. Exacerbating diseases include diabetes. Pertinent negatives include no myalgias. Current antihyperlipidemic treatment includes statins. The current treatment provides significant improvement of lipids. There are no compliance problems. Risk factors for coronary artery disease include diabetes mellitus, hypertension, male sex and obesity. ROS:    Review of Systems   Endocrine: Negative for polydipsia. Musculoskeletal:  Negative for myalgias. Neurological:  Negative for dizziness. /60   Wt 233 lb (105.7 kg)   BMI 34.41 kg/m²     Physical Exam:    Physical Exam  Constitutional:       General: He is not in acute distress. Appearance: Normal appearance. He is obese. He is not ill-appearing or toxic-appearing. HENT:      Head: Normocephalic.    Neurological:      Mental Status: He is alert. Psychiatric:         Mood and Affect: Mood normal.         Behavior: Behavior normal.         Thought Content: Thought content normal.       Current Outpatient Medications   Medication Sig Dispense Refill    metFORMIN (GLUCOPHAGE) 500 MG tablet TAKE 1 TABLET BY MOUTH EVERY DAY with breakfast 90 tablet 1    atorvastatin (LIPITOR) 40 MG tablet TAKE 1 TABLET BY MOUTH EVERY DAY 90 tablet 1    apixaban (ELIQUIS) 5 MG TABS tablet TAKE 1 TABLET BY MOUTH TWO TIMES A  tablet 3    lisinopril (PRINIVIL;ZESTRIL) 20 MG tablet TAKE 1/2 TABLET (10 mg) BY MOUTH ONE TIME A DAY 45 tablet 1    atenolol-chlorthalidone (TENORETIC) 100-25 MG per tablet TAKE 1 TABLET BY MOUTH ONE TIME A DAY 90 tablet 1    Multiple Vitamins-Minerals (THERAPEUTIC MULTIVITAMIN-MINERALS) tablet Take 1 tablet by mouth daily        No current facility-administered medications for this visit. Assessment:    1. Type 2 diabetes mellitus without complication, without long-term current use of insulin ([facility]    2. Essential hypertension    3. Mixed hyperlipidemia    4. Atrial fibrillation, unspecified type ([facility]    5. Pre-diabetes          Plan:     1. Type 2 diabetes mellitus without complication, without long-term current use of insulin ([facility])  Stable. Continue current medications. - POCT glycosylated hemoglobin (Hb A1C) 5.5  - metFORMIN (GLUCOPHAGE) 500 MG tablet; Take 1 tablet by mouth 2 times daily (with meals)  Dispense: 180 tablet; Refill: 3  - CBC Auto Differential  - Comprehensive Metabolic Panel    2. Essential hypertension  Stable. Continue current medications. - atenolol-chlorthalidone (TENORETIC) 100-25 MG per tablet; Take 1 tablet by mouth daily  Dispense: 90 tablet; Refill: 3  - lisinopril (PRINIVIL;ZESTRIL) 20 MG tablet; Take 1 tablet by mouth daily  Dispense: 90 tablet; Refill: 3  - CBC Auto Differential  - Comprehensive Metabolic Panel    3. Mixed hyperlipidemia  Stable. Continue current medications.    - atorvastatin (LIPITOR) 40 MG tablet; Take 1 tablet by mouth daily  Dispense: 90 tablet; Refill: 3  - CBC Auto Differential  - Comprehensive Metabolic Panel  - Lipid Panel    Atrial fibrillation is stable.  He sees cardiology.    Return in about 6 months (around 8/27/2023) for Diabetes, Hypertension, Hyperlipidemia.

## 2023-05-04 DIAGNOSIS — I10 ESSENTIAL HYPERTENSION: ICD-10-CM

## 2023-05-04 RX ORDER — LISINOPRIL 20 MG/1
TABLET ORAL
Qty: 45 TABLET | Refills: 1 | Status: SHIPPED | OUTPATIENT
Start: 2023-05-04

## 2023-05-04 NOTE — TELEPHONE ENCOUNTER
.Refill Request     CONFIRM preferred pharmacy with the patient. If Mail Order Rx - Pend for 90 day refill. Last Seen: Last Seen Department: 2/27/2023  Last Seen by PCP: 2/27/2023    Last Written: 9-2-22 45 with 1     If no future appointment scheduled:  Review the last OV with PCP and review information for follow-up visit,  Route STAFF MESSAGE with patient name to the [facility] for scheduling with the following information:            -  Timing of next visit           -  Visit type ie Physical, OV, etc           -  Diagnoses/Reason ie. COPD, HTN - Do not use MEDICATION, Follow-up or CHECK UP - Give reason for visit      Next Appointment:   Future Appointments   Date Time Provider Jeb Pedro   8/29/2023  9:30 AM [address] [place] [address], [facility] [place] - [place]   9/28/2023  1:15 PM MD Katie Israel  SOLITARIO       Message sent to [address] [place] [address] to schedule appt with patient?   N/A      Requested Prescriptions     Pending Prescriptions Disp Refills    lisinopril (PRINIVIL;ZESTRIL) 20 MG tablet [Pharmacy Med Name: Lisinopril Oral Tablet 20 MG] 45 tablet 1     Sig: TAKE 1/2 TABLET BY MOUTH ONE TIME A DAY

## 2023-05-09 DIAGNOSIS — I10 ESSENTIAL HYPERTENSION: ICD-10-CM

## 2023-05-09 NOTE — TELEPHONE ENCOUNTER
Refill Request     CONFIRM preferred pharmacy with the patient. If Mail Order Rx - Pend for 90 day refill. Last Seen: Last Seen Department: 2/27/2023  Last Seen by PCP: 2/27/2023    Last Written: 05/10/2022 90 tablet 1 refills     If no future appointment scheduled:  Review the last OV with PCP and review information for follow-up visit,  Route STAFF MESSAGE with patient name to the [facility] for scheduling with the following information:            -  Timing of next visit           -  Visit type ie Physical, OV, etc           -  Diagnoses/Reason ie. COPD, HTN - Do not use MEDICATION, Follow-up or CHECK UP - Give reason for visit      Next Appointment:   Future Appointments   Date Time Provider Jeb Pedro   8/29/2023  9:30 AM [address] [place] [address] [place]   9/28/2023  1:15 PM MD Staci Anthony [facility]       Message sent to [address] [place] [address] to schedule appt with patient?   NO      Requested Prescriptions     Pending Prescriptions Disp Refills    atenolol-chlorthalidone (TENORETIC) 100-25 MG per tablet [Pharmacy Med Name: Atenolol-Chlorthalidone Oral Tablet 100-25 MG] 90 tablet 0     Sig: TAKE 1 TABLET BY MOUTH ONE TIME A DAY

## 2023-05-11 RX ORDER — ATENOLOL AND CHLORTHALIDONE TABLET 100; 25 MG/1; MG/1
TABLET ORAL
Qty: 90 TABLET | Refills: 0 | Status: SHIPPED | OUTPATIENT
Start: 2023-05-11

## 2023-07-31 DIAGNOSIS — I10 ESSENTIAL HYPERTENSION: ICD-10-CM

## 2023-07-31 RX ORDER — ATENOLOL AND CHLORTHALIDONE TABLET 100; 25 MG/1; MG/1
TABLET ORAL
Qty: 90 TABLET | Refills: 0 | Status: SHIPPED | OUTPATIENT
Start: 2023-07-31

## 2023-07-31 NOTE — TELEPHONE ENCOUNTER
Refill Request     CONFIRM preferred pharmacy with the patient. If Mail Order Rx - Pend for 90 day refill. Last Seen: Last Seen Department: 2/27/2023  Last Seen by PCP: 2/27/2023    Last Written: 5/11/2023    If no future appointment scheduled:  Review the last OV with PCP and review information for follow-up visit,  Route STAFF MESSAGE with patient name to the [facility] for scheduling with the following information:            -  Timing of next visit           -  Visit type ie Physical, OV, etc           -  Diagnoses/Reason ie. COPD, HTN - Do not use MEDICATION, Follow-up or CHECK UP - Give reason for visit      Next Appointment:   Future Appointments   Date Time Provider [address]   8/29/2023  9:30 AM [address] [facility] [place]   9/28/2023  1:15 PM MD Jefferson Victoria [facility]       Message sent to [address] [place] [address] to schedule appt with patient?   NO      Requested Prescriptions     Pending Prescriptions Disp Refills    atenolol-chlorthalidone (TENORETIC) 100-25 MG per tablet [Pharmacy Med Name: Atenolol-Chlorthalidone Oral Tablet 100-25 MG] 90 tablet 0     Sig: TAKE 1 TABLET BY MOUTH EVERY DAY

## 2023-08-14 DIAGNOSIS — R73.03 PRE-DIABETES: ICD-10-CM

## 2023-08-14 NOTE — TELEPHONE ENCOUNTER
Can you see if he can get his LPN AWV done today? He has not been seen for the AWV in a couple of years.

## 2023-08-14 NOTE — TELEPHONE ENCOUNTER
Refill Request     CONFIRM preferred pharmacy with the patient. If Mail Order Rx - Pend for 90 day refill. Last Seen: Last Seen Department: 2/27/2023    Last Seen by PCP: 2/27/2023    Last Written: 2/27/23 90 tablet 1 refill    If no future appointment scheduled:  Review the last OV with PCP and review information for follow-up visit,  Route STAFF MESSAGE with patient name to the [facility] for scheduling with the following information:            -  Timing of next visit           -  Visit type ie Physical, OV, etc           -  Diagnoses/Reason ie. COPD, HTN - Do not use MEDICATION, Follow-up or CHECK UP - Give reason for visit      Next Appointment: 8/29/23  Future Appointments   Date Time Provider [facility] [address]   8/29/2023  9:30 AM [address] [facility] [place]   9/28/2023  1:15 PM MD Taya Santos [facility]       Message sent to [address] [place] [address] to schedule appt with patient?   NO      Requested Prescriptions     Pending Prescriptions Disp Refills    metFORMIN (GLUCOPHAGE) 500 MG tablet [Pharmacy Med Name: metFORMIN HCl Oral Tablet 500 MG] 90 tablet 0     Sig: TAKE 1 TABLET BY MOUTH EVERY DAY with breakfast

## 2023-08-17 ENCOUNTER — TELEMEDICINE (OUTPATIENT)
Dept: FAMILY MEDICINE CLINIC | Age: 83
End: 2023-08-17

## 2023-08-17 DIAGNOSIS — Z00.00 MEDICARE ANNUAL WELLNESS VISIT, SUBSEQUENT: Primary | ICD-10-CM

## 2023-08-17 ASSESSMENT — PATIENT HEALTH QUESTIONNAIRE - PHQ9
SUM OF ALL RESPONSES TO PHQ QUESTIONS 1-9: 0
1. LITTLE INTEREST OR PLEASURE IN DOING THINGS: 0
SUM OF ALL RESPONSES TO PHQ QUESTIONS 1-9: 0
2. FEELING DOWN, DEPRESSED OR HOPELESS: 0
SUM OF ALL RESPONSES TO PHQ9 QUESTIONS 1 & 2: 0
SUM OF ALL RESPONSES TO PHQ QUESTIONS 1-9: 0
SUM OF ALL RESPONSES TO PHQ QUESTIONS 1-9: 0

## 2023-08-17 ASSESSMENT — LIFESTYLE VARIABLES
HOW OFTEN DURING THE LAST YEAR HAVE YOU NEEDED AN ALCOHOLIC DRINK FIRST THING IN THE MORNING TO GET YOURSELF GOING AFTER A NIGHT OF HEAVY DRINKING: 0
HOW OFTEN DO YOU HAVE A DRINK CONTAINING ALCOHOL: 4 OR MORE TIMES A WEEK
HOW OFTEN DURING THE LAST YEAR HAVE YOU FOUND THAT YOU WERE NOT ABLE TO STOP DRINKING ONCE YOU HAD STARTED: 0
HOW OFTEN DURING THE LAST YEAR HAVE YOU BEEN UNABLE TO REMEMBER WHAT HAPPENED THE NIGHT BEFORE BECAUSE YOU HAD BEEN DRINKING: 0
HOW OFTEN DURING THE LAST YEAR HAVE YOU HAD A FEELING OF GUILT OR REMORSE AFTER DRINKING: 0
HOW OFTEN DURING THE LAST YEAR HAVE YOU FAILED TO DO WHAT WAS NORMALLY EXPECTED FROM YOU BECAUSE OF DRINKING: 0
HAS A RELATIVE, FRIEND, DOCTOR, OR ANOTHER HEALTH PROFESSIONAL EXPRESSED CONCERN ABOUT YOUR DRINKING OR SUGGESTED YOU CUT DOWN: 0
HAVE YOU OR SOMEONE ELSE BEEN INJURED AS A RESULT OF YOUR DRINKING: 0
HOW MANY STANDARD DRINKS CONTAINING ALCOHOL DO YOU HAVE ON A TYPICAL DAY: 1 OR 2

## 2023-08-17 NOTE — PROGRESS NOTES
Medicare Annual Wellness Visit    Francesca Meza is here for Medicare AWV    Assessment & Plan   Medicare annual wellness visit, subsequent    Recommendations for Preventive Services Due: see orders and patient instructions/AVS.  Recommended screening schedule for the next 5-10 years is provided to the patient in written form: see Patient Instructions/AVS.     Return if symptoms worsen or fail to improve. Subjective       Patient's complete Health Risk Assessment and screening values have been reviewed and are found in Flowsheets. The following problems were reviewed today and where indicated follow up appointments were made and/or referrals ordered. Positive Risk Factor Screenings with Interventions:    Fall Risk:  Do you feel unsteady or are you worried about falling? : (!) yes (has had several episodes of dizziness upion standing)  2 or more falls in past year?: no  Fall with injury in past year?: no     Interventions:    Discussed w pt need to make sure has his equilibrium when first standing- pt will discuss further with MD at upcoming appointment              Weight and Activity:  Physical Activity: Insufficiently Active    Days of Exercise per Week: 2 days    Minutes of Exercise per Session: 10 min     On average, how many days per week do you engage in moderate to strenuous exercise (like a brisk walk)?: 2 days  Have you lost any weight without trying in the past 3 months?: No  There is no height or weight on file to calculate BMI.  (!) Abnormal  Obesity Interventions:  See AVS for additional education material           Hearing Screen:  Do you or your family notice any trouble with your hearing that hasn't been managed with hearing aids?: (!) Yes    Interventions:  Patient declines any further evaluation or treatment                         Objective      Patient-Reported Vitals  Patient-Reported Weight: 232lb  Patient-Reported Height: 5' 9\"     Patient did not monitor blood pressure at home

## 2023-08-17 NOTE — PATIENT INSTRUCTIONS
Personalized Preventive Plan for Zoey Barth - 8/17/2023  Medicare offers a range of preventive health benefits. Some of the tests and screenings are paid in full while other may be subject to a deductible, co-insurance, and/or copay. Some of these benefits include a comprehensive review of your medical history including lifestyle, illnesses that may run in your family, and various assessments and screenings as appropriate. After reviewing your medical record and screening and assessments performed today your provider may have ordered immunizations, labs, imaging, and/or referrals for you. A list of these orders (if applicable) as well as your Preventive Care list are included within your After Visit Summary for your review. Other Preventive Recommendations:    A preventive eye exam performed by an eye specialist is recommended every 1-2 years to screen for glaucoma; cataracts, macular degeneration, and other eye disorders. A preventive dental visit is recommended every 6 months. Try to get at least 150 minutes of exercise per week or 10,000 steps per day on a pedometer . Order or download the FREE \"Exercise & Physical Activity: Your Everyday Guide\" from [employer]. Call 1-164.411.7436 or search [employer] online. You need 2897-2294 mg of calcium and 5135-6244 IU of vitamin D per day. It is possible to meet your calcium requirement with diet alone, but a vitamin D supplement is usually necessary to meet this goal.  When exposed to the sun, use a sunscreen that protects against both UVA and UVB radiation with an SPF of 30 or greater. Reapply every 2 to 3 hours or after sweating, drying off with a towel, or swimming. Always wear a seat belt when traveling in a car. Always wear a helmet when riding a bicycle or motorcycle. Preventing Falls: Care Instructions    Talk to your doctor about the medicines you take.  Ask if any of them increase the risk of

## 2023-08-29 ENCOUNTER — OFFICE VISIT (OUTPATIENT)
Dept: FAMILY MEDICINE CLINIC | Age: 83
End: 2023-08-29

## 2023-08-29 VITALS
HEART RATE: 79 BPM | SYSTOLIC BLOOD PRESSURE: 134 MMHG | WEIGHT: 230 LBS | DIASTOLIC BLOOD PRESSURE: 72 MMHG | BODY MASS INDEX: 33.97 KG/M2 | OXYGEN SATURATION: 98 %

## 2023-08-29 DIAGNOSIS — I10 ESSENTIAL HYPERTENSION: ICD-10-CM

## 2023-08-29 DIAGNOSIS — E11.9 TYPE 2 DIABETES MELLITUS WITHOUT COMPLICATION, WITHOUT LONG-TERM CURRENT USE OF INSULIN (HCC): Primary | ICD-10-CM

## 2023-08-29 DIAGNOSIS — E78.2 MIXED HYPERLIPIDEMIA: ICD-10-CM

## 2023-08-29 LAB — HBA1C MFR BLD: 7.3 %

## 2023-08-29 RX ORDER — ATENOLOL AND CHLORTHALIDONE TABLET 100; 25 MG/1; MG/1
1 TABLET ORAL DAILY
Qty: 90 TABLET | Refills: 1 | Status: SHIPPED | OUTPATIENT
Start: 2023-08-29

## 2023-08-29 RX ORDER — ATORVASTATIN CALCIUM 40 MG/1
TABLET, FILM COATED ORAL
Qty: 90 TABLET | Refills: 1 | Status: SHIPPED | OUTPATIENT
Start: 2023-08-29

## 2023-09-28 ENCOUNTER — OFFICE VISIT (OUTPATIENT)
Dept: CARDIOLOGY CLINIC | Age: 83
End: 2023-09-28
Payer: MEDICARE

## 2023-09-28 ENCOUNTER — TELEPHONE (OUTPATIENT)
Dept: CARDIOLOGY CLINIC | Age: 83
End: 2023-09-28

## 2023-09-28 VITALS
WEIGHT: 232 LBS | DIASTOLIC BLOOD PRESSURE: 48 MMHG | HEIGHT: 68 IN | SYSTOLIC BLOOD PRESSURE: 118 MMHG | OXYGEN SATURATION: 96 % | BODY MASS INDEX: 35.16 KG/M2 | HEART RATE: 43 BPM

## 2023-09-28 DIAGNOSIS — E78.2 MIXED HYPERLIPIDEMIA: ICD-10-CM

## 2023-09-28 DIAGNOSIS — I35.0 NONRHEUMATIC AORTIC VALVE STENOSIS: ICD-10-CM

## 2023-09-28 DIAGNOSIS — I48.91 ATRIAL FIBRILLATION, UNSPECIFIED TYPE (HCC): Primary | ICD-10-CM

## 2023-09-28 DIAGNOSIS — R55 PRE-SYNCOPE: ICD-10-CM

## 2023-09-28 DIAGNOSIS — I10 ESSENTIAL HYPERTENSION: ICD-10-CM

## 2023-09-28 PROCEDURE — 3074F SYST BP LT 130 MM HG: CPT | Performed by: INTERNAL MEDICINE

## 2023-09-28 PROCEDURE — 93000 ELECTROCARDIOGRAM COMPLETE: CPT | Performed by: INTERNAL MEDICINE

## 2023-09-28 PROCEDURE — 3078F DIAST BP <80 MM HG: CPT | Performed by: INTERNAL MEDICINE

## 2023-09-28 PROCEDURE — 99214 OFFICE O/P EST MOD 30 MIN: CPT | Performed by: INTERNAL MEDICINE

## 2023-09-28 PROCEDURE — 1123F ACP DISCUSS/DSCN MKR DOCD: CPT | Performed by: INTERNAL MEDICINE

## 2023-09-28 NOTE — PROGRESS NOTES
[address] [facility]   Cardiac Follow Up    Referring Provider:  Atiya Smith DO   Originally saw 1/20  C/O baseline TELLEZ today     Subjective: Cyrus Chairez presents for cardiology follow up AF, HTN, HLD. History of Present Illness:    Cyrus Chairez is a 81 y.o. male who has PMH chronic afib dx 2/21 on eliquis, arthritis, s/p left THR 12/21, gout, DM, HTN, HLD, moderate AS, s/p L THR 12/21, chronic LE edema, and prostate cancer s/p radiation seeds. ECHO 1/10/2020 EF=55-60%, mild to mod AS (velocity=2.99m/s; MPG=19mmHg). Rocio nuc 8/15 normal.  SUKHDEV 2/2021 100% AF and 4% PVC burden. ECHO 3/21 moderate AS (velocity=3.83m/s; MPG=36mmHg). Due to worsened AS with quick progression from 1/20 study I referred to Dr. Nidia Wesley TAVR clinic. He felt AS moderate and did not require TAVR. Echo 9/7/2021 AV max velocity of 3.49 m/sec and MPG of 29 mmHg c/w moderate AS. Most recent Echo 4/12/22 normal EF of 55% and moderate AS (velocity is 3.33m/s and MPG 25mmHg). No change in AS over last couple of studies. Today, patient has no complaints from a cardiovascular standpoint. Since last OV patient reports 3 episodes recently of his \"legs giving out. \" One time he was standing to get up put of chair and the other 2  times he was in a standing position and his wife caught him. Reports some mild dizziness also. Patient denies current edema, chest pain, sob, palpitations, dizziness or syncope. Most recent EKG today 9/28/23 afib; IVCD; PVC's; NST change. Originally from [place] and is a retired . Weight today is 232# ( down 2 # from 10/2022)     Past Medical History:   has a past medical history of Arthritis, Chronic gout without tophus, Diabetes mellitus ([address]), ED (erectile dysfunction), Erectile dysfunction, Hyperlipidemia, Hypertension, Nocturia, Pre-diabetes, and Prostate CA ([address]). Surgical History:   has a past surgical history that includes Prostate surgery;  Tonsillectomy; Knee arthroscopy; knee surgery

## 2023-09-28 NOTE — PATIENT INSTRUCTIONS
Plan:  Current medications reviewed. No changes at this time. Refills given as warranted. Labs reviewed. Annual lab work due   Limited Echo due to assess moderate Aortic Stenosis. Call [phone] to schedule this test  Orthostatic BP today  2 week cardiac monitor for pre syncope. Reduce your salt intake as this is will help reduce swelling in legs. He has LE edema likely venous insufficiency but does not bother him and does not want diuretic.   Follow up with me in 4 months

## 2023-09-28 NOTE — TELEPHONE ENCOUNTER
Monitor placed by [facility]  Monitor company [employer]  Length of monitor 14 days  Monitor ordered by smm  Activation successful prior to pt leaving office?  Yes

## 2023-10-02 ENCOUNTER — TELEPHONE (OUTPATIENT)
Dept: CARDIOLOGY CLINIC | Age: 83
End: 2023-10-02

## 2023-10-02 ENCOUNTER — HOSPITAL ENCOUNTER (OUTPATIENT)
Age: 83
Discharge: HOME | End: 2023-10-02
Payer: MEDICARE

## 2023-10-02 DIAGNOSIS — I35.0 NONRHEUMATIC AORTIC VALVE STENOSIS: ICD-10-CM

## 2023-10-02 DIAGNOSIS — I10 ESSENTIAL HYPERTENSION: ICD-10-CM

## 2023-10-02 DIAGNOSIS — R55 PRE-SYNCOPE: ICD-10-CM

## 2023-10-02 DIAGNOSIS — E78.2 MIXED HYPERLIPIDEMIA: ICD-10-CM

## 2023-10-02 LAB
BASOPHILS # BLD: 0.1 K/UL (ref 0–0.2)
BASOPHILS NFR BLD: 0.9 %
DEPRECATED RDW RBC AUTO: 16.6 % (ref 12.4–15.4)
EOSINOPHIL # BLD: 0.3 K/UL (ref 0–0.6)
EOSINOPHIL NFR BLD: 5.4 %
HCT VFR BLD AUTO: 38.1 % (ref 40.5–52.5)
HGB BLD-MCNC: 12.6 G/DL (ref 13.5–17.5)
LYMPHOCYTES # BLD: 1.3 K/UL (ref 1–5.1)
LYMPHOCYTES NFR BLD: 21.1 %
MCH RBC QN AUTO: 27.3 PG (ref 26–34)
MCHC RBC AUTO-ENTMCNC: 33.1 G/DL (ref 31–36)
MCV RBC AUTO: 82.6 FL (ref 80–100)
MONOCYTES # BLD: 0.4 K/UL (ref 0–1.3)
MONOCYTES NFR BLD: 6.8 %
NEUTROPHILS # BLD: 4.2 K/UL (ref 1.7–7.7)
NEUTROPHILS NFR BLD: 65.8 %
PLATELET # BLD AUTO: 210 K/UL (ref 135–450)
PMV BLD AUTO: 9.2 FL (ref 5–10.5)
RBC # BLD AUTO: 4.61 M/UL (ref 4.2–5.9)
T4 FREE SERPL-MCNC: ABNORMAL NG/DL (ref 0.9–1.8)
TSH SERPL DL<=0.005 MIU/L-ACNC: ABNORMAL UIU/ML (ref 0.27–4.2)
WBC # BLD AUTO: 6.3 K/UL (ref 4–11)

## 2023-10-02 PROCEDURE — 84443 ASSAY THYROID STIM HORMONE: CPT

## 2023-10-02 PROCEDURE — 36415 COLL VENOUS BLD VENIPUNCTURE: CPT

## 2023-10-02 PROCEDURE — 84439 ASSAY OF FREE THYROXINE: CPT

## 2023-10-02 PROCEDURE — 85025 COMPLETE CBC W/AUTO DIFF WBC: CPT

## 2023-10-02 NOTE — TELEPHONE ENCOUNTER
Created telephone encounter. Spoke with Anup relayed message per [facility] regarding labs. Pt verbalized understanding.

## 2023-10-03 ENCOUNTER — TELEPHONE (OUTPATIENT)
Dept: CARDIOLOGY CLINIC | Age: 83
End: 2023-10-03

## 2023-10-03 NOTE — TELEPHONE ENCOUNTER
----- Message from Jin Jerome MD sent at 10/2/2023  5:38 PM EDT -----  Not sure what updated T4 and TSH test results are about? Can you find out and let me know? Thanks. One thyroid test mildly out of range of normal and the second test is within normal limits. I would have him call PCP to review thyroid test results to see if anything needs t be done or just watch with future blood tests. Let us know what PCP says. Thanks.

## 2023-10-05 NOTE — TELEPHONE ENCOUNTER
Attempted to call patient, number does not go through. No hippa form on file to try other number on file. 3rd attempt, mailing letter and closing encounter.

## 2023-11-02 ENCOUNTER — TELEPHONE (OUTPATIENT)
Dept: ORTHOPEDIC SURGERY | Age: 83
End: 2023-11-02

## 2023-11-02 NOTE — TELEPHONE ENCOUNTER
General Question     Subject: LETTER FOR DENTIST  Patient and /or Facility Request: Traci Sanchez  Contact Number: [phone]    PT CALLED STATING HE IS A PATIENT OF DR. SIEGEL, DR Jamil Wolfe PERFORMED HIS HIP REPLACEMENT AND DR Cindi Genao DID HIS KNEE REPLACEMENT. PT STATED HE JUST GOT A NEW DENTIST, THEY NEED A LETTER FROM DR Jamil Wolfe STATING HE WILL NEED ANTIBIOTICS BEFORE HIS DENTAL APPOINTMENTS. PATIENT WOULD LIKE THIS FAXED TO HIS DENTIST, THEIR FAX NUMBER [phone]. PLEASE CALL PT BACK AT THE ABOVE NUMBER.

## 2023-11-03 DIAGNOSIS — R55 PRE-SYNCOPE: ICD-10-CM

## 2023-11-06 ENCOUNTER — TELEPHONE (OUTPATIENT)
Dept: CARDIOLOGY CLINIC | Age: 83
End: 2023-11-06

## 2023-11-06 DIAGNOSIS — I10 ESSENTIAL HYPERTENSION: ICD-10-CM

## 2023-11-06 RX ORDER — LISINOPRIL 20 MG/1
TABLET ORAL
Qty: 45 TABLET | Refills: 3 | Status: SHIPPED | OUTPATIENT
Start: 2023-11-06

## 2023-11-06 NOTE — TELEPHONE ENCOUNTER
----- Message from Mar Reed MD sent at 11/3/2023  6:17 PM EDT -----  Monitor shows slow HR with chronic afib. Per EP doc he needs to STOP atenolol. Please make EP doc appt asap to evaluate and help manage. Thanks.

## 2023-11-06 NOTE — TELEPHONE ENCOUNTER
Spoke to pt, relayed SMM message. Pt v/u. Per SMM pt needs appt with EP. Front, can you please schedule pt appt?

## 2023-11-06 NOTE — TELEPHONE ENCOUNTER
.Refill Request     CONFIRM preferred pharmacy with the patient. If Mail Order Rx - Pend for 90 day refill. Last Seen: Last Seen Department: 8/29/2023  Last Seen by PCP: 8/29/2023    Last Written: 5-4-23 45 with 1    If no future appointment scheduled:  Review the last OV with PCP and review information for follow-up visit,  Route STAFF MESSAGE with patient name to the [facility] for scheduling with the following information:            -  Timing of next visit           -  Visit type ie Physical, OV, etc           -  Diagnoses/Reason ie. COPD, HTN - Do not use MEDICATION, Follow-up or CHECK UP - Give reason for visit      Next Appointment:   Future Appointments   Date Time Provider [address]   3/5/2024  9:30 AM Camilla Rios DO [facility]  [place] [facility]       Message sent to [email or web address] to schedule appt with patient?   N/A      Requested Prescriptions     Pending Prescriptions Disp Refills    lisinopril (PRINIVIL;ZESTRIL) 20 MG tablet [Pharmacy Med Name: Lisinopril Oral Tablet 20 MG] 45 tablet 3     Sig: TAKE 1/2 TABLET BY MOUTH EVERY DAY

## 2023-11-13 ENCOUNTER — TELEPHONE (OUTPATIENT)
Dept: FAMILY MEDICINE CLINIC | Age: 83
End: 2023-11-13

## 2023-11-13 ENCOUNTER — TELEPHONE (OUTPATIENT)
Dept: ORTHOPEDIC SURGERY | Age: 83
End: 2023-11-13

## 2023-11-13 NOTE — TELEPHONE ENCOUNTER
----- Message from Jose F Coleman sent at 11/13/2023  1:23 PM EST -----  Subject: Medication Problem    Medication: metFORMIN (GLUCOPHAGE) 500 MG tablet  Dosage: 1 per day  Ordering Provider: dianne    Question/Problem: Pt saw heart  a week ago (on 9/28) . Heart    recommended that he Increase his Metforman from 1 pill to 2 pills a day. Please contact Pt to discuss and if prescription is filled.  Thank you      Pharmacy: [facility] [address], [address] [place]    ---------------------------------------------------------------------------  --------------  Luna Bailon INFO  [phone]; OK to leave message on voicemail  ---------------------------------------------------------------------------  --------------    SCRIPT ANSWERS  Relationship to Patient: Self

## 2023-11-14 NOTE — TELEPHONE ENCOUNTER
Called patient and informed him. He states that the prescription that he was given from the pharmacy was for 90 days taking one a day. He states that he has been taking the two a day, but is running out. I see that a prescription was written on 8/29/2023 to take BID. I am checking with the pharmacy to see if they received that prescription. Pharmacy states that they did not receive the prescription. Will need to be resent again.

## 2023-11-14 NOTE — TELEPHONE ENCOUNTER
Pharmacy called asking for this to be refilled they are not sure what happened to the refill that was sen in August please advise

## 2023-11-17 NOTE — PROGRESS NOTES
leadless pacemaker option would be very reasonable. Risks, benefits, alternatives and description of procedure discussed with patient in details. Patient is agreeable to proceed. Will plan procedure in next few weeks. Will keep off beta-blocker therapy at this time. No other medication changes made today. 2. Moderate aortic stenosis: assessed for possible TAVR. Continue medical management and follow up with primary cardiologist.     3. Hypertension: Good overall blood pressure control. Plan:     Discussed the risks and benefits of a leadless pacemaker (literature provided). - Hold metformin and multivitamin the morning of the procedure. Continue taking Eliquis as prescribed. Follow up with me after the procedure. Subjective:       Patient ID: Dave Fontenot is a 81 y.o. male. Chief Complaint:  Chief Complaint   Patient presents with    Follow-up    Atrial Fibrillation    Shortness of Breath    Fatigue     If he gets up to quick       HPI    Patient is a pleasant 81 y.o. male who presents for evaluation of atrial fibrillation. The patient has a past medical history of diabetes, hyperlipidemia, hypertension, prostate cancer, and atrial fibrillation. Stress test and echocardiogram in 2020 were normal. Cardiac event monitor in 02/2021 showed 100% AF burden with 4% PVC burden. Echocardiogram 03/21 moderate AS. Due to worsened AS with quick progression from 01/20 study he was referred to Dr. Julia Lopez TAVR clinic. He felt AS moderate and did not require TAVR. Patient wore a cardiac event monitor from 09/28/2023 to 10/12/2023 which demonstrated predominately AF with an average HR of 46 (). PVC burden 3.60%. Office Visit ([address], 11/20/2023):  He presents today for evaluation of atrial fibrillation. He reports that he has been feeling well overall. He does have dizziness after bending over. Earlier this year he notes three episodes of weakness.  He stood up to walk and his legs suddenly

## 2023-11-20 ENCOUNTER — OFFICE VISIT (OUTPATIENT)
Dept: CARDIOLOGY CLINIC | Age: 83
End: 2023-11-20
Payer: MEDICARE

## 2023-11-20 VITALS
HEIGHT: 68 IN | WEIGHT: 237 LBS | SYSTOLIC BLOOD PRESSURE: 134 MMHG | DIASTOLIC BLOOD PRESSURE: 72 MMHG | HEART RATE: 53 BPM | OXYGEN SATURATION: 97 % | BODY MASS INDEX: 35.92 KG/M2

## 2023-11-20 DIAGNOSIS — R55 NEAR SYNCOPE: ICD-10-CM

## 2023-11-20 DIAGNOSIS — I35.0 MODERATE AORTIC STENOSIS: ICD-10-CM

## 2023-11-20 DIAGNOSIS — I48.91 ATRIAL FIBRILLATION, UNSPECIFIED TYPE (HCC): ICD-10-CM

## 2023-11-20 DIAGNOSIS — E66.01 SEVERE OBESITY (BMI 35.0-39.9) WITH COMORBIDITY (HCC): ICD-10-CM

## 2023-11-20 DIAGNOSIS — R00.1 BRADYCARDIA: ICD-10-CM

## 2023-11-20 DIAGNOSIS — I48.21 PERMANENT ATRIAL FIBRILLATION (HCC): Primary | ICD-10-CM

## 2023-11-20 DIAGNOSIS — I44.30 AV BLOCK: ICD-10-CM

## 2023-11-20 DIAGNOSIS — I10 ESSENTIAL HYPERTENSION: ICD-10-CM

## 2023-11-20 PROCEDURE — 3078F DIAST BP <80 MM HG: CPT | Performed by: INTERNAL MEDICINE

## 2023-11-20 PROCEDURE — 93000 ELECTROCARDIOGRAM COMPLETE: CPT | Performed by: INTERNAL MEDICINE

## 2023-11-20 PROCEDURE — 99205 OFFICE O/P NEW HI 60 MIN: CPT | Performed by: INTERNAL MEDICINE

## 2023-11-20 PROCEDURE — 3075F SYST BP GE 130 - 139MM HG: CPT | Performed by: INTERNAL MEDICINE

## 2023-11-20 ASSESSMENT — ENCOUNTER SYMPTOMS
HEMATEMESIS: 0
RIGHT EYE: 0
STRIDOR: 0
LEFT EYE: 0
SHORTNESS OF BREATH: 0
HEMATOCHEZIA: 0
WHEEZING: 0

## 2023-11-20 NOTE — PATIENT INSTRUCTIONS
Plan:     Discussed the risks and benefits of a leadless pacemaker (literature provided). - Hold metformin and multivitamin the morning of the procedure. Continue taking Eliquis as prescribed. Follow up with me after the procedure.

## 2023-12-04 ENCOUNTER — TELEPHONE (OUTPATIENT)
Dept: ORTHOPEDIC SURGERY | Age: 83
End: 2023-12-04

## 2023-12-04 DIAGNOSIS — Z96.642 HISTORY OF TOTAL HIP ARTHROPLASTY, LEFT: Primary | ICD-10-CM

## 2023-12-04 RX ORDER — CLINDAMYCIN HYDROCHLORIDE 300 MG/1
CAPSULE ORAL
Qty: 6 CAPSULE | Refills: 0 | Status: SHIPPED | OUTPATIENT
Start: 2023-12-04

## 2023-12-04 NOTE — TELEPHONE ENCOUNTER
Prescription Refill     Medication Name:  ANTIBIOTIC  Pharmacy: [facility]  Patient Contact Number:  [phone]    DENTAL CLEANING 12/8

## 2023-12-05 RX ORDER — CLINDAMYCIN HYDROCHLORIDE 300 MG/1
CAPSULE ORAL
Qty: 6 CAPSULE | Refills: 2 | Status: SHIPPED | OUTPATIENT
Start: 2023-12-05

## 2023-12-05 NOTE — TELEPHONE ENCOUNTER
Prescription Refill     Medication Name:  ANTIBIOTIC  Pharmacy: [facility]  Patient Contact Number:  [phone]    DENTAL CLEANING 12/8

## 2023-12-19 ENCOUNTER — HOSPITAL ENCOUNTER (INPATIENT)
Dept: CARDIAC CATH/INVASIVE PROCEDURES | Age: 83
LOS: 1 days | Discharge: HOME | DRG: 229 | End: 2023-12-20
Attending: INTERNAL MEDICINE | Admitting: INTERNAL MEDICINE
Payer: MEDICARE

## 2023-12-19 PROBLEM — Z95.0 PACEMAKER: Status: ACTIVE | Noted: 2023-12-19

## 2023-12-19 PROBLEM — I44.30 AV BLOCK: Status: ACTIVE | Noted: 2023-12-19

## 2023-12-19 LAB
ANION GAP SERPL CALCULATED.3IONS-SCNC: 9 MMOL/L (ref 3–16)
BUN SERPL-MCNC: 17 MG/DL (ref 7–20)
CALCIUM SERPL-MCNC: 9.7 MG/DL (ref 8.3–10.6)
CHLORIDE SERPL-SCNC: 100 MMOL/L (ref 99–110)
CO2 SERPL-SCNC: 26 MMOL/L (ref 21–32)
CREAT SERPL-MCNC: 0.6 MG/DL (ref 0.8–1.3)
DEPRECATED RDW RBC AUTO: 16.6 % (ref 12.4–15.4)
EKG ATRIAL RATE: 258 BPM
EKG ATRIAL RATE: 60 BPM
EKG ATRIAL RATE: 60 BPM
EKG DIAGNOSIS: NORMAL
EKG P AXIS: -6 DEGREES
EKG P-R INTERVAL: 368 MS
EKG Q-T INTERVAL: 466 MS
EKG Q-T INTERVAL: 520 MS
EKG Q-T INTERVAL: 532 MS
EKG QRS DURATION: 182 MS
EKG QRS DURATION: 188 MS
EKG QRS DURATION: 98 MS
EKG QTC CALCULATION (BAZETT): 445 MS
EKG QTC CALCULATION (BAZETT): 520 MS
EKG QTC CALCULATION (BAZETT): 532 MS
EKG R AXIS: -67 DEGREES
EKG R AXIS: 101 DEGREES
EKG R AXIS: 117 DEGREES
EKG T AXIS: -51 DEGREES
EKG T AXIS: -61 DEGREES
EKG T AXIS: 55 DEGREES
EKG VENTRICULAR RATE: 55 BPM
EKG VENTRICULAR RATE: 60 BPM
EKG VENTRICULAR RATE: 60 BPM
GFR SERPLBLD CREATININE-BSD FMLA CKD-EPI: >60 ML/MIN/{1.73_M2}
GLUCOSE SERPL-MCNC: 115 MG/DL (ref 70–99)
HCT VFR BLD AUTO: 39.5 % (ref 40.5–52.5)
HGB BLD-MCNC: 13 G/DL (ref 13.5–17.5)
MCH RBC QN AUTO: 27.1 PG (ref 26–34)
MCHC RBC AUTO-ENTMCNC: 32.9 G/DL (ref 31–36)
MCV RBC AUTO: 82.4 FL (ref 80–100)
PLATELET # BLD AUTO: 243 K/UL (ref 135–450)
PMV BLD AUTO: 7.3 FL (ref 5–10.5)
POTASSIUM SERPL-SCNC: 4 MMOL/L (ref 3.5–5.1)
RBC # BLD AUTO: 4.79 M/UL (ref 4.2–5.9)
SODIUM SERPL-SCNC: 135 MMOL/L (ref 136–145)
WBC # BLD AUTO: 5.8 K/UL (ref 4–11)

## 2023-12-19 PROCEDURE — C1786 PMKR, SINGLE, RATE-RESP: HCPCS | Performed by: INTERNAL MEDICINE

## 2023-12-19 PROCEDURE — 80048 BASIC METABOLIC PNL TOTAL CA: CPT

## 2023-12-19 PROCEDURE — 6360000002 HC RX W HCPCS

## 2023-12-19 PROCEDURE — 2060000000 HC ICU INTERMEDIATE R&B

## 2023-12-19 PROCEDURE — 85027 COMPLETE CBC AUTOMATED: CPT

## 2023-12-19 PROCEDURE — 2709999900 HC NON-CHARGEABLE SUPPLY: Performed by: INTERNAL MEDICINE

## 2023-12-19 PROCEDURE — C1769 GUIDE WIRE: HCPCS | Performed by: INTERNAL MEDICINE

## 2023-12-19 PROCEDURE — 2580000003 HC RX 258: Performed by: INTERNAL MEDICINE

## 2023-12-19 PROCEDURE — 33274 TCAT INSJ/RPL PERM LDLS PM: CPT | Performed by: INTERNAL MEDICINE

## 2023-12-19 PROCEDURE — 3700000000 HC ANESTHESIA ATTENDED CARE: Performed by: ANESTHESIOLOGY

## 2023-12-19 PROCEDURE — 3700000001 HC ADD 15 MINUTES (ANESTHESIA): Performed by: ANESTHESIOLOGY

## 2023-12-19 PROCEDURE — 2580000003 HC RX 258

## 2023-12-19 PROCEDURE — 6360000002 HC RX W HCPCS: Performed by: INTERNAL MEDICINE

## 2023-12-19 PROCEDURE — G0378 HOSPITAL OBSERVATION PER HR: HCPCS

## 2023-12-19 PROCEDURE — 33216 INSERT 1 ELECTRODE PM-DEFIB: CPT

## 2023-12-19 PROCEDURE — 93010 ELECTROCARDIOGRAM REPORT: CPT | Performed by: INTERNAL MEDICINE

## 2023-12-19 PROCEDURE — 6370000000 HC RX 637 (ALT 250 FOR IP): Performed by: INTERNAL MEDICINE

## 2023-12-19 PROCEDURE — C1894 INTRO/SHEATH, NON-LASER: HCPCS | Performed by: INTERNAL MEDICINE

## 2023-12-19 PROCEDURE — 2500000003 HC RX 250 WO HCPCS

## 2023-12-19 PROCEDURE — 33274 TCAT INSJ/RPL PERM LDLS PM: CPT

## 2023-12-19 PROCEDURE — 93005 ELECTROCARDIOGRAM TRACING: CPT | Performed by: INTERNAL MEDICINE

## 2023-12-19 PROCEDURE — 02HK3NZ INSERTION OF INTRACARDIAC PACEMAKER INTO RIGHT VENTRICLE, PERCUTANEOUS APPROACH: ICD-10-PCS | Performed by: INTERNAL MEDICINE

## 2023-12-19 RX ORDER — SODIUM CHLORIDE 9 MG/ML
INJECTION, SOLUTION INTRAVENOUS PRN
OUTPATIENT
Start: 2023-12-19

## 2023-12-19 RX ORDER — OXYCODONE HYDROCHLORIDE 5 MG/1
10 TABLET ORAL PRN
OUTPATIENT
Start: 2023-12-19 | End: 2023-12-19

## 2023-12-19 RX ORDER — SODIUM CHLORIDE 0.9 % (FLUSH) 0.9 %
5-40 SYRINGE (ML) INJECTION EVERY 12 HOURS SCHEDULED
Status: DISCONTINUED | OUTPATIENT
Start: 2023-12-19 | End: 2023-12-20 | Stop reason: HOSPADM

## 2023-12-19 RX ORDER — MEPERIDINE HYDROCHLORIDE 50 MG/ML
12.5 INJECTION INTRAMUSCULAR; INTRAVENOUS; SUBCUTANEOUS EVERY 5 MIN PRN
OUTPATIENT
Start: 2023-12-19

## 2023-12-19 RX ORDER — HYDROMORPHONE HYDROCHLORIDE 1 MG/ML
0.25 INJECTION, SOLUTION INTRAMUSCULAR; INTRAVENOUS; SUBCUTANEOUS EVERY 5 MIN PRN
OUTPATIENT
Start: 2023-12-19

## 2023-12-19 RX ORDER — SODIUM CHLORIDE 0.9 % (FLUSH) 0.9 %
5-40 SYRINGE (ML) INJECTION PRN
OUTPATIENT
Start: 2023-12-19

## 2023-12-19 RX ORDER — SODIUM CHLORIDE 9 MG/ML
INJECTION, SOLUTION INTRAVENOUS PRN
Status: DISCONTINUED | OUTPATIENT
Start: 2023-12-19 | End: 2023-12-19 | Stop reason: SDUPTHER

## 2023-12-19 RX ORDER — OXYCODONE HYDROCHLORIDE 5 MG/1
5 TABLET ORAL PRN
OUTPATIENT
Start: 2023-12-19 | End: 2023-12-19

## 2023-12-19 RX ORDER — SODIUM CHLORIDE 9 MG/ML
INJECTION, SOLUTION INTRAVENOUS PRN
Status: DISCONTINUED | OUTPATIENT
Start: 2023-12-19 | End: 2023-12-20 | Stop reason: HOSPADM

## 2023-12-19 RX ORDER — SODIUM CHLORIDE 0.9 % (FLUSH) 0.9 %
5-40 SYRINGE (ML) INJECTION EVERY 12 HOURS SCHEDULED
OUTPATIENT
Start: 2023-12-19

## 2023-12-19 RX ORDER — ACETAMINOPHEN 325 MG/1
650 TABLET ORAL EVERY 4 HOURS PRN
Status: DISCONTINUED | OUTPATIENT
Start: 2023-12-19 | End: 2023-12-20 | Stop reason: HOSPADM

## 2023-12-19 RX ORDER — LABETALOL HYDROCHLORIDE 5 MG/ML
10 INJECTION, SOLUTION INTRAVENOUS
OUTPATIENT
Start: 2023-12-19

## 2023-12-19 RX ORDER — HYDROMORPHONE HYDROCHLORIDE 1 MG/ML
0.5 INJECTION, SOLUTION INTRAMUSCULAR; INTRAVENOUS; SUBCUTANEOUS EVERY 5 MIN PRN
OUTPATIENT
Start: 2023-12-19

## 2023-12-19 RX ORDER — CARVEDILOL 6.25 MG/1
6.25 TABLET ORAL 2 TIMES DAILY WITH MEALS
Status: DISCONTINUED | OUTPATIENT
Start: 2023-12-19 | End: 2023-12-20 | Stop reason: HOSPADM

## 2023-12-19 RX ORDER — ONDANSETRON 2 MG/ML
4 INJECTION INTRAMUSCULAR; INTRAVENOUS
OUTPATIENT
Start: 2023-12-19 | End: 2023-12-20

## 2023-12-19 RX ORDER — SODIUM CHLORIDE 0.9 % (FLUSH) 0.9 %
5-40 SYRINGE (ML) INJECTION PRN
Status: DISCONTINUED | OUTPATIENT
Start: 2023-12-19 | End: 2023-12-20 | Stop reason: HOSPADM

## 2023-12-19 RX ORDER — SODIUM CHLORIDE 0.9 % (FLUSH) 0.9 %
5-40 SYRINGE (ML) INJECTION PRN
Status: DISCONTINUED | OUTPATIENT
Start: 2023-12-19 | End: 2023-12-19 | Stop reason: SDUPTHER

## 2023-12-19 RX ORDER — DIPHENHYDRAMINE HYDROCHLORIDE 50 MG/ML
12.5 INJECTION INTRAMUSCULAR; INTRAVENOUS
OUTPATIENT
Start: 2023-12-19 | End: 2023-12-20

## 2023-12-19 RX ORDER — SODIUM CHLORIDE 0.9 % (FLUSH) 0.9 %
5-40 SYRINGE (ML) INJECTION EVERY 12 HOURS SCHEDULED
Status: DISCONTINUED | OUTPATIENT
Start: 2023-12-19 | End: 2023-12-19 | Stop reason: SDUPTHER

## 2023-12-19 RX ADMIN — VANCOMYCIN HYDROCHLORIDE 1600 MG: 1 INJECTION, POWDER, LYOPHILIZED, FOR SOLUTION INTRAVENOUS at 11:11

## 2023-12-19 RX ADMIN — Medication 10 ML: at 10:06

## 2023-12-19 RX ADMIN — ACETAMINOPHEN 650 MG: 325 TABLET ORAL at 15:51

## 2023-12-19 RX ADMIN — CARVEDILOL 6.25 MG: 6.25 TABLET, FILM COATED ORAL at 15:51

## 2023-12-19 ASSESSMENT — PAIN DESCRIPTION - DESCRIPTORS
DESCRIPTORS: SORE
DESCRIPTORS: SORE

## 2023-12-19 ASSESSMENT — PAIN SCALES - GENERAL
PAINLEVEL_OUTOF10: 5
PAINLEVEL_OUTOF10: 5
PAINLEVEL_OUTOF10: 2
PAINLEVEL_OUTOF10: 6
PAINLEVEL_OUTOF10: 0
PAINLEVEL_OUTOF10: 5
PAINLEVEL_OUTOF10: 6
PAINLEVEL_OUTOF10: 5

## 2023-12-19 ASSESSMENT — PAIN DESCRIPTION - ORIENTATION
ORIENTATION: RIGHT
ORIENTATION: RIGHT

## 2023-12-19 ASSESSMENT — PAIN DESCRIPTION - LOCATION
LOCATION: GROIN
LOCATION: GROIN

## 2023-12-19 NOTE — PROGRESS NOTES
Dr. Bryon Bain at bedside to assess right femoral site. Non-concerning. Coreg for better BP control.

## 2023-12-19 NOTE — PROGRESS NOTES
Patient arrived to room 203 via stretcher from cath lab. Groin site C/D/I, soft. Femoral pulse +2. Complaints of 6/10 pain in right groin. VSS, afebrile. Family at bedside. Patient oriented to room, call light, fall precautions and bedrest protocol until 1900 this evening. All questions answered.     José Manuel Gerardo RN

## 2023-12-19 NOTE — PROGRESS NOTES
Report given to HALLE CARDOZA [facility] on A2. [facility] notified of transfer to room 203. All belongings gathered. R groin site remained unremarkable/soft. Bilateral pedal pulses remained +1/+2. Patient on bedrest for 6 hrs (until 1900).

## 2023-12-19 NOTE — H&P
I have reviewed seen, interviewed and examined the patient. There has been no change in the findings since our last office visit dated 11/20/2023.

## 2023-12-19 NOTE — PROGRESS NOTES
4 Eyes Skin Assessment     NAME:  Moni Francis  YOB: 1940  MEDICAL RECORD NUMBER:  [id number]    The patient is being assessed for  Cath Lab Post-Op    I agree that at least one RN has performed a thorough Head to Toe Skin Assessment on the patient. ALL assessment sites listed below have been assessed. Areas assessed by both nurses:    Head, Face, Ears, Shoulders, Back, Chest, Arms, Elbows, Hands, Sacrum. Buttock, Coccyx, Ischium, Legs. Feet and Heels, and Under Medical Devices         Does the Patient have a Wound? Yes wound(s) were present on assessment.  LDA wound assessment was Initiated and completed by RN       Santiago Prevention initiated by RN: No  Wound Care Orders initiated by RN: No    Pressure Injury (Stage 3,4, Unstageable, DTI, NWPT, and Complex wounds) if present, place Wound referral order by RN under : No    New Ostomies, if present place, Ostomy referral order under : No     Nurse 1 eSignature: Electronically signed by Loulou Bhatti RN on 12/19/23 at 3:57 PM EST    **SHARE this note so that the co-signing nurse can place an eSignature**    Nurse 2 eSignature: {Esignature:[id number]}

## 2023-12-19 NOTE — PROCEDURES
PROCEDURE PERFORMED:     1. Implantation of a single-chamber leadless pacemaker (PPM)    : Melina Blake M.D. COMPLICATIONS: None. ESTIMATED BLOOD LOSS: less than 10 cc. ANESTHESIA: MAC    OTHER MEDICATIONS: vancomycin IV preoperatively. HISTORY: This is an 81 y.o. male with long-standing atrial fibrillation and symptomatic bradycardia (due to AV block). DETAILS OF PROCEDURE: The patient was brought to the electrophysiology laboratory in stable condition. The patient was in a fasting, nonsedated state. The risks, benefits and alternatives of the procedure were discussed with the patient. The risks including, but not limited to, the risks of infection, bleeding, vascular injury, injury to cardiac or surrounding structures, device malfunction or dislodgement, radiation exposure, injury to cardiac structures, stroke, myocardial infarction and death were all discussed. The patient considered his treatment options and decided to proceed with the PPM implantation. The patient was prepped and draped in a sterile fashion. Lidocaine was administered to the right groin area. Vascular access into the right femoral vein was obtained under ultrasound guidence and using the modified Seldinger technique. After central venous access was obtained, an 8F sheath was placed in the right femoral vein. The access site was pre-closed with one figure-of-8 suture. A cone dilator used to enlarge the access point. The 27F Micra introducer sheath was prepared as per  instructions. The sheath was introduced with a dilator over an 0.035\" stiff J-tipped access wire. After the tip of the sheath was advanced into the middle of the right atrium, the dilator and wire were withdrawn under fluoroscopic guidance. The Micra delivery system was then prepared again per  instructions.  The delivery system was inserted through the delivery sheath under fluoroscopic guidance until it reached the

## 2023-12-20 VITALS
DIASTOLIC BLOOD PRESSURE: 86 MMHG | HEIGHT: 69 IN | RESPIRATION RATE: 20 BRPM | TEMPERATURE: 97.2 F | SYSTOLIC BLOOD PRESSURE: 164 MMHG | HEART RATE: 60 BPM | OXYGEN SATURATION: 93 % | BODY MASS INDEX: 34.66 KG/M2 | WEIGHT: 234 LBS

## 2023-12-20 LAB
ANION GAP SERPL CALCULATED.3IONS-SCNC: 9 MMOL/L (ref 3–16)
BUN SERPL-MCNC: 15 MG/DL (ref 7–20)
CALCIUM SERPL-MCNC: 9.1 MG/DL (ref 8.3–10.6)
CHLORIDE SERPL-SCNC: 102 MMOL/L (ref 99–110)
CO2 SERPL-SCNC: 24 MMOL/L (ref 21–32)
CREAT SERPL-MCNC: <0.5 MG/DL (ref 0.8–1.3)
DEPRECATED RDW RBC AUTO: 16.4 % (ref 12.4–15.4)
GFR SERPLBLD CREATININE-BSD FMLA CKD-EPI: >60 ML/MIN/{1.73_M2}
GLUCOSE SERPL-MCNC: 92 MG/DL (ref 70–99)
HCT VFR BLD AUTO: 36 % (ref 40.5–52.5)
HGB BLD-MCNC: 11.7 G/DL (ref 13.5–17.5)
MCH RBC QN AUTO: 27 PG (ref 26–34)
MCHC RBC AUTO-ENTMCNC: 32.6 G/DL (ref 31–36)
MCV RBC AUTO: 82.7 FL (ref 80–100)
PLATELET # BLD AUTO: 216 K/UL (ref 135–450)
PMV BLD AUTO: 8.3 FL (ref 5–10.5)
POTASSIUM SERPL-SCNC: 3.8 MMOL/L (ref 3.5–5.1)
RBC # BLD AUTO: 4.35 M/UL (ref 4.2–5.9)
SODIUM SERPL-SCNC: 135 MMOL/L (ref 136–145)
WBC # BLD AUTO: 7.9 K/UL (ref 4–11)

## 2023-12-20 PROCEDURE — 36415 COLL VENOUS BLD VENIPUNCTURE: CPT

## 2023-12-20 PROCEDURE — 85027 COMPLETE CBC AUTOMATED: CPT

## 2023-12-20 PROCEDURE — 80048 BASIC METABOLIC PNL TOTAL CA: CPT

## 2023-12-20 PROCEDURE — 6370000000 HC RX 637 (ALT 250 FOR IP): Performed by: INTERNAL MEDICINE

## 2023-12-20 PROCEDURE — 99239 HOSP IP/OBS DSCHRG MGMT >30: CPT

## 2023-12-20 PROCEDURE — G0378 HOSPITAL OBSERVATION PER HR: HCPCS

## 2023-12-20 RX ORDER — CARVEDILOL 6.25 MG/1
6.25 TABLET ORAL 2 TIMES DAILY WITH MEALS
Qty: 60 TABLET | Refills: 3 | Status: SHIPPED | OUTPATIENT
Start: 2023-12-20

## 2023-12-20 RX ADMIN — CARVEDILOL 6.25 MG: 6.25 TABLET, FILM COATED ORAL at 09:04

## 2023-12-20 ASSESSMENT — PAIN SCALES - GENERAL: PAINLEVEL_OUTOF10: 0

## 2023-12-20 NOTE — CARE COORDINATION
Discharge order noted. Spoke with RN who states patient is from home with his wife and is independent at home and has no needs from CM. Patient has a PCP and insurance.

## 2023-12-20 NOTE — PROGRESS NOTES
TANYA Noriega at bedside to remove cross stitch in right groin. Pt tolerated well. Pt to be on bed rest for an hour post stitch removal. Pt aware.

## 2023-12-20 NOTE — PROGRESS NOTES
PIV removed. Tip intact. Dressing in place. Tele box removed. [facility]U notified of pt discharge. Discharge paperwork went over with and given to pt. Verbalizes understanding. Pt lock box emptied. Pt wheeled via wheelchair to private car with all belongings. Prescriptions picked up at outpatient pharmacy. Pt discharge home in stable condition.

## 2023-12-20 NOTE — PLAN OF CARE
Problem: Safety - Adult  Goal: Free from fall injury  12/20/2023 1116 by Kike Soriano RN  Outcome: Progressing     Problem: Pain  Goal: Verbalizes/displays adequate comfort level or baseline comfort level  12/20/2023 1116 by Kike Soriano RN  Outcome: Progressing     Problem: Chronic Conditions and Co-morbidities  Goal: Patient's chronic conditions and co-morbidity symptoms are monitored and maintained or improved  Outcome: Progressing

## 2023-12-20 NOTE — PLAN OF CARE
Problem: Discharge Planning  Goal: Discharge to home or other facility with appropriate resources  12/20/2023 0539 by Kike Rodriguez RN  Outcome: Progressing     Problem: Pain  Goal: Verbalizes/displays adequate comfort level or baseline comfort level  Outcome: Progressing     Problem: Safety - Adult  Goal: Free from fall injury  Outcome: Progressing

## 2023-12-20 NOTE — DISCHARGE SUMMARY
Patient ID:  Anup Del Cid  [id number]  83 y.o.  1940    Admit date: 12/19/2023    Discharge date and time: 12/20/2023    Admitting Physician: Yeimi Alberts MD     Discharge NP: Leann Edouard CNP    Admission Diagnoses: Bradycardia, unspecified [R00.1]  AV block [I44.30]    Discharge Diagnoses: SAME    Admission Condition: fair    Discharged Condition: good    Hospital Course:  Anup Del Cid was admitted on 12/19/2023 and had leadless [employer] Micra implanted. Device check was normal and CXR was normal from a device standpoint. Rhythm has been VPaced. Denies chest pain, shortness of breath, and palpitations    Consults:    Assessment:   Persistent atrial fibrillation   -[id number]-HGHf score 4 (age, diabetes, hypertension)   -cardiac event monitor 09/28/2023 to 10/12/2023-predominately AF average HR of 46 (). PVC burden 3.60%.  Symptomatic AV block   -s/p leadless Micra implant on 12/19/2023   -device check  Hypertension  Moderate aortic stenosis  Hyperlipidemia  Diabetes      Plan:   Continue Eliquis 5 mg twice daily  Continue Coreg 6.25 mg twice daily  Continue lisinopril 20 mg daily  Continue Glucophage 500 mg twice daily    Post procedure instructions reviewed   Device check on 12/26/2023  Follow up in office on 3/25/2024 with BRENDA    Seen outside global device for hypertension and persistent atrial fibrillation      Discharge Exam:  BP (!) 178/82   Pulse 59   Temp 98.1 °F (36.7 °C) (Oral)   Resp 18   Ht 1.753 m (5' 9\")   Wt 106.1 kg (234 lb)   SpO2 95%   BMI 34.56 kg/m²     General Appearance:    Alert, cooperative, no distress, appears stated age   Head:    Normocephalic, without obvious abnormality, atraumatic   Eyes:    PERRL, conjunctiva/corneas clear      Ears:    deferred   Nose:   Nares normal, septum midline, mucosa normal, no drainage  or sinus tenderness   Throat:   Lips, mucosa, and tongue normal; teeth and gums normal   Neck:   Supple, symmetrical, trachea midline, no  Phone: [phone]   carvedilol 6.25 MG tablet         Post device instructions given  Activity: as tolerated  Diet: cardiac diet    Britt MAYORGA  [facility]  [phone]     Time spent on discharge of patient: 35 minutes, including plan of care, patient education, and care coordination.

## 2023-12-26 ENCOUNTER — NURSE ONLY (OUTPATIENT)
Dept: CARDIOLOGY CLINIC | Age: 83
End: 2023-12-26

## 2023-12-26 ENCOUNTER — TELEPHONE (OUTPATIENT)
Dept: CARDIOLOGY CLINIC | Age: 83
End: 2023-12-26

## 2023-12-26 DIAGNOSIS — Z95.0 PACEMAKER: Primary | ICD-10-CM

## 2023-12-26 DIAGNOSIS — R00.1 BRADYCARDIA: ICD-10-CM

## 2023-12-26 NOTE — TELEPHONE ENCOUNTER
Called and spoke with patient and daughter. They have monitor plugged in at the bedside. Informed them they do not need to send a transmission today, just needed to plug monitor in and keep it at the bedside once it was received via mail. They v/u.

## 2023-12-26 NOTE — TELEPHONE ENCOUNTER
Pt wife would like to speak with DTCP about bedside monitor. Wife states they have tried to set it up but are unable to and [employer] is closed today. Please advise.

## 2023-12-26 NOTE — PATIENT INSTRUCTIONS
New Cardiac Device Implant (Pacemaker and/or Defibrillator) Post Op Instructions    Appointments to expect going forward:  Post operatively the patient will have had a 1-week post op check, a 1 month follow up with NP/MD, and a 3 month follow up with NP/MD and the device clinic. Remote Monitoring Instructions    Within 2-3 weeks of your device being implanted, you will receive a call from the  of your device. Please answer this call as it is to set up remote monitoring for your device. Once you receive your in-home monitor, please follow the instructions provided to sync the home monitor to your implanted device. Once you have paired your home monitor to your implanted device, keep your monitor plugged in within 6 feet of where you sleep. Please do not send additional routine transmissions unless specifically requested by the office staff - the steps to send a manual transmission are the same as when you paired your in-home monitor to your device for the first time. Please be aware that the remote device transmission sites are periodically monitored only during regular business hours during which simultaneous in-office device clinics are being conducted. If your transmission requires attention, we will contact you as soon as possible. Your in-home monitor will do a full report on your device every 3 months (recurring appointments that run parallel to in office checks). You will receive a reminder phone call to send your scheduled transmission approximately one week prior to the appointment. You do not need to initiate a transmission or be awake at the appointment time listed - this is solely for office purposes. Our office utilizes the \"no news is good news\" narrative regarding remote monitoring. A Device Specialist or RN will contact you with any critical findings from your remote monitoring.     Going forward, if you experience issues with your in-home monitor, please verify that it

## 2023-12-29 ENCOUNTER — CARE COORDINATION (OUTPATIENT)
Dept: CASE MANAGEMENT | Age: 83
End: 2023-12-29

## 2024-01-05 ENCOUNTER — CARE COORDINATION (OUTPATIENT)
Dept: CASE MANAGEMENT | Age: 84
End: 2024-01-05

## 2024-01-05 NOTE — CARE COORDINATION
Care Transitions Follow Up Call      Patient: Anup Del Cid  Patient : 1940   MRN: [id number]  Reason for Admission: AV block s/p pacemaker placed   Discharge Date: 23 RARS: Readmission Risk Score: 8.6    Attempted to reach patient via phone for transition call.  No option to leave message    Follow Up  Future Appointments   Date Time Provider Department Center   3/5/2024  9:30 AM Camilla Rios DO [facility]  [place] - DYD   3/19/2024  8:30 AM SCHEDULE, CLARE REMOTE TRANSMISSION [facility]   3/25/2024 10:00 AM SCHEDULE, [facility] DEVICE CHECK [place] [facility]   3/25/2024 10:00 AM Leann Edouard, APRN - CNP [facility]        Care Transitions Subsequent and Final Call    Subsequent and Final Calls  Care Transitions Interventions  Other Interventions:             Zoey Hong RN

## 2024-01-08 ENCOUNTER — CARE COORDINATION (OUTPATIENT)
Dept: CASE MANAGEMENT | Age: 84
End: 2024-01-08

## 2024-01-08 NOTE — CARE COORDINATION
Care Transitions Follow Up Call    Patient Current Location:  Home: [address]  [facility] [address]    Care Transition Nurse contacted the patient by telephone to follow up after admission.  Verified name and  with patient as identifiers.    Patient: Anup Del Cid  Patient : 1940   MRN: [id number]  Reason for Admission: AV block s/p pacemaker placed   Discharge Date: 23 RARS: Readmission Risk Score: 8.6      Needs to be reviewed by the provider   Additional needs identified to be addressed with provider: No  none             Method of communication with provider: none.    Patient answered call and verified . Patient pleasant and agreeable to transition call. Doing well since discharge and regaining energy back. More active and noticed dizziness if he bends down and stands back up. Denied any falls or injury. Patient is more cautious with bending and getting up slowly. Follow up visits reviewed. Taking medications as directed and denied any acute needs at present time.  Agreeable to f/u calls.  Educated on the use of urgent care or physician’s 24 hr access line if assistance is needed after hours.    Addressed changes since last contact:  none  Discussed follow-up appointments. If no appointment was previously scheduled, appointment scheduling offered: Yes.   Is follow up appointment scheduled within 7 days of discharge? Yes.    Follow Up  Future Appointments   Date Time Provider Department Center   3/5/2024  9:30 AM Camilla Rios DO [facility]  [place] - DYSARA   3/19/2024  8:30 AM SCHEDULE, CLARE REMOTE TRANSMISSION [facility]   3/25/2024 10:00 AM SCHEDULE, CLARE DEVICE CHECK [facility]   3/25/2024 10:00 AM Leann Edouard APRN - CNP [facility]     External follow up appointment(s):     Care Transition Nurse reviewed discharge instructions, medical action plan, and red flags with patient and discussed any barriers to care and/or understanding of plan of care after

## 2024-01-16 ENCOUNTER — CARE COORDINATION (OUTPATIENT)
Dept: CARE COORDINATION | Age: 84
End: 2024-01-16

## 2024-01-16 NOTE — CARE COORDINATION
Care Transitions Follow Up Call    Patient Current Location:  [place]    Care Transition Nurse contacted the patient by telephone to follow up after admission.  Verified name and  with patient as identifiers.    Patient: Anup Del Cid  Patient : 1940   MRN: [id number]  Reason for Admission: AV block; pacemaker  Discharge Date: 23 RARS: Readmission Risk Score: 8.6      Needs to be reviewed by the provider   Additional needs identified to be addressed with provider: No  none             Method of communication with provider: none.    Pt states he's doing well. He has not had any nose bleeds or other s/s of bleeding recently. Denies CP, palpitations or SOB. States he does get a little dizzy when he bends over and exercises caution with this. Denies falls or injury. Reports taking medications as prescribed. Denies questions or concerns at this time.     Addressed changes since last contact:  none  Discussed follow-up appointments. If no appointment was previously scheduled, appointment scheduling offered: Yes.   Is follow up appointment scheduled within 7 days of discharge? Yes.    Follow Up  Future Appointments   Date Time Provider Department Center   3/5/2024  9:30 AM Camilla Rios DO [facility]  [place] - DYD   3/19/2024  8:30 AM SCHEDULE, CLARE REMOTE TRANSMISSION [facility]   3/25/2024 10:00 AM SCHEDULE, CLARE DEVICE CHECK [employer] [facility]   3/25/2024 10:00 AM Leann Edouard, APRN - CNP [facility]     External follow up appointment(s):      Care Transition Nurse reviewed discharge instructions, medical action plan, and red flags with patient and discussed any barriers to care and/or understanding of plan of care after discharge. Discussed appropriate site of care based on symptoms and resources available to patient including: PCP  Specialist  When to call 911. The patient agrees to contact the PCP office for questions related to their healthcare.   Patients top risk factors for

## 2024-01-19 ENCOUNTER — CARE COORDINATION (OUTPATIENT)
Dept: CASE MANAGEMENT | Age: 84
End: 2024-01-19

## 2024-01-19 NOTE — CARE COORDINATION
Care Transitions Follow Up Call    Patient Current Location:  Home: [address]  [facility] [address]    Care Transition Nurse contacted the patient by telephone to follow up after admission.  Verified name and  with patient as identifiers.    Patient: Anup Del Cid  Patient : 1940   MRN: [id number]  Reason for Admission: AV block; pacemaker  Discharge Date: 23 RARS: Readmission Risk Score: 8.6      Needs to be reviewed by the provider   Additional needs identified to be addressed with provider: No  none             Method of communication with provider: none.    Patient answered call and verified . Patient pleasant and agreeable to final transition call. Doing well since last contact. Denied any pain or bruising. Incision is healed. Taking all medication as directed. Reviewed scheduled appointments. Denied any acute needs at present time. Stable and no further transition support needed. Episode ended.   Educated on the use of urgent care or physician’s 24 hr access line if assistance is needed after hours.    Addressed changes since last contact:  none  Discussed follow-up appointments. If no appointment was previously scheduled, appointment scheduling offered: Yes.   Is follow up appointment scheduled within 7 days of discharge? Yes.    Follow Up  Future Appointments   Date Time Provider Department Center   3/5/2024  9:30 AM Camilla Rois DO [facility]  [place] - DYD   3/19/2024  8:30 AM SCHEDULE, CLARE REMOTE TRANSMISSION [facility]   3/25/2024 10:00 AM SCHEDULE, CLARE DEVICE CHECK [facility]   3/25/2024 10:00 AM Leann Edouard, APRN - CNP [facility]     External follow up appointment(s):     Care Transition Nurse reviewed discharge instructions, medical action plan, and red flags with patient and discussed any barriers to care and/or understanding of plan of care after discharge. Discussed appropriate site of care based on symptoms and resources available to

## 2024-02-13 NOTE — TELEPHONE ENCOUNTER
Called patients wife and toled her to call central scheduling to schedule ECHO.  Told her that we would send in Lasix to her pharmacy.

## 2024-02-13 NOTE — TELEPHONE ENCOUNTER
He was supposed to have ECHO for EF and AS reassessment in 9/23 and I do not see that he had study. Please schedule ECHO for AS and leg swelling asap and start lasix 40mg po daily. Needs OV with me right after ECHO study completed.  Add onto office if need be at best spot possible.

## 2024-02-13 NOTE — TELEPHONE ENCOUNTER
Pt wife stated pt went to urologist for swelling in scrotum and legs. Urologist said pt had pitting edema. Pt had gained weight. Normally 220 but yesterday 2.12.2024 about 230-240. Pt has SOB upon exertion and light headed. Pt wife was asked about Bp and pt wife didn`t know it. Please advise

## 2024-02-13 NOTE — TELEPHONE ENCOUNTER
Pt will be returning call back to inform us when ECHO is scheduled, at that time please schedule pt appt.

## 2024-02-14 RX ORDER — FUROSEMIDE 40 MG/1
40 TABLET ORAL DAILY
Qty: 90 TABLET | Refills: 0 | Status: SHIPPED | OUTPATIENT
Start: 2024-02-14

## 2024-02-14 NOTE — TELEPHONE ENCOUNTER
Pt wife returned call. Pt scheduled for echo on 02.22.24 , pt scheduled with [facility]MM on 03.18.24. Wife would like medication called into [facility] in [facility]. Please advise.

## 2024-02-22 ENCOUNTER — HOSPITAL ENCOUNTER (OUTPATIENT)
Dept: NON INVASIVE DIAGNOSTICS | Age: 84
Discharge: HOME | End: 2024-02-22
Attending: INTERNAL MEDICINE
Payer: MEDICARE

## 2024-02-22 ENCOUNTER — TELEPHONE (OUTPATIENT)
Dept: CARDIOLOGY CLINIC | Age: 84
End: 2024-02-22

## 2024-02-22 DIAGNOSIS — Z79.899 MEDICATION MANAGEMENT: Primary | ICD-10-CM

## 2024-02-22 DIAGNOSIS — I35.0 NONRHEUMATIC AORTIC VALVE STENOSIS: ICD-10-CM

## 2024-02-22 DIAGNOSIS — R55 PRE-SYNCOPE: ICD-10-CM

## 2024-02-22 PROCEDURE — 93321 DOPPLER ECHO F-UP/LMTD STD: CPT

## 2024-02-22 PROCEDURE — 93308 TTE F-UP OR LMTD: CPT

## 2024-02-22 PROCEDURE — 93325 DOPPLER ECHO COLOR FLOW MAPG: CPT

## 2024-02-22 PROCEDURE — 93356 MYOCRD STRAIN IMG SPCKL TRCK: CPT

## 2024-02-22 PROCEDURE — 93319 3D ECHO IMG CGEN CAR ANOMAL: CPT

## 2024-02-22 NOTE — TELEPHONE ENCOUNTER
----- Message from Ramirez Santana MD sent at 2/22/2024  4:06 PM EST -----  I called and went over abnormal ECHO results and spoke to Mr. Del Cid and his wife. I also discussed case with Dr. Mccormick personally. Dr. Mccormick will have his nurse set Mr. Del Cid up for dobutamine stress ECHO and RHC/LHC at [facility] the same day. We need dobutamine stress ECHO to document severe AS which we believe he has and will require TAVR/stent possibly or SAVR with CABG depending on results of cath study. I explained this all to patient and his wife. They will await call from SANTOS Hernández to arrange. He is having TELLEZ and LE edema c/w CHF symptoms and lasix 40mg daily is helping. Please have BMP drawn 7-10 days after starting lasix. Thanks.

## 2024-02-22 NOTE — TELEPHONE ENCOUNTER
Called pt and relayed [facility]MM message about the need for BMP 7-10 after starting Lasix. Josefina stated that she will have pt complete blood work tomorrow or Monday. Order placed in chart.

## 2024-02-23 ENCOUNTER — HOSPITAL ENCOUNTER (OUTPATIENT)
Age: 84
Discharge: HOME | End: 2024-02-23
Payer: MEDICARE

## 2024-02-23 DIAGNOSIS — Z79.899 MEDICATION MANAGEMENT: ICD-10-CM

## 2024-02-23 LAB
ANION GAP SERPL CALCULATED.3IONS-SCNC: 10 MMOL/L (ref 3–16)
BUN SERPL-MCNC: 17 MG/DL (ref 7–20)
CALCIUM SERPL-MCNC: 9.8 MG/DL (ref 8.3–10.6)
CHLORIDE SERPL-SCNC: 100 MMOL/L (ref 99–110)
CO2 SERPL-SCNC: 28 MMOL/L (ref 21–32)
CREAT SERPL-MCNC: 0.7 MG/DL (ref 0.8–1.3)
GFR SERPLBLD CREATININE-BSD FMLA CKD-EPI: >60 ML/MIN/{1.73_M2}
GLUCOSE SERPL-MCNC: 117 MG/DL (ref 70–99)
POTASSIUM SERPL-SCNC: 3.8 MMOL/L (ref 3.5–5.1)
SODIUM SERPL-SCNC: 138 MMOL/L (ref 136–145)

## 2024-02-23 PROCEDURE — 80048 BASIC METABOLIC PNL TOTAL CA: CPT

## 2024-02-23 PROCEDURE — 36415 COLL VENOUS BLD VENIPUNCTURE: CPT

## 2024-02-26 ENCOUNTER — TELEPHONE (OUTPATIENT)
Dept: CARDIOLOGY CLINIC | Age: 84
End: 2024-02-26

## 2024-02-26 NOTE — TELEPHONE ENCOUNTER
----- Message from Preston Negrete MD sent at 2/26/2024  8:32 AM EST -----  Blood sugar 117   borderline high otherwise ok.  ----- Message -----  From: Angelicaoh Incoming Lab Results From [facility] (Epic Adt)  Sent: 2/23/2024   7:05 PM EST  To: Ramirez Santana MD

## 2024-02-26 NOTE — TELEPHONE ENCOUNTER
Patient is willing to proceed to see Dr. Mccormick as he has seen him in the past and willing to go to his next available whether it's [place] or [place].

## 2024-02-26 NOTE — TELEPHONE ENCOUNTER
----- Message from Preston Negrete MD sent at 2/26/2024  8:41 AM EST -----  Patient needs to be seen by TAVR clinic Dr Mccormick or Sukhdev   I have left a message for him on his phone.  He is suppose to call us back what he wants to do.  He may want to wait until Dr Santana comes back next week.  ----- Message -----  From: Kacie Morales Incoming Cardiovascular Orders From \[facility]\""  Sent: 2/22/2024   3:00 PM EST  To: Ramirez Santana MD

## 2024-02-27 ENCOUNTER — TELEPHONE (OUTPATIENT)
Dept: CARDIOLOGY | Age: 84
End: 2024-02-27

## 2024-02-27 DIAGNOSIS — I10 ESSENTIAL HYPERTENSION: Primary | ICD-10-CM

## 2024-02-27 DIAGNOSIS — R06.02 SOB (SHORTNESS OF BREATH): ICD-10-CM

## 2024-02-27 DIAGNOSIS — E78.00 HYPERCHOLESTEREMIA: ICD-10-CM

## 2024-02-27 DIAGNOSIS — I35.0 NONRHEUMATIC AORTIC VALVE STENOSIS: ICD-10-CM

## 2024-02-27 NOTE — TELEPHONE ENCOUNTER
Irma, per Dr. Mccormick, can you schedule pt for a Dobutamine stress echo and coronary angiogram same day at [facility]? Dobutamine echo to be done first followed by cath with Dr. Mccormick. Pt has no dye allergy. He will need to hold his Eliquis for 48 hours prior to cath. Thank you, Ml GRAHAM

## 2024-02-27 NOTE — TELEPHONE ENCOUNTER
Spoke to pt & wife, mailing all preps to residence.     TAVR testing is scheduled per below:    4-1-24   9:30 AM - [facility] DOBUTAMIN STRESS ECHO  Prep:  * NPO two hours prior  * Take all medications as instructed by physician  * Wear loose clothing  * Arrive 10 minutes prior  * If patients are earlier than 10 minutes they need to wait in their car.      4-1-24 / 1:00-2:30 pm - [facility] jennifer/Anny @ [facility]  PREPS:  Bring a list of your medications.  Please notify us before the procedure if you are allergic to anything, especially x-ray contrast dye, iodine, nickel, or any type of jewelry. This is especially important.   Do not eat or drink anything at all after midnight (or 8 hours) prior to the procedure.  Take all morning medications EXCEPT any diuretics Lasix, Metformin (water pills) the day of the procedure with a small amount of water.  Hold Eliquis 2 days prior to the procedure (including the day of the procedure). Do not take this after 3-29-24.   You MUST have someone to drive you home-NO driving for 24 hours after your procedure. If intervention is preformed, you might stay overnight in the hospital.  Discharge instructions will be given to you at the time of your procedure.

## 2024-03-05 ENCOUNTER — OFFICE VISIT (OUTPATIENT)
Dept: FAMILY MEDICINE CLINIC | Age: 84
End: 2024-03-05
Payer: MEDICARE

## 2024-03-05 VITALS
OXYGEN SATURATION: 97 % | WEIGHT: 234 LBS | BODY MASS INDEX: 34.56 KG/M2 | HEART RATE: 71 BPM | SYSTOLIC BLOOD PRESSURE: 150 MMHG | DIASTOLIC BLOOD PRESSURE: 78 MMHG

## 2024-03-05 VITALS
DIASTOLIC BLOOD PRESSURE: 78 MMHG | OXYGEN SATURATION: 97 % | WEIGHT: 234 LBS | SYSTOLIC BLOOD PRESSURE: 150 MMHG | HEART RATE: 71 BPM | BODY MASS INDEX: 34.66 KG/M2 | RESPIRATION RATE: 20 BRPM | HEIGHT: 69 IN

## 2024-03-05 DIAGNOSIS — D68.69 SECONDARY HYPERCOAGULABLE STATE (HCC): ICD-10-CM

## 2024-03-05 DIAGNOSIS — I48.21 PERMANENT ATRIAL FIBRILLATION (HCC): ICD-10-CM

## 2024-03-05 DIAGNOSIS — Z00.00 MEDICARE ANNUAL WELLNESS VISIT, SUBSEQUENT: Primary | ICD-10-CM

## 2024-03-05 DIAGNOSIS — I10 ESSENTIAL HYPERTENSION: ICD-10-CM

## 2024-03-05 DIAGNOSIS — E11.9 TYPE 2 DIABETES MELLITUS WITHOUT COMPLICATION, WITHOUT LONG-TERM CURRENT USE OF INSULIN (HCC): Primary | ICD-10-CM

## 2024-03-05 DIAGNOSIS — E78.2 MIXED HYPERLIPIDEMIA: ICD-10-CM

## 2024-03-05 LAB — HBA1C MFR BLD: 5.7 %

## 2024-03-05 PROCEDURE — G0439 PPPS, SUBSEQ VISIT: HCPCS | Performed by: FAMILY MEDICINE

## 2024-03-05 PROCEDURE — 1123F ACP DISCUSS/DSCN MKR DOCD: CPT | Performed by: FAMILY MEDICINE

## 2024-03-05 PROCEDURE — 3078F DIAST BP <80 MM HG: CPT | Performed by: FAMILY MEDICINE

## 2024-03-05 PROCEDURE — 3077F SYST BP >= 140 MM HG: CPT | Performed by: FAMILY MEDICINE

## 2024-03-05 PROCEDURE — 99214 OFFICE O/P EST MOD 30 MIN: CPT | Performed by: FAMILY MEDICINE

## 2024-03-05 PROCEDURE — 83036 HEMOGLOBIN GLYCOSYLATED A1C: CPT | Performed by: FAMILY MEDICINE

## 2024-03-05 PROCEDURE — 3044F HG A1C LEVEL LT 7.0%: CPT | Performed by: FAMILY MEDICINE

## 2024-03-05 SDOH — ECONOMIC STABILITY: INCOME INSECURITY: HOW HARD IS IT FOR YOU TO PAY FOR THE VERY BASICS LIKE FOOD, HOUSING, MEDICAL CARE, AND HEATING?: NOT HARD AT ALL

## 2024-03-05 SDOH — ECONOMIC STABILITY: FOOD INSECURITY: WITHIN THE PAST 12 MONTHS, THE FOOD YOU BOUGHT JUST DIDN'T LAST AND YOU DIDN'T HAVE MONEY TO GET MORE.: NEVER TRUE

## 2024-03-05 SDOH — ECONOMIC STABILITY: HOUSING INSECURITY
IN THE LAST 12 MONTHS, WAS THERE A TIME WHEN YOU DID NOT HAVE A STEADY PLACE TO SLEEP OR SLEPT IN A SHELTER (INCLUDING NOW)?: NO

## 2024-03-05 SDOH — ECONOMIC STABILITY: FOOD INSECURITY: WITHIN THE PAST 12 MONTHS, YOU WORRIED THAT YOUR FOOD WOULD RUN OUT BEFORE YOU GOT MONEY TO BUY MORE.: NEVER TRUE

## 2024-03-05 ASSESSMENT — PATIENT HEALTH QUESTIONNAIRE - PHQ9
SUM OF ALL RESPONSES TO PHQ QUESTIONS 1-9: 0
6. FEELING BAD ABOUT YOURSELF - OR THAT YOU ARE A FAILURE OR HAVE LET YOURSELF OR YOUR FAMILY DOWN: 0
SUM OF ALL RESPONSES TO PHQ QUESTIONS 1-9: 0
2. FEELING DOWN, DEPRESSED OR HOPELESS: 0
SUM OF ALL RESPONSES TO PHQ9 QUESTIONS 1 & 2: 0
3. TROUBLE FALLING OR STAYING ASLEEP: 0
9. THOUGHTS THAT YOU WOULD BE BETTER OFF DEAD, OR OF HURTING YOURSELF: 0
SUM OF ALL RESPONSES TO PHQ QUESTIONS 1-9: 0
8. MOVING OR SPEAKING SO SLOWLY THAT OTHER PEOPLE COULD HAVE NOTICED. OR THE OPPOSITE, BEING SO FIGETY OR RESTLESS THAT YOU HAVE BEEN MOVING AROUND A LOT MORE THAN USUAL: 0
1. LITTLE INTEREST OR PLEASURE IN DOING THINGS: 0
10. IF YOU CHECKED OFF ANY PROBLEMS, HOW DIFFICULT HAVE THESE PROBLEMS MADE IT FOR YOU TO DO YOUR WORK, TAKE CARE OF THINGS AT HOME, OR GET ALONG WITH OTHER PEOPLE: 0
4. FEELING TIRED OR HAVING LITTLE ENERGY: 0
5. POOR APPETITE OR OVEREATING: 0
7. TROUBLE CONCENTRATING ON THINGS, SUCH AS READING THE NEWSPAPER OR WATCHING TELEVISION: 0
SUM OF ALL RESPONSES TO PHQ QUESTIONS 1-9: 0

## 2024-03-05 ASSESSMENT — LIFESTYLE VARIABLES
HOW OFTEN DURING THE LAST YEAR HAVE YOU FAILED TO DO WHAT WAS NORMALLY EXPECTED FROM YOU BECAUSE OF DRINKING: 0
HOW MANY STANDARD DRINKS CONTAINING ALCOHOL DO YOU HAVE ON A TYPICAL DAY: 3 OR 4
HOW OFTEN DURING THE LAST YEAR HAVE YOU NEEDED AN ALCOHOLIC DRINK FIRST THING IN THE MORNING TO GET YOURSELF GOING AFTER A NIGHT OF HEAVY DRINKING: 0
HAS A RELATIVE, FRIEND, DOCTOR, OR ANOTHER HEALTH PROFESSIONAL EXPRESSED CONCERN ABOUT YOUR DRINKING OR SUGGESTED YOU CUT DOWN: 0
HAVE YOU OR SOMEONE ELSE BEEN INJURED AS A RESULT OF YOUR DRINKING: 0
HOW OFTEN DURING THE LAST YEAR HAVE YOU HAD A FEELING OF GUILT OR REMORSE AFTER DRINKING: 0
HOW OFTEN DURING THE LAST YEAR HAVE YOU BEEN UNABLE TO REMEMBER WHAT HAPPENED THE NIGHT BEFORE BECAUSE YOU HAD BEEN DRINKING: 0
HOW OFTEN DO YOU HAVE A DRINK CONTAINING ALCOHOL: 4 OR MORE TIMES A WEEK
HOW OFTEN DURING THE LAST YEAR HAVE YOU FOUND THAT YOU WERE NOT ABLE TO STOP DRINKING ONCE YOU HAD STARTED: 0

## 2024-03-05 NOTE — PROGRESS NOTES
Medicare Annual Wellness Visit    Anup Del Cid is here for Medicare AWV    Assessment & Plan   Medicare annual wellness visit, subsequent  Recommendations for Preventive Services Due: see orders and patient instructions/AVS.  Recommended screening schedule for the next 5-10 years is provided to the patient in written form: see Patient Instructions/AVS.     No follow-ups on file.     Subjective       Patient's complete Health Risk Assessment and screening values have been reviewed and are found in Flowsheets. The following problems were reviewed today and where indicated follow up appointments were made and/or referrals ordered.    Positive Risk Factor Screenings with Interventions:         Alcohol Screening:  Alcohol Use: Heavy Drinker (3/5/2024)    AUDIT-C     Frequency of Alcohol Consumption: 4 or more times a week     Average Number of Drinks: 3 or 4     Frequency of Binge Drinking: Less than monthly      AUDIT-C Score: 6   AUDIT Total Score: 6    Interpretation of AUDIT-C score:   3-7 indicates potential alcohol risk.    8 or more is associated with harmful or hazardous drinking.   13 or more in women, and 15 or more in men, is likely to indicate alcohol dependence.  Interventions:  See AVS for additional education material              Activity, Diet, and Weight:  On average, how many days per week do you engage in moderate to strenuous exercise (like a brisk walk)?: 0 days  On average, how many minutes do you engage in exercise at this level?: 0 min    Do you eat balanced/healthy meals regularly?: Yes    Body mass index is 34.56 kg/m². (!) Abnormal      Inactivity Interventions:  See AVS for additional education material  Obesity Interventions:  See AVS for additional education material              Vision Screen:  Do you have difficulty driving, watching TV, or doing any of your daily activities because of your eyesight?: No  Have you had an eye exam within the past year?: (!) No  No results

## 2024-03-05 NOTE — PROGRESS NOTES
Head: Normocephalic.   Neurological:      Mental Status: He is alert.   Psychiatric:         Mood and Affect: Mood normal.         Behavior: Behavior normal.         Thought Content: Thought content normal.         Current Outpatient Medications   Medication Sig Dispense Refill    furosemide (LASIX) 40 MG tablet Take 1 tablet by mouth daily 90 tablet 0    carvedilol (COREG) 6.25 MG tablet Take 1 tablet by mouth 2 times daily (with meals) 60 tablet 3    metFORMIN (GLUCOPHAGE) 500 MG tablet Take 2 tablets by mouth daily (with breakfast) 180 tablet 1    lisinopril (PRINIVIL;ZESTRIL) 20 MG tablet TAKE 1/2 TABLET BY MOUTH EVERY DAY 45 tablet 3    apixaban (ELIQUIS) 5 MG TABS tablet TAKE 1 TABLET TWICE A  tablet 1    atorvastatin (LIPITOR) 40 MG tablet TAKE 1 TABLET BY MOUTH EVERY DAY 90 tablet 1    Multiple Vitamins-Minerals (THERAPEUTIC MULTIVITAMIN-MINERALS) tablet Take 1 tablet by mouth daily       No current facility-administered medications for this visit.       Assessment:    1. Type 2 diabetes mellitus without complication, without long-term current use of insulin (HCC)    2. Essential hypertension    3. Mixed hyperlipidemia    4. Permanent atrial fibrillation (HCC)    5. Secondary hypercoagulable state (HCC)          Plan:     1.  Type 2 diabetes mellitus without complication, without long-term current use of insulin (HCC)  Stable. Continue current medications.   - POCT glycosylated hemoglobin (Hb A1C) 5.7  - metFORMIN (GLUCOPHAGE) 500 MG tablet; Take 1 tablet by mouth 2 times daily (with meals)  Dispense: 180 tablet; Refill: 3  - CBC Auto Differential  - Comprehensive Metabolic Panel    2. Essential hypertension  Stable. Continue current medications.   - atenolol-chlorthalidone (TENORETIC) 100-25 MG per tablet; Take 1 tablet by mouth daily  Dispense: 90 tablet; Refill: 3  - lisinopril (PRINIVIL;ZESTRIL) 20 MG tablet; Take 1 tablet by mouth daily  Dispense: 90 tablet; Refill: 3  - CBC Auto

## 2024-03-15 NOTE — PROGRESS NOTES
[facility]   Cardiac Follow Up    Referring Provider:  Camilla Rios DO   Originally saw 1/20  C/O baseline TELLEZ today     Subjective: Anup Del Cid presents for cardiology follow up AS, afib, HTN, HLD; c/o TELLEZ, fatigue, and swelling toay    History of Present Illness:    Anup Del Cid is a 83 y.o. 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 Alberts, arthritis, s/p left THR 12/21, gout, DM, HTN, HLD, moderate AS, s/p L THR 12/21, chronic LE edema, and prostate cancer s/p radiation seeds. ECHO 1/10/2020 EF=55-60%, mild to mod AS (velocity=2.99m/s; MPG=19mmHg). Rocio nuc 8/15 normal.      ECHO 3/21 moderate AS (velocity=3.83m/s; MPG=36mmHg). Due to worsened AS with quick progression from 1/20 study I referred to Dr. Mccormick who he did not qualify for TAVR at the time. ECHO 4/12/22 normal EF of 55% and moderate AS (velocity is 3.33m/s and MPG 25mmHg). SUKHDEV 09/28/23 to 10/12/23 - predominately AF average HR of 46 (). PVC burden 3.60%   Admitted to [facility] 12/19-12/20/23 due to bradycardia. s/p leadless Micra implant on 12/19/2023 with Dr Alberts.   Limited Echo 2/22/24 showed severe LVH, severe AS with peak velocity 3.86 m/s, MPG of 33 mmHg, and CLAUDY by planimetry of 0.63 cm2.  Mod MR and TR. I referred to Dr Mccormick for further evaluation of TAVR. Scheduled for dobutamine stress Echo and LHC with Dr Mccormick on 4/1/24.   Today, reports feeling ongoing fatigue and TELLEZ. I placed him on lasix 40mg qd and swelling better overall but still present. .He says his mobile device is not working and plans to follow up with device clinic. Patient denies current edema, chest pain, sob, palpitations, dizziness or syncope. Originally from [place] and is a retired .      Weight today is 224# ( down 8# from 9/2023) 232    Past Medical History:   has a past medical history of Arthritis, Chronic gout without tophus, Diabetes mellitus (HCC), ED (erectile dysfunction),

## 2024-03-18 ENCOUNTER — TELEPHONE (OUTPATIENT)
Dept: CARDIOLOGY CLINIC | Age: 84
End: 2024-03-18

## 2024-03-18 ENCOUNTER — OFFICE VISIT (OUTPATIENT)
Dept: CARDIOLOGY CLINIC | Age: 84
End: 2024-03-18
Payer: MEDICARE

## 2024-03-18 VITALS
OXYGEN SATURATION: 95 % | DIASTOLIC BLOOD PRESSURE: 52 MMHG | SYSTOLIC BLOOD PRESSURE: 108 MMHG | HEART RATE: 79 BPM | BODY MASS INDEX: 33.95 KG/M2 | HEIGHT: 68 IN | WEIGHT: 224 LBS

## 2024-03-18 DIAGNOSIS — I10 ESSENTIAL HYPERTENSION: ICD-10-CM

## 2024-03-18 DIAGNOSIS — E78.2 MIXED HYPERLIPIDEMIA: ICD-10-CM

## 2024-03-18 PROCEDURE — 3078F DIAST BP <80 MM HG: CPT | Performed by: INTERNAL MEDICINE

## 2024-03-18 PROCEDURE — 99215 OFFICE O/P EST HI 40 MIN: CPT | Performed by: INTERNAL MEDICINE

## 2024-03-18 PROCEDURE — 1123F ACP DISCUSS/DSCN MKR DOCD: CPT | Performed by: INTERNAL MEDICINE

## 2024-03-18 PROCEDURE — 3074F SYST BP LT 130 MM HG: CPT | Performed by: INTERNAL MEDICINE

## 2024-03-18 RX ORDER — LISINOPRIL 20 MG/1
TABLET ORAL
Qty: 45 TABLET | Refills: 3 | Status: SHIPPED | OUTPATIENT
Start: 2024-03-18

## 2024-03-18 RX ORDER — CARVEDILOL 6.25 MG/1
6.25 TABLET ORAL 2 TIMES DAILY WITH MEALS
Qty: 180 TABLET | Refills: 3 | Status: SHIPPED | OUTPATIENT
Start: 2024-03-18

## 2024-03-18 RX ORDER — ATORVASTATIN CALCIUM 40 MG/1
TABLET, FILM COATED ORAL
Qty: 90 TABLET | Refills: 3 | Status: SHIPPED | OUTPATIENT
Start: 2024-03-18

## 2024-03-18 NOTE — TELEPHONE ENCOUNTER
Pts wife Ximena stated that they spoke with [facility] regarding the remote device transmission that he has on 03/19/24. They tried to send in the transmission early but it was not working. While pt saw [facility] on 03/18 they advised them to call [employer]. Ximena called [employer] and they stated that the device number and the serial number that they have do not match. Please advise.

## 2024-03-18 NOTE — PATIENT INSTRUCTIONS
Plan:  Current medications reviewed.  No changes at this time. Refills given as warranted.  Labs reviewed. Annual lab work due - Fasting lipid and BMP at your soonest convenience. You must be fasting   Follow up as scheduled with NP TANYA Noriega 3/25/24  Proceed with R/LHC and dobutamine stress test as scheduled on 4/1/24 at [facility]  Follow up with me in 4 months

## 2024-03-18 NOTE — TELEPHONE ENCOUNTER
LVM for patient's wife to call the office back. I have corrected the serial number issue on our end, please re-try sending transmission. Should go through now. There was an issue w/ how the device was registered at implant - it has been corrected. Thanks!

## 2024-03-19 ENCOUNTER — HOSPITAL ENCOUNTER (OUTPATIENT)
Age: 84
Discharge: HOME | End: 2024-03-19
Payer: MEDICARE

## 2024-03-19 ENCOUNTER — NURSE ONLY (OUTPATIENT)
Dept: CARDIOLOGY CLINIC | Age: 84
End: 2024-03-19

## 2024-03-19 DIAGNOSIS — I10 ESSENTIAL HYPERTENSION: ICD-10-CM

## 2024-03-19 DIAGNOSIS — E78.2 MIXED HYPERLIPIDEMIA: ICD-10-CM

## 2024-03-19 DIAGNOSIS — Z95.0 PACEMAKER: Primary | ICD-10-CM

## 2024-03-19 LAB
ANION GAP SERPL CALCULATED.3IONS-SCNC: 12 MMOL/L (ref 3–16)
BUN SERPL-MCNC: 20 MG/DL (ref 7–20)
CALCIUM SERPL-MCNC: 9.8 MG/DL (ref 8.3–10.6)
CHLORIDE SERPL-SCNC: 104 MMOL/L (ref 99–110)
CHOLEST SERPL-MCNC: 126 MG/DL (ref 0–199)
CO2 SERPL-SCNC: 25 MMOL/L (ref 21–32)
CREAT SERPL-MCNC: 0.6 MG/DL (ref 0.8–1.3)
GFR SERPLBLD CREATININE-BSD FMLA CKD-EPI: >60 ML/MIN/{1.73_M2}
GLUCOSE SERPL-MCNC: 100 MG/DL (ref 70–99)
HDLC SERPL-MCNC: 56 MG/DL (ref 40–60)
LDL CHOLESTEROL CALCULATED: 61 MG/DL
POTASSIUM SERPL-SCNC: 4.2 MMOL/L (ref 3.5–5.1)
SODIUM SERPL-SCNC: 141 MMOL/L (ref 136–145)
TRIGL SERPL-MCNC: 47 MG/DL (ref 0–150)
VLDLC SERPL CALC-MCNC: 9 MG/DL

## 2024-03-19 PROCEDURE — 80048 BASIC METABOLIC PNL TOTAL CA: CPT

## 2024-03-19 PROCEDURE — 36415 COLL VENOUS BLD VENIPUNCTURE: CPT

## 2024-03-19 PROCEDURE — 80061 LIPID PANEL: CPT

## 2024-03-19 NOTE — TELEPHONE ENCOUNTER
[facility] for Ximena, patient wife. When she returns call please relay Raquel Santillan's message. Thanks.

## 2024-03-20 ENCOUNTER — TELEPHONE (OUTPATIENT)
Dept: CARDIOLOGY CLINIC | Age: 84
End: 2024-03-20

## 2024-03-20 DIAGNOSIS — E78.2 MIXED HYPERLIPIDEMIA: ICD-10-CM

## 2024-03-20 DIAGNOSIS — Z79.899 MEDICATION MANAGEMENT: ICD-10-CM

## 2024-03-20 DIAGNOSIS — Z79.899 MEDICATION MANAGEMENT: Primary | ICD-10-CM

## 2024-03-20 LAB
ALBUMIN SERPL-MCNC: 4.5 G/DL (ref 3.4–5)
ALP SERPL-CCNC: 334 U/L (ref 40–129)
ALT SERPL-CCNC: 28 U/L (ref 10–40)
AST SERPL-CCNC: 27 U/L (ref 15–37)
BILIRUB DIRECT SERPL-MCNC: 0.4 MG/DL (ref 0–0.3)
BILIRUB INDIRECT SERPL-MCNC: 1.1 MG/DL (ref 0–1)
BILIRUB SERPL-MCNC: 1.5 MG/DL (ref 0–1)
PROT SERPL-MCNC: 7.2 G/DL (ref 6.4–8.2)

## 2024-03-20 PROCEDURE — 93294 REM INTERROG EVL PM/LDLS PM: CPT | Performed by: INTERNAL MEDICINE

## 2024-03-20 PROCEDURE — 93296 REM INTERROG EVL PM/IDS: CPT | Performed by: INTERNAL MEDICINE

## 2024-03-20 NOTE — TELEPHONE ENCOUNTER
----- Message from Ramirez Santana MD sent at 3/20/2024  7:48 AM EDT -----  Excellent labs. Please add on LFT's if able. Thanks.

## 2024-03-25 ENCOUNTER — OFFICE VISIT (OUTPATIENT)
Dept: CARDIOLOGY CLINIC | Age: 84
End: 2024-03-25
Payer: MEDICARE

## 2024-03-25 VITALS
HEIGHT: 68 IN | SYSTOLIC BLOOD PRESSURE: 110 MMHG | WEIGHT: 224.4 LBS | DIASTOLIC BLOOD PRESSURE: 60 MMHG | OXYGEN SATURATION: 100 % | HEART RATE: 63 BPM | BODY MASS INDEX: 34.01 KG/M2

## 2024-03-25 DIAGNOSIS — I44.30 AV BLOCK: ICD-10-CM

## 2024-03-25 DIAGNOSIS — I48.91 ATRIAL FIBRILLATION, UNSPECIFIED TYPE (HCC): Primary | ICD-10-CM

## 2024-03-25 DIAGNOSIS — Z95.0 PACEMAKER: ICD-10-CM

## 2024-03-25 DIAGNOSIS — R00.1 BRADYCARDIA: ICD-10-CM

## 2024-03-25 PROCEDURE — 1123F ACP DISCUSS/DSCN MKR DOCD: CPT

## 2024-03-25 PROCEDURE — 3078F DIAST BP <80 MM HG: CPT

## 2024-03-25 PROCEDURE — 3074F SYST BP LT 130 MM HG: CPT

## 2024-03-25 PROCEDURE — 93000 ELECTROCARDIOGRAM COMPLETE: CPT

## 2024-03-25 PROCEDURE — 99214 OFFICE O/P EST MOD 30 MIN: CPT

## 2024-03-25 NOTE — PROGRESS NOTES
[facility]   Electrophysiology Outpatient Note              Date:  March 25, 2024  Patient name: Anup Del Cid  YOB: 1940    Primary Care physician: Camilla Rios DO    HISTORY OF PRESENT ILLNESS: The patient is a 83 y.o.  male with a history of persistent atrial fibrillation, bradycardia-MICRA PPM, hypertension, severe aortic stenosis, hyperlipidemia, diabetes,     Patient is followed by  in EP clinic.  History of persistent atrial fibrillation with bradycardia. Patient wore cardiac event monitor 09/28/2023 to 10/12/2023-predominately AF average HR of 46 (). PVC burden 3.60%.   Patient had Micra implanted 12/19/2023    Today he is being seen for persistent atrial fibrillation and management of Micra.  Remote device check on 3/20/2024 revealed VVIR mode,  95.7%, VS 4.3%. ECG shows Paced with a HR of 63. Patient is accompanied by his wife today.  Patient denies chest pain and palpitations.  States he gets short of breath with activity such as going up the stairs.  He will have some dizziness if he bends down and stands back up.  He is scheduled for cardiac catheterization 4/1/2024 for evaluation of TAVR versus SAVR.  He recently saw Dr. Kaufman in the office on 3/18/2024 and was noted to have lower extremity edema.  He was started on Lasix daily.  Patient states he is lost a great deal of weight since starting it.  He still has lower extremity edema today.  Discussed low-sodium diet.  He will have BMP drawn this Friday    Past Medical History:   has a past medical history of Arthritis, Chronic gout without tophus, Diabetes mellitus (HCC), ED (erectile dysfunction), Erectile dysfunction, Hyperlipidemia, Hypertension, Nocturia, Pre-diabetes, and Prostate CA (HCC).    Past Surgical History:   has a past surgical history that includes Prostate surgery; Tonsillectomy; Knee arthroscopy; knee surgery (Left, 1963); Colonoscopy (9/24/14); joint replacement (Left,

## 2024-03-25 NOTE — PATIENT INSTRUCTIONS
Continue Eliquis 5 mg twice daily  Continue Coreg 6.25 mg twice daily  Continue lisinopril 20 mg daily  Continue Glucophage 500 mg twice daily  Continue Lipitor 40 mg daily  Continue Lasix 40 mg daily  Weight weekly  Low sodium diet please  Blood work this Friday  Plan for cardiac cath 4/1/2024 to evaluate for TAVR vs SAVR  Follow up with Dr Santana in July 23,2024  Follow up TANYA Noriega CNP September 2024

## 2024-03-29 ENCOUNTER — HOSPITAL ENCOUNTER (OUTPATIENT)
Age: 84
Discharge: HOME | End: 2024-03-29
Payer: MEDICARE

## 2024-03-29 DIAGNOSIS — I48.91 ATRIAL FIBRILLATION, UNSPECIFIED TYPE (HCC): ICD-10-CM

## 2024-03-29 LAB
ALBUMIN SERPL-MCNC: 4.4 G/DL (ref 3.4–5)
ANION GAP SERPL CALCULATED.3IONS-SCNC: 12 MMOL/L (ref 3–16)
BUN SERPL-MCNC: 17 MG/DL (ref 7–20)
CALCIUM SERPL-MCNC: 9.5 MG/DL (ref 8.3–10.6)
CHLORIDE SERPL-SCNC: 101 MMOL/L (ref 99–110)
CO2 SERPL-SCNC: 24 MMOL/L (ref 21–32)
CREAT SERPL-MCNC: 0.6 MG/DL (ref 0.8–1.3)
GFR SERPLBLD CREATININE-BSD FMLA CKD-EPI: >90 ML/MIN/{1.73_M2}
GLUCOSE SERPL-MCNC: 102 MG/DL (ref 70–99)
PHOSPHATE SERPL-MCNC: 3.4 MG/DL (ref 2.5–4.9)
POTASSIUM SERPL-SCNC: 4.3 MMOL/L (ref 3.5–5.1)
SODIUM SERPL-SCNC: 137 MMOL/L (ref 136–145)

## 2024-03-29 PROCEDURE — 36415 COLL VENOUS BLD VENIPUNCTURE: CPT

## 2024-03-29 PROCEDURE — 80069 RENAL FUNCTION PANEL: CPT

## 2024-03-29 NOTE — PROGRESS NOTES
When patient arrives for dobutamine stress echo, please place PIV in left arm.    Jessica Velasquez RN, Stress Lab

## 2024-04-01 ENCOUNTER — HOSPITAL ENCOUNTER (OUTPATIENT)
Dept: NON INVASIVE DIAGNOSTICS | Age: 84
Discharge: HOME | DRG: 322 | End: 2024-04-01
Payer: MEDICARE

## 2024-04-01 ENCOUNTER — HOSPITAL ENCOUNTER (INPATIENT)
Dept: CARDIAC CATH/INVASIVE PROCEDURES | Age: 84
LOS: 2 days | Discharge: HOME | DRG: 322 | End: 2024-04-03
Attending: INTERNAL MEDICINE | Admitting: INTERNAL MEDICINE
Payer: MEDICARE

## 2024-04-01 ENCOUNTER — APPOINTMENT (OUTPATIENT)
Dept: CT IMAGING | Age: 84
DRG: 322 | End: 2024-04-01
Attending: INTERNAL MEDICINE
Payer: MEDICARE

## 2024-04-01 DIAGNOSIS — R06.02 SOB (SHORTNESS OF BREATH): ICD-10-CM

## 2024-04-01 DIAGNOSIS — I35.0 NONRHEUMATIC AORTIC VALVE STENOSIS: ICD-10-CM

## 2024-04-01 LAB
ANION GAP SERPL CALCULATED.3IONS-SCNC: 6 MMOL/L (ref 3–16)
BUN SERPL-MCNC: 21 MG/DL (ref 7–20)
CALCIUM SERPL-MCNC: 9.6 MG/DL (ref 8.3–10.6)
CHLORIDE SERPL-SCNC: 102 MMOL/L (ref 99–110)
CO2 SERPL-SCNC: 28 MMOL/L (ref 21–32)
CREAT SERPL-MCNC: 0.6 MG/DL (ref 0.8–1.3)
DEPRECATED RDW RBC AUTO: 17.7 % (ref 12.4–15.4)
DEPRECATED RDW RBC AUTO: 17.7 % (ref 12.4–15.4)
GFR SERPLBLD CREATININE-BSD FMLA CKD-EPI: >90 ML/MIN/{1.73_M2}
GLUCOSE SERPL-MCNC: 121 MG/DL (ref 70–99)
HCT VFR BLD AUTO: 37 % (ref 40.5–52.5)
HCT VFR BLD AUTO: 37.5 % (ref 40.5–52.5)
HGB BLD-MCNC: 12.4 G/DL (ref 13.5–17.5)
HGB BLD-MCNC: 12.5 G/DL (ref 13.5–17.5)
MCH RBC QN AUTO: 26.7 PG (ref 26–34)
MCH RBC QN AUTO: 27.1 PG (ref 26–34)
MCHC RBC AUTO-ENTMCNC: 33.4 G/DL (ref 31–36)
MCHC RBC AUTO-ENTMCNC: 33.4 G/DL (ref 31–36)
MCV RBC AUTO: 80.1 FL (ref 80–100)
MCV RBC AUTO: 81 FL (ref 80–100)
PLATELET # BLD AUTO: 171 K/UL (ref 135–450)
PLATELET # BLD AUTO: 179 K/UL (ref 135–450)
PMV BLD AUTO: 7.7 FL (ref 5–10.5)
PMV BLD AUTO: 7.8 FL (ref 5–10.5)
POTASSIUM SERPL-SCNC: 4.5 MMOL/L (ref 3.5–5.1)
RBC # BLD AUTO: 4.57 M/UL (ref 4.2–5.9)
RBC # BLD AUTO: 4.68 M/UL (ref 4.2–5.9)
SODIUM SERPL-SCNC: 136 MMOL/L (ref 136–145)
WBC # BLD AUTO: 4.5 K/UL (ref 4–11)
WBC # BLD AUTO: 5.3 K/UL (ref 4–11)

## 2024-04-01 PROCEDURE — 85027 COMPLETE CBC AUTOMATED: CPT

## 2024-04-01 PROCEDURE — 36415 COLL VENOUS BLD VENIPUNCTURE: CPT

## 2024-04-01 PROCEDURE — 99152 MOD SED SAME PHYS/QHP 5/>YRS: CPT | Performed by: INTERNAL MEDICINE

## 2024-04-01 PROCEDURE — 99152 MOD SED SAME PHYS/QHP 5/>YRS: CPT

## 2024-04-01 PROCEDURE — 93321 DOPPLER ECHO F-UP/LMTD STD: CPT

## 2024-04-01 PROCEDURE — 4A023N7 MEASUREMENT OF CARDIAC SAMPLING AND PRESSURE, LEFT HEART, PERCUTANEOUS APPROACH: ICD-10-PCS | Performed by: INTERNAL MEDICINE

## 2024-04-01 PROCEDURE — 93325 DOPPLER ECHO COLOR FLOW MAPG: CPT

## 2024-04-01 PROCEDURE — 2500000003 HC RX 250 WO HCPCS

## 2024-04-01 PROCEDURE — 6360000002 HC RX W HCPCS

## 2024-04-01 PROCEDURE — C1769 GUIDE WIRE: HCPCS

## 2024-04-01 PROCEDURE — 93351 STRESS TTE COMPLETE: CPT

## 2024-04-01 PROCEDURE — C1894 INTRO/SHEATH, NON-LASER: HCPCS

## 2024-04-01 PROCEDURE — 6370000000 HC RX 637 (ALT 250 FOR IP): Performed by: INTERNAL MEDICINE

## 2024-04-01 PROCEDURE — 74174 CTA ABD&PLVS W/CONTRAST: CPT

## 2024-04-01 PROCEDURE — 93454 CORONARY ARTERY ANGIO S&I: CPT

## 2024-04-01 PROCEDURE — 2060000000 HC ICU INTERMEDIATE R&B

## 2024-04-01 PROCEDURE — 6360000004 HC RX CONTRAST MEDICATION: Performed by: INTERNAL MEDICINE

## 2024-04-01 PROCEDURE — 80048 BASIC METABOLIC PNL TOTAL CA: CPT

## 2024-04-01 PROCEDURE — 6360000002 HC RX W HCPCS: Performed by: INTERNAL MEDICINE

## 2024-04-01 PROCEDURE — 2580000003 HC RX 258: Performed by: INTERNAL MEDICINE

## 2024-04-01 PROCEDURE — 93454 CORONARY ARTERY ANGIO S&I: CPT | Performed by: INTERNAL MEDICINE

## 2024-04-01 PROCEDURE — 99153 MOD SED SAME PHYS/QHP EA: CPT

## 2024-04-01 PROCEDURE — 2709999900 HC NON-CHARGEABLE SUPPLY

## 2024-04-01 PROCEDURE — 027034Z DILATION OF CORONARY ARTERY, ONE ARTERY WITH DRUG-ELUTING INTRALUMINAL DEVICE, PERCUTANEOUS APPROACH: ICD-10-PCS | Performed by: INTERNAL MEDICINE

## 2024-04-01 PROCEDURE — B2151ZZ FLUOROSCOPY OF LEFT HEART USING LOW OSMOLAR CONTRAST: ICD-10-PCS | Performed by: INTERNAL MEDICINE

## 2024-04-01 PROCEDURE — B2111ZZ FLUOROSCOPY OF MULTIPLE CORONARY ARTERIES USING LOW OSMOLAR CONTRAST: ICD-10-PCS | Performed by: INTERNAL MEDICINE

## 2024-04-01 RX ORDER — SODIUM CHLORIDE 0.9 % (FLUSH) 0.9 %
5-40 SYRINGE (ML) INJECTION EVERY 12 HOURS SCHEDULED
Status: DISCONTINUED | OUTPATIENT
Start: 2024-04-01 | End: 2024-04-03 | Stop reason: HOSPADM

## 2024-04-01 RX ORDER — CLOPIDOGREL BISULFATE 75 MG/1
75 TABLET ORAL DAILY
Status: DISCONTINUED | OUTPATIENT
Start: 2024-04-01 | End: 2024-04-03 | Stop reason: HOSPADM

## 2024-04-01 RX ORDER — ACETAMINOPHEN 325 MG/1
650 TABLET ORAL EVERY 4 HOURS PRN
Status: DISCONTINUED | OUTPATIENT
Start: 2024-04-01 | End: 2024-04-03

## 2024-04-01 RX ORDER — SODIUM CHLORIDE 9 MG/ML
INJECTION, SOLUTION INTRAVENOUS PRN
Status: DISCONTINUED | OUTPATIENT
Start: 2024-04-01 | End: 2024-04-03 | Stop reason: HOSPADM

## 2024-04-01 RX ORDER — SODIUM CHLORIDE 0.9 % (FLUSH) 0.9 %
5-40 SYRINGE (ML) INJECTION PRN
Status: DISCONTINUED | OUTPATIENT
Start: 2024-04-01 | End: 2024-04-03 | Stop reason: HOSPADM

## 2024-04-01 RX ORDER — SODIUM CHLORIDE 9 MG/ML
INJECTION, SOLUTION INTRAVENOUS PRN
Status: DISCONTINUED | OUTPATIENT
Start: 2024-04-01 | End: 2024-04-01 | Stop reason: HOSPADM

## 2024-04-01 RX ORDER — SODIUM CHLORIDE 0.9 % (FLUSH) 0.9 %
5-40 SYRINGE (ML) INJECTION EVERY 12 HOURS SCHEDULED
Status: DISCONTINUED | OUTPATIENT
Start: 2024-04-01 | End: 2024-04-01 | Stop reason: HOSPADM

## 2024-04-01 RX ORDER — SODIUM CHLORIDE 0.9 % (FLUSH) 0.9 %
5-40 SYRINGE (ML) INJECTION PRN
Status: DISCONTINUED | OUTPATIENT
Start: 2024-04-01 | End: 2024-04-01 | Stop reason: HOSPADM

## 2024-04-01 RX ORDER — DOBUTAMINE HYDROCHLORIDE 200 MG/100ML
5 INJECTION INTRAVENOUS CONTINUOUS
Status: DISCONTINUED | OUTPATIENT
Start: 2024-04-01 | End: 2024-04-02 | Stop reason: HOSPADM

## 2024-04-01 RX ORDER — ATORVASTATIN CALCIUM 80 MG/1
80 TABLET, FILM COATED ORAL NIGHTLY
Status: DISCONTINUED | OUTPATIENT
Start: 2024-04-01 | End: 2024-04-03 | Stop reason: HOSPADM

## 2024-04-01 RX ORDER — ONDANSETRON 2 MG/ML
4 INJECTION INTRAMUSCULAR; INTRAVENOUS EVERY 6 HOURS PRN
Status: DISCONTINUED | OUTPATIENT
Start: 2024-04-01 | End: 2024-04-03

## 2024-04-01 RX ORDER — CARVEDILOL 6.25 MG/1
6.25 TABLET ORAL 2 TIMES DAILY WITH MEALS
Status: DISCONTINUED | OUTPATIENT
Start: 2024-04-01 | End: 2024-04-03 | Stop reason: HOSPADM

## 2024-04-01 RX ADMIN — ATORVASTATIN CALCIUM 80 MG: 80 TABLET, FILM COATED ORAL at 21:12

## 2024-04-01 RX ADMIN — IOPAMIDOL 150 ML: 755 INJECTION, SOLUTION INTRAVENOUS at 17:29

## 2024-04-01 RX ADMIN — IOPAMIDOL 47 ML: 755 INJECTION, SOLUTION INTRAVENOUS at 12:50

## 2024-04-01 RX ADMIN — DOBUTAMINE HYDROCHLORIDE 5 MCG/KG/MIN: 200 INJECTION INTRAVENOUS at 10:30

## 2024-04-01 RX ADMIN — SODIUM CHLORIDE, PRESERVATIVE FREE 10 ML: 5 INJECTION INTRAVENOUS at 21:12

## 2024-04-01 RX ADMIN — CARVEDILOL 6.25 MG: 6.25 TABLET, FILM COATED ORAL at 17:05

## 2024-04-01 RX ADMIN — CLOPIDOGREL BISULFATE 75 MG: 75 TABLET ORAL at 17:05

## 2024-04-01 NOTE — OP NOTE
[facility] Operative Note     PROCEDURE SUMMARY   Procedure CORS ONLY   Indication AORTIC STENOSIS   Consent Obtained   Pre-Sedation Pre-sedation note completed.  Immediately prior to procedure, patient was reassessed and pre-sedation assessment and strategy remained unchanged.    Post-Sedation After procedure, I personally supervised the patient until awake, alert, following commands, breathing independently and hemodynamically stable.   Access RRA   US US guidance used to determine artery patency, size (>2mm), anatomic variations and ideal puncture location.  Real-time US utilized concurrent with vascular needle entry into artery.  Image(s) permanently recorded and reported in chart.   Bleed Risk Low   Sedation Minimal conscious sedation for comfort.  Independent trained observer pushed medications at my direction. Level of consciousness and vital signs/physiologic status monitored throughout the procedure (see start and stop times above, as well as medication dosages).   Start Time 1204   Stop Time 1238   Versed 3mg   Fentanyl 150mcg   Contrast 42cc   Flouro 10min   EBL <20mL   Complicat None   Specimens None   Diagnostic Detail Patient to cath lab in postabsorptive state, informed consent obtained.  RRA prepped and draped in normal sterile fashion  Micro needle used to access artery With US guidance  Micro wire advanced into artery, 5/6 Slendersheath advanced over wire   JL 3.5 advanced over wire to engage LM coronary artery and image in multiple views  JR4 advanced over wire to engage RCA and image in multiple views  Pigtail advancd over wire into LV and LVEDP obtained, pullback gradient obtained  Arterial hemostasis obtained with Radial band     FINDINGS   Artery Findings   LM Normal   LAD Prox to mid 70%   Cx OM1 50% prox   RCA Normal   LVEDP NA Normal 3-12mmHg   LVG N/A Normal >/= 55%   AVG N/A     INTERVENTION(S)   None    POST CATH  RECOMMENDATIONS   General Continued aggressive medical

## 2024-04-01 NOTE — PROGRESS NOTES
PRE-PROCEDURE    DATE: 4/1/2024 ARRIVAL TO CATH LAB: 11:19 AM    ADMIT SOURCE: Outpatient    ID & ALLERGY BAND: On    CONSENT: Yes    NPO SINCE: Midnight    LABS/PREGNANCY TEST: N/A    PULSES:  Right DP 2+  Right PT 1+  Left DP 2+  Left PT 1+    IV SITE : Patent in left arm.  with fluids infusing at kvo 11:19 AM     SURGERIES PLANNED: No    BLEEDING PROBLEMS: No    BLEEDING RISK: Low Risk <2%    COMPLIANCE: Yes      PATIENT HISTORY    CHIEF COMPLAINT: Shortness of Breath    EKG:  Yes    Pre CATH Rhythm: Normal Sinus Rhythm    STRESS TEST PREFORMED:  Yes FINDINGS:  Stress Nuclear: Date:  3/21/21  Result: Negative     EF: 60%    CARDIAC CTA PREFORMED:  No    [place] CORONARY CALCIUM SCORE:   Assessed: No    ECHO: DATE:  Yes.  Most recent date: 2/2/24      EF:  49    HYPERTENSION: Yes    DYSLIPIDEMIA: Yes    FAMILY HX OF CAD: No    PRIOR MI: No    PRIOR PCI: No    PRIOR CABG: No    CEREBRALVASCULAR DX: No    PERIPHERAL ARTERIAL DISEASE: No    CHRONIC LUNG DISEASE: No    TOBACCO: Never    DIABETIC: No    CARDIAC ARREST: No    DIALYSIS: No    FRAILTY SCORE: 4 VULNERABLE (while not dependent on others for IADLs, symptoms limit activities)

## 2024-04-01 NOTE — PROGRESS NOTES
Patient instructed on Dobutamine Stress Test Procedure including possible side effects/ adverse reactions. Patient verbalizes understanding and denies having any questions.

## 2024-04-01 NOTE — H&P
scribing for and in the presence of Thor Mccormick MD. 4/1/2024  Provider Attestation  Blanka Allison is working as a scribe for and in the presence of me (Thor Mccormick MD).  Working as a scribe, Blanka Cooper may have prepopulated components of this note with my historical  intellectual property under my direct supervision.  Any additions to this intellectual property were performed in my presence and at my direction.  Furthermore, the content and accuracy of this note have been reviewed by me (Thor Mccormick MD).  4/1/2024 12:19 PM   ASSESSMENT AND PLAN   *AS    Date EF Detail   Sx     As above   TTE 2/22 2/24  55%   50% AS MG 25, PV 3.33  AS MG 33, MG 3.86, mod MR, TR, severe LVH   STTE 4/24  Scheduled this morning   C   none   Plan     C R/B/O discussed   Appreciate referral from Dr Santana   *AF/Bradycardia  Status Plan   S/p leadless PPM  Per EP   *HTN  Status Advice Plan   Controlled Diet/salt/xcise/wt counseling Continue antihypertensives at current dosages   *CHOL  LDL Advice Plan   62, 3/24 Diet/salt/xcise/wt counseling Continue MT/HI statin

## 2024-04-01 NOTE — FLOWSHEET NOTE
Patient admitted to room 5911 from cath lab. Patient oriented to room, call light, bed rails, phone, lights and bathroom. Bed alarm in place, patient aware of placement and reason. Telemetry box [id number] in place, patient aware of placement and reason. Bed locked, in lowest position, side rails up 2/4, call light within reach. Family and patient educated on the restrictions following procedure today. Verbalized understanding.    04/01/24 1620   Vitals   Temp 98 °F (36.7 °C)   Temp Source Temporal   Pulse 64   Heart Rate Source Telemetry   Respirations 18   /72   MAP (Calculated) 94   MAP (mmHg) 90   BP Location Left upper arm   BP Method Automatic   Patient Position Semi fowlers;Turns self   Pain Assessment   Pain Assessment None - Denies Pain   Oxygen Therapy   SpO2 100 %   Pulse Oximetry Type Intermittent   Pulse Oximeter Device Mode Intermittent   Pulse Oximeter Device Location Left;Finger   O2 Device None (Room air)   RUE Neurovascular Assessment   Capillary Refill Less than/Equal to 3 seconds   Color Appropriate for Ethnicity   Temperature Cool   RUE Sensation  Full sensation;No numbness;No pain;No tingling   R Radial Pulse +3 (Strong)   Puncture Site Assessment 1   Location Radial - right   Site Assessment No redness, drainage, swelling or hematoma   Dressing Applied Transparent occlusive dressing   Multiple puncture sites No

## 2024-04-01 NOTE — PROGRESS NOTES
Report given to patients nurse. Pt transferred to room. Inpatient RN called and monitor is on and verified. Pt transferred in stable condition.

## 2024-04-02 LAB
ANION GAP SERPL CALCULATED.3IONS-SCNC: 10 MMOL/L (ref 3–16)
BUN SERPL-MCNC: 19 MG/DL (ref 7–20)
CALCIUM SERPL-MCNC: 9.4 MG/DL (ref 8.3–10.6)
CHLORIDE SERPL-SCNC: 105 MMOL/L (ref 99–110)
CO2 SERPL-SCNC: 25 MMOL/L (ref 21–32)
CREAT SERPL-MCNC: 0.6 MG/DL (ref 0.8–1.3)
DEPRECATED RDW RBC AUTO: 17.4 % (ref 12.4–15.4)
GFR SERPLBLD CREATININE-BSD FMLA CKD-EPI: >90 ML/MIN/{1.73_M2}
GLUCOSE SERPL-MCNC: 98 MG/DL (ref 70–99)
HCT VFR BLD AUTO: 35.8 % (ref 40.5–52.5)
HGB BLD-MCNC: 11.9 G/DL (ref 13.5–17.5)
INR PPP: 1.2 (ref 0.84–1.16)
MCH RBC QN AUTO: 26.4 PG (ref 26–34)
MCHC RBC AUTO-ENTMCNC: 33.2 G/DL (ref 31–36)
MCV RBC AUTO: 79.5 FL (ref 80–100)
PLATELET # BLD AUTO: 163 K/UL (ref 135–450)
PMV BLD AUTO: 8 FL (ref 5–10.5)
POTASSIUM SERPL-SCNC: 3.9 MMOL/L (ref 3.5–5.1)
PROTHROMBIN TIME: 15.2 SEC (ref 11.5–14.8)
RBC # BLD AUTO: 4.5 M/UL (ref 4.2–5.9)
SODIUM SERPL-SCNC: 140 MMOL/L (ref 136–145)
WBC # BLD AUTO: 5.9 K/UL (ref 4–11)

## 2024-04-02 PROCEDURE — 2580000003 HC RX 258: Performed by: INTERNAL MEDICINE

## 2024-04-02 PROCEDURE — 93880 EXTRACRANIAL BILAT STUDY: CPT

## 2024-04-02 PROCEDURE — 2060000000 HC ICU INTERMEDIATE R&B

## 2024-04-02 PROCEDURE — 85610 PROTHROMBIN TIME: CPT

## 2024-04-02 PROCEDURE — 99232 SBSQ HOSP IP/OBS MODERATE 35: CPT | Performed by: INTERNAL MEDICINE

## 2024-04-02 PROCEDURE — 6370000000 HC RX 637 (ALT 250 FOR IP): Performed by: INTERNAL MEDICINE

## 2024-04-02 PROCEDURE — 80048 BASIC METABOLIC PNL TOTAL CA: CPT

## 2024-04-02 PROCEDURE — 85027 COMPLETE CBC AUTOMATED: CPT

## 2024-04-02 PROCEDURE — 36415 COLL VENOUS BLD VENIPUNCTURE: CPT

## 2024-04-02 RX ADMIN — ATORVASTATIN CALCIUM 80 MG: 80 TABLET, FILM COATED ORAL at 21:27

## 2024-04-02 RX ADMIN — SODIUM CHLORIDE, PRESERVATIVE FREE 10 ML: 5 INJECTION INTRAVENOUS at 09:05

## 2024-04-02 RX ADMIN — CARVEDILOL 6.25 MG: 6.25 TABLET, FILM COATED ORAL at 09:05

## 2024-04-02 RX ADMIN — CARVEDILOL 6.25 MG: 6.25 TABLET, FILM COATED ORAL at 16:11

## 2024-04-02 RX ADMIN — SODIUM CHLORIDE, PRESERVATIVE FREE 10 ML: 5 INJECTION INTRAVENOUS at 21:27

## 2024-04-02 RX ADMIN — CLOPIDOGREL BISULFATE 75 MG: 75 TABLET ORAL at 09:05

## 2024-04-02 NOTE — PROGRESS NOTES
[facility]  H+P  Consult  OP Visit  FU Visit   CC/INTERVAL HX   Admit 4/1/2024   CC AS, AF, CAD, HTN, CHOL   Subjective Doing well.  No cp, sob.  Favorable CTA for TAVR.  Plan for PCI of LAD tomorrow.     Tele Reviewed available   EXAM   VS /76   Pulse 75   Temp 97.2 °F (36.2 °C) (Temporal)   Resp 16   Ht 1.753 m (5' 9\")   Wt 101.1 kg (222 lb 14.4 oz)   SpO2 97%   BMI 32.92 kg/m²    Gen Alert, coop, no distress Heart  RRR, no MRG   Head NC, AT, no abnorm Abd  Soft, NT, +BS, no mass, no OM   Eyes PER, conj/corn clear Ext  Ext nl, AT, no C/C/E   Nose Nares nl, no drain, NT Pulse 2+ and symmetric   Throat Lips, mucosa, tongue nl Skin Col/text/turg nl, no vis rash/les   Neck S/S, TM, NT, no bruit/JVD Psych Nl mood and affect   Lung CTA-B, unlabored, no DTP Lymph   No cervical or axillary LA   Ch wall NT, no deform Neuro  Nl gross M/S exam      MEDICATIONS   Relevant cardiac medications Reviewed in [facility]   SCRIBE   RN - NA   MD - NA   LABS   Hgb Lab Results   Component Value Date    HGB 11.9 (L) 04/02/2024      Cr Lab Results   Component Value Date    CREATININE 0.6 (L) 04/02/2024      Trop No results found for: \"CKTOTAL\", \"CKMB\", \"CKMBINDEX\", \"TROPHS\"    ASSESSMENT TIME   More than 35 minutes spent reviewing patient chart (including but not limited to notes, labs, imaging and other testing), interviewing patient and/or family members, performing a physical exam, documentation of my findings above and subsequent follow-up of ordered testing.  More than 50% of that time spent face to face with patient discussing clinical condition and counseling regarding treatment plan.     ASSESSMENT AND PLAN   *AS    Date EF Detail   Sx     As above   TTE 2/22 2/24  55%   50% AS MG 25, PV 3.33  AS MG 33, MG 3.86, mod MR, TR, severe LVH   STTE 4/24  Scheduled this morning   [facility]   none   Plan     PCI of LAD on Wednesday - family very concerned about blockage  Discharge after PCI   Outpatient dental

## 2024-04-02 NOTE — CARE COORDINATION
Discharge Planning Note:  Chart reviewed  for discharge needs and it appears that patient has minimal needs for discharge at this time. Patient for TAVR workup   Risk Score 10 %     Primary Care Physician is Camilla Rios DO    Primary insurance is [employer]    Please notify case management if any discharge needs are identified.      Case management will continue to follow progress and update discharge plan as needed.

## 2024-04-02 NOTE — CONSULTS
dissection or significant occlusive disease.  8. 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    Carotid duplex: 4/2/24  -The right internal carotid artery reveals a <50% diameter reducing   stenosis.   -The left internal carotid artery appears to have a <50% diameter reducing stenosis based on velocity criteria, however may be underestimated due to calcific shadowing and low velocities secondary to aortic stenosis when considering the left ICA/CCA ratio of 2.58.    Labs:   CBC:   Recent Labs     04/01/24  1120 04/01/24 1838 04/02/24  0458   WBC 5.3 4.5 5.9   HGB 12.5* 12.4* 11.9*   HCT 37.5* 37.0* 35.8*   MCV 80.1 81.0 79.5*    171 163     BMP:   Recent Labs     04/01/24 1838 04/02/24  0458    140   K 4.5 3.9    105   CO2 28 25   BUN 21* 19   CREATININE 0.6* 0.6*   CALCIUM 9.6 9.4     Cardiac Enzymes: No results for input(s): \"CKTOTAL\", \"CKMB\", \"CKMBINDEX\", \"TROPONINI\" in the last 72 hours.  PT/INR: No results for input(s): \"PROTIME\", \"INR\" in the last 72 hours.  APTT: No results for input(s): \"APTT\" in the last 72 hours.  Liver Profile:  Lab Results   Component Value Date/Time    AST 27 03/19/2024 10:36 AM    ALT 28 03/19/2024 10:36 AM    BILIDIR 0.4 03/19/2024 10:36 AM    BILITOT 1.5 03/19/2024 10:36 AM    ALKPHOS 334 03/19/2024 10:36 AM    LABALBU 4.4 03/29/2024 09:49 AM     Lab Results   Component Value Date/Time    CHOL 105 09/02/2022 02:37 PM    HDL 56 03/19/2024 10:36 AM    TRIG 44 09/02/2022 02:37 PM     HgbA1c:  Lab Results   Component Value Date/Time    LABA1C 5.7 03/05/2024 09:21 AM     UA:   Lab Results   Component Value Date/Time    COLORU Yellow 10/04/2021 05:21 PM    PHUR 6.5 10/04/2021 05:21 PM    WBCUA 0-2 08/25/2015 09:45 AM    RBCUA 0-2 08/25/2015 09:45 AM    MUCUS Rare 08/25/2015 09:45 AM    BACTERIA Rare 08/25/2015 09:45 AM    CLARITYU Clear 10/04/2021 05:21 PM    SPECGRAV 1.010 10/04/2021 05:21 PM    LEUKOCYTESUR Negative 10/04/2021 05:21 PM    UROBILINOGEN 0.2 10/04/2021

## 2024-04-03 VITALS
SYSTOLIC BLOOD PRESSURE: 135 MMHG | WEIGHT: 221.5 LBS | BODY MASS INDEX: 32.81 KG/M2 | OXYGEN SATURATION: 97 % | DIASTOLIC BLOOD PRESSURE: 73 MMHG | HEIGHT: 69 IN | RESPIRATION RATE: 15 BRPM | HEART RATE: 73 BPM | TEMPERATURE: 98 F

## 2024-04-03 DIAGNOSIS — I25.83 CORONARY ARTERY DISEASE DUE TO LIPID RICH PLAQUE: Primary | ICD-10-CM

## 2024-04-03 DIAGNOSIS — I25.10 CORONARY ARTERY DISEASE DUE TO LIPID RICH PLAQUE: Primary | ICD-10-CM

## 2024-04-03 LAB
ABO + RH BLD: NORMAL
ANION GAP SERPL CALCULATED.3IONS-SCNC: 9 MMOL/L (ref 3–16)
ANTIBODY SCREEN: NORMAL
BUN SERPL-MCNC: 16 MG/DL (ref 7–20)
CALCIUM SERPL-MCNC: 9.4 MG/DL (ref 8.3–10.6)
CHLORIDE SERPL-SCNC: 103 MMOL/L (ref 99–110)
CO2 SERPL-SCNC: 25 MMOL/L (ref 21–32)
CREAT SERPL-MCNC: <0.5 MG/DL (ref 0.8–1.3)
DEPRECATED RDW RBC AUTO: 17.5 % (ref 12.4–15.4)
EKG ATRIAL RATE: 375 BPM
EKG DIAGNOSIS: NORMAL
EKG Q-T INTERVAL: 504 MS
EKG QRS DURATION: 164 MS
EKG QTC CALCULATION (BAZETT): 504 MS
EKG R AXIS: 120 DEGREES
EKG T AXIS: 75 DEGREES
EKG VENTRICULAR RATE: 60 BPM
GFR SERPLBLD CREATININE-BSD FMLA CKD-EPI: >90 ML/MIN/{1.73_M2}
GLUCOSE SERPL-MCNC: 90 MG/DL (ref 70–99)
HCT VFR BLD AUTO: 34.3 % (ref 40.5–52.5)
HGB BLD-MCNC: 11.7 G/DL (ref 13.5–17.5)
MCH RBC QN AUTO: 27.4 PG (ref 26–34)
MCHC RBC AUTO-ENTMCNC: 34.2 G/DL (ref 31–36)
MCV RBC AUTO: 80 FL (ref 80–100)
PLATELET # BLD AUTO: 161 K/UL (ref 135–450)
PMV BLD AUTO: 7.6 FL (ref 5–10.5)
POC ACT LR: >400 SEC
POTASSIUM SERPL-SCNC: 4 MMOL/L (ref 3.5–5.1)
RBC # BLD AUTO: 4.28 M/UL (ref 4.2–5.9)
SODIUM SERPL-SCNC: 137 MMOL/L (ref 136–145)
WBC # BLD AUTO: 4.8 K/UL (ref 4–11)

## 2024-04-03 PROCEDURE — 99239 HOSP IP/OBS DSCHRG MGMT >30: CPT | Performed by: INTERNAL MEDICINE

## 2024-04-03 PROCEDURE — 2500000003 HC RX 250 WO HCPCS

## 2024-04-03 PROCEDURE — 6370000000 HC RX 637 (ALT 250 FOR IP)

## 2024-04-03 PROCEDURE — 85027 COMPLETE CBC AUTOMATED: CPT

## 2024-04-03 PROCEDURE — 6370000000 HC RX 637 (ALT 250 FOR IP): Performed by: INTERNAL MEDICINE

## 2024-04-03 PROCEDURE — 99152 MOD SED SAME PHYS/QHP 5/>YRS: CPT

## 2024-04-03 PROCEDURE — 6360000002 HC RX W HCPCS

## 2024-04-03 PROCEDURE — C9600 PERC DRUG-EL COR STENT SING: HCPCS

## 2024-04-03 PROCEDURE — C1887 CATHETER, GUIDING: HCPCS

## 2024-04-03 PROCEDURE — 6360000004 HC RX CONTRAST MEDICATION: Performed by: INTERNAL MEDICINE

## 2024-04-03 PROCEDURE — 92928 PRQ TCAT PLMT NTRAC ST 1 LES: CPT | Performed by: INTERNAL MEDICINE

## 2024-04-03 PROCEDURE — 93010 ELECTROCARDIOGRAM REPORT: CPT | Performed by: STUDENT IN AN ORGANIZED HEALTH CARE EDUCATION/TRAINING PROGRAM

## 2024-04-03 PROCEDURE — C1725 CATH, TRANSLUMIN NON-LASER: HCPCS

## 2024-04-03 PROCEDURE — 36415 COLL VENOUS BLD VENIPUNCTURE: CPT

## 2024-04-03 PROCEDURE — 99152 MOD SED SAME PHYS/QHP 5/>YRS: CPT | Performed by: INTERNAL MEDICINE

## 2024-04-03 PROCEDURE — 80048 BASIC METABOLIC PNL TOTAL CA: CPT

## 2024-04-03 PROCEDURE — 93005 ELECTROCARDIOGRAM TRACING: CPT | Performed by: INTERNAL MEDICINE

## 2024-04-03 PROCEDURE — 86901 BLOOD TYPING SEROLOGIC RH(D): CPT

## 2024-04-03 PROCEDURE — 86900 BLOOD TYPING SEROLOGIC ABO: CPT

## 2024-04-03 PROCEDURE — 85347 COAGULATION TIME ACTIVATED: CPT

## 2024-04-03 PROCEDURE — C1894 INTRO/SHEATH, NON-LASER: HCPCS

## 2024-04-03 PROCEDURE — 2580000003 HC RX 258: Performed by: INTERNAL MEDICINE

## 2024-04-03 PROCEDURE — 86850 RBC ANTIBODY SCREEN: CPT

## 2024-04-03 PROCEDURE — C1769 GUIDE WIRE: HCPCS

## 2024-04-03 PROCEDURE — C1874 STENT, COATED/COV W/DEL SYS: HCPCS

## 2024-04-03 PROCEDURE — 99153 MOD SED SAME PHYS/QHP EA: CPT

## 2024-04-03 RX ORDER — SODIUM CHLORIDE 0.9 % (FLUSH) 0.9 %
5-40 SYRINGE (ML) INJECTION PRN
Status: DISCONTINUED | OUTPATIENT
Start: 2024-04-03 | End: 2024-04-03 | Stop reason: HOSPADM

## 2024-04-03 RX ORDER — SODIUM CHLORIDE 0.9 % (FLUSH) 0.9 %
5-40 SYRINGE (ML) INJECTION EVERY 12 HOURS SCHEDULED
Status: DISCONTINUED | OUTPATIENT
Start: 2024-04-03 | End: 2024-04-03

## 2024-04-03 RX ORDER — SODIUM CHLORIDE 9 MG/ML
INJECTION, SOLUTION INTRAVENOUS PRN
Status: DISCONTINUED | OUTPATIENT
Start: 2024-04-03 | End: 2024-04-03 | Stop reason: HOSPADM

## 2024-04-03 RX ORDER — CLOPIDOGREL BISULFATE 75 MG/1
75 TABLET ORAL DAILY
Qty: 30 TABLET | Refills: 6 | Status: SHIPPED | OUTPATIENT
Start: 2024-04-04

## 2024-04-03 RX ORDER — ACETAMINOPHEN 325 MG/1
650 TABLET ORAL EVERY 4 HOURS PRN
Status: DISCONTINUED | OUTPATIENT
Start: 2024-04-03 | End: 2024-04-03 | Stop reason: HOSPADM

## 2024-04-03 RX ORDER — ONDANSETRON 2 MG/ML
4 INJECTION INTRAMUSCULAR; INTRAVENOUS EVERY 6 HOURS PRN
Status: DISCONTINUED | OUTPATIENT
Start: 2024-04-03 | End: 2024-04-03 | Stop reason: HOSPADM

## 2024-04-03 RX ADMIN — CARVEDILOL 6.25 MG: 6.25 TABLET, FILM COATED ORAL at 09:28

## 2024-04-03 RX ADMIN — SODIUM CHLORIDE, PRESERVATIVE FREE 10 ML: 5 INJECTION INTRAVENOUS at 09:28

## 2024-04-03 RX ADMIN — CLOPIDOGREL BISULFATE 75 MG: 75 TABLET ORAL at 07:58

## 2024-04-03 RX ADMIN — IOPAMIDOL 58 ML: 755 INJECTION, SOLUTION INTRAVENOUS at 09:10

## 2024-04-03 NOTE — PRE SEDATION
[facility]  Pre-sedation Assessment   PROCEDURE   Planned Procedure Cardiac catheterization   Consent I have discussed with the patient and/or the patient representative the indication, alternatives, and the possible risks and/or complications of the planned procedure and the anesthesia methods. The patient and/or patient representative appear to understand and agree to proceed.   INDICATION   Chief Complaint Dyspnea on Exertion  Dyspnea  Fatigue   Presentation Suspected CAD and Valvular Disease - Aortic Stenosis; Stenosis Severity: moderate-severe, Mitral Regurgitation; Regurgitation Severity: Moderate (2+), and Tricuspid Regurgitation; Regurgitation Severity: Moderate (2+)   Anginal Class (2 wks) CCS III - Symptoms with everyday living activities, i.e., moderate limitation   Anginal Symptoms Typical chest pain or anginal equivalent   CHF Class (2 wks) Yes:  Heart Failure Type: Systolic Severity:  Class III - Symptoms of HF on less-than-ordinary exertion   CV Instability Yes   ISCHEMIC WORKUP/MANAGEMENT/EVALUTION   Stress Test No   Anginal Meds Yes: ACE/ARB/ARNI, Beta Blockers, and STATIN   UPCOMING SURGERY   Valve surgery Yes:  Aortic Stenosis; Stenosis Severity: moderate-severe, Mitral Regurgitation; Regurgitation Severity: Moderate (2+), and Tricuspid Regurgitation; Regurgitation Severity: Moderate (2+) N/A N/A   MEDICAL HISTORY   Vital Signs There were no vitals taken for this visit.   Allergies Reviewed in EMR   Medications Reviewed in EMR   Medical History Reviewed in EMR   Surgical History Reviewed in EMR   PRE-SEDATION   Exam I have personally completed a history, physical exam & review of systems for this patient (see notes).   Hx Anesthesia Issue None   Mod Mallampati II   ASA Class Class 2 - A normal healthy patient with mild systemic disease   BARBARA SCALE   Activity 2 - Able to move 4 extrem on command   Respiration 2 - Able to breath deeply and cough freely   Circulation 2 - BP +/- 
  SEDATION/ANESTHESIA PLAN   Goal Guard patient safety and welfare. Minimize physical discomfort and pain. Minimize negative psychological responses to treatment by providing sedation and analgesia and maximize the potential amnesia.  Patient to meet pre-procedure discharge plan.   Medications Midazolam intravenously and fentanyl intravenously   Appropriate Candidate Yes   Post Procedure Return to same level of care

## 2024-04-03 NOTE — DISCHARGE INSTRUCTIONS
Coronary Angiogram: About This Test  What is a coronary angiogram?     A coronary angiogram is a test to look at the large blood vessels of your heart (coronary arteries). These blood vessels feed blood, oxygen, and nutrients to your heart.  Why is this test done?  This test is done to check blood flow in your coronary arteries. It can show the size and location of narrowed or blocked sections of an artery.  It's done for people who have coronary artery disease, also known as heart disease. The test can show how serious the disease is and how best to treat it. Or it can be done for people who have symptoms of heart disease to find out if there is a problem with the artery.  The Doctor can look at the shape of your heart, the motion of the heart, and valves in the heart.   What happens during the test?  You will get medicine to help you relax.  A thin tube called a catheter is put into a blood vessel in your groin or arm.  You will get a shot to numb the skin where the catheter goes in. You may feel pressure when the doctor moves the catheter through your blood vessel into your heart.  Dye is put into your coronary arteries through the catheter. Your doctor can see the dye as it moves through the arteries. This lets your doctor look for areas that are narrowed or blocked.  You may feel hot or flushed for several seconds when the dye is put in.  How long does it take?  The test will take about 30 minutes to an hour. But you need time to get ready for it and time to recover. If a problem is found and the doctor treats it, it can take a few hours longer.     Care of your puncture site:  Remove bandage 24 hours after the procedure.  May shower in 24 hours but do not sit in a bathtub/pool of water for 5 days or until the wound is healed.  Inspect the site daily and gently clean using soap and water while standing in the shower.  Dry thoroughly and apply a Band-Aid that covers the entire site. Do not apply powder or

## 2024-04-03 NOTE — PROGRESS NOTES
Spoke to pt and his wife re TAVR timing. Will plan on TAVR early May to allow time to heal from PCI. Aware of needing dental clearance. Let pt know that I will contact them with TAVR instructions. VU of all. Ml GRAHAM

## 2024-04-03 NOTE — OP NOTE
[facility] Operative Note     PROCEDURE SUMMARY   Procedure PCI LAD   Indication UA, AORTIC STENOSIS   Consent Obtained   Pre-Sedation Pre-sedation note completed.  Immediately prior to procedure, patient was reassessed and pre-sedation assessment and strategy remained unchanged.    Post-Sedation After procedure, I personally supervised the patient until awake, alert, following commands, breathing independently and hemodynamically stable.   Access RRA   US US guidance used to determine artery patency, size (>2mm), anatomic variations and ideal puncture location.  Real-time US utilized concurrent with vascular needle entry into artery.  Image(s) permanently recorded and reported in chart.   Bleed Risk Low   Sedation Minimal conscious sedation for comfort.  Independent trained observer pushed medications at my direction. Level of consciousness and vital signs/physiologic status monitored throughout the procedure (see start and stop times above, as well as medication dosages).   Start Time 0815   Stop Time 0903   Versed 35mg   Fentanyl 75mcg   Contrast 58cc   Flouro 8.1min   EBL <20mL   Complicat None   Specimens None   Diagnostic Detail Patient to cath lab in postabsorptive state, informed consent obtained.  RRA prepped and draped in normal sterile fashion  Micro needle used to access artery With US guidance  Micro wire advanced into artery, 5/6 Slendersheath advanced over wire   JL 3.5 advanced over wire to engage LM coronary artery and image in multiple views  JR4 advanced over wire to engage RCA and image in multiple views  Pigtail advancd over wire into LV and LVEDP obtained, pullback gradient obtained  Arterial hemostasis obtained with Radial band   Intervention  Detail AP/AC regimen was Aspirin (ASA), Clopidogrel (Plavix) and Heparin  Guide catheter advanced over wire into LMCA  Coronary Guidewire advanced to distal artery without difficulty  Lesion pre-dilated with 2.75mmballoon  Lesion stented  Declined

## 2024-04-03 NOTE — PLAN OF CARE
Problem: Chronic Conditions and Co-morbidities  Goal: Patient's chronic conditions and co-morbidity symptoms are monitored and maintained or improved  4/2/2024 2307 by Kaur Jackson RN  Outcome: Progressing  4/2/2024 1803 by Jonny Pham RN  Outcome: Progressing  4/2/2024 1448 by Jonny Pham RN  Outcome: Progressing     Problem: Discharge Planning  Goal: Discharge to home or other facility with appropriate resources  4/2/2024 2307 by Kaur Jackson RN  Outcome: Progressing  4/2/2024 1803 by Jonny Pham RN  Outcome: Progressing  4/2/2024 1448 by Jonny Pham RN  Outcome: Progressing     Problem: Respiratory - Adult  Goal: Achieves optimal ventilation and oxygenation  4/2/2024 2307 by Kaur Jackson RN  Outcome: Progressing  4/2/2024 1803 by Jonny Pham RN  Outcome: Progressing  4/2/2024 1448 by Jonny Pham RN  Outcome: Progressing     Problem: Cardiovascular - Adult  Goal: Maintains optimal cardiac output and hemodynamic stability  4/2/2024 2307 by Kaur Jackson RN  Outcome: Progressing  4/2/2024 1803 by Jonny Pham RN  Outcome: Progressing  4/2/2024 1448 by Jonny Pham RN  Outcome: Progressing  Goal: Absence of cardiac dysrhythmias or at baseline  4/2/2024 2307 by Kaur Jackson RN  Outcome: Progressing  4/2/2024 1803 by Jonny Pham RN  Outcome: Progressing  4/2/2024 1448 by Jonny Pham RN  Outcome: Progressing     Problem: Skin/Tissue Integrity - Adult  Goal: Skin integrity remains intact  4/2/2024 2307 by Kaur Jackson RN  Outcome: Progressing  4/2/2024 1803 by Jonny Pham RN  Outcome: Progressing  4/2/2024 1448 by Jonny Pham RN  Outcome: Progressing     Problem: Musculoskeletal - Adult  Goal: Return mobility to safest level of function  4/2/2024 2307 by Kaur Jackson RN  Outcome: Progressing  4/2/2024 1803 by Jonny Pham RN  Outcome: Progressing  4/2/2024 1448 by Jonny Pham, SANTOS  Outcome: 
  Problem: Chronic Conditions and Co-morbidities  Goal: Patient's chronic conditions and co-morbidity symptoms are monitored and maintained or improved  Outcome: Progressing     Problem: Discharge Planning  Goal: Discharge to home or other facility with appropriate resources  Outcome: Progressing     Problem: Respiratory - Adult  Goal: Achieves optimal ventilation and oxygenation  Outcome: Progressing     Problem: Cardiovascular - Adult  Goal: Maintains optimal cardiac output and hemodynamic stability  Outcome: Progressing  Goal: Absence of cardiac dysrhythmias or at baseline  Outcome: Progressing     Problem: Skin/Tissue Integrity - Adult  Goal: Skin integrity remains intact  Outcome: Progressing     Problem: Musculoskeletal - Adult  Goal: Return mobility to safest level of function  Outcome: Progressing     Problem: Gastrointestinal - Adult  Goal: Minimal or absence of nausea and vomiting  Outcome: Progressing     Problem: Genitourinary - Adult  Goal: Absence of urinary retention  Outcome: Progressing     Problem: Metabolic/Fluid and Electrolytes - Adult  Goal: Electrolytes maintained within normal limits  Outcome: Progressing  Goal: Hemodynamic stability and optimal renal function maintained  Outcome: Progressing     Problem: Hematologic - Adult  Goal: Maintains hematologic stability  Outcome: Progressing     
SANTOS Fuller  Outcome: Progressing

## 2024-04-03 NOTE — PROGRESS NOTES
PATIENT HISTORY    ECHO: DATE: 2/224        EF49:  STRESS TEST PREFORMED:  No  EKG: Yes    ECG     Result: Normal  Pre CATH Rhythm:  Vpaced  HYPERTENSION: Yes  DYSLIPIDEMIA: Yes  FAMILY HX OF CAD: No  PRIOR MI: No  PRIOR PCI: No  PRIOR CABG: No  CEREBROVASCULAR DX: No  PERIPHERAL ARTERIAL DISEASE: No  CHRONIC LUNG DISEASE: No  TOBACCO: Never  DIABETIC: No  CARDIAC ARREST: {No  DIALYSIS: No  HEART FAILURE: No  FRAILTY SCORE: 3 MANAGING WELL (medical problems are well controlled, not regularly active beyond routine walking)  CARDIAC CTA PREFORMED:  No  [place] CORONARY CALCIUM SCORE:   Assessed: No  Prior Diagnostic Coronary Angioplasty Procedure:  Yes    Most Recent Date: 4/1/24    Results: Obstructive

## 2024-04-04 ENCOUNTER — TELEPHONE (OUTPATIENT)
Dept: FAMILY MEDICINE CLINIC | Age: 84
End: 2024-04-04

## 2024-04-04 ENCOUNTER — CARE COORDINATION (OUTPATIENT)
Dept: CASE MANAGEMENT | Age: 84
End: 2024-04-04

## 2024-04-04 DIAGNOSIS — I35.0 AORTIC STENOSIS, SEVERE: Primary | ICD-10-CM

## 2024-04-04 PROCEDURE — 1111F DSCHRG MED/CURRENT MED MERGE: CPT | Performed by: FAMILY MEDICINE

## 2024-04-04 NOTE — TELEPHONE ENCOUNTER
Care Transitions Initial Follow Up Call    Outreach made within 2 business days of discharge: Yes    Patient: Anup Del Cid Patient : [id number]   MRN: [id number]  Reason for Admission: There are no discharge diagnoses documented for the most recent discharge.  Discharge Date: 4/3/24       Spoke with: Anup, Patient will be following up with his cardiologist, and will be having surgery in may.    Discharge department/facility: [facility] Interactive Patient Contact:  Was patient able to fill all prescriptions: Yes  Was patient instructed to bring all medications to the follow-up visit: Yes  Is patient taking all medications as directed in the discharge summary? Yes  Does patient understand their discharge instructions: Yes  Does patient have questions or concerns that need addressed prior to 7-14 day follow up office visit: no    Scheduled appointment with PCP within 7-14 days    Follow Up  Future Appointments   Date Time Provider Department Center   2024  7:30 AM SCHEDULE, [facility] REMOTE TRANSMISSION [facility]   2024 11:00 AM Ramirez Santana MD [facility]   2024  9:30 AM Camilla Rios DO [facility]  [place] [facility]   2024 10:30 AM SCHEDULE, [facility] DEVICE CHECK [facility]   2024 10:30 AM Leann Edouard, APRN - CNP [facility]       Kristy Yancey LPN

## 2024-04-04 NOTE — CARE COORDINATION
Care Transitions Initial Follow Up Call    Call within 2 business days of discharge: Yes    Patient Current Location:  Home: [address] [place] [id number]    Care Transition Nurse contacted the patient by telephone to perform post hospital discharge assessment. Verified name and  with patient as identifiers. Provided introduction to self, and explanation of the Care Transition Nurse role.     Patient: Anup Del Cid Patient : 1940   MRN: [id number]  Reason for Admission:   Discharge Date: 4/3/24 RARS: Readmission Risk Score: 9.2      Last Discharge Facility       Date Complaint Diagnosis Description Type Department Provider    24   Admission (Discharged) [facility] 5C Thor Mccormick MD            Was this an external facility discharge? No Discharge Facility:     Challenges to be reviewed by the provider   Additional needs identified to be addressed with provider: No  none               Method of communication with provider: none.    Pt states doing well, no issues or concerns. Incision is CDI, denies CP. Agreed to more CTC f/u calls.      Care Transition Nurse reviewed discharge instructions with patient who verbalized understanding. The patient was given an opportunity to ask questions and does not have any further questions or concerns at this time. Were discharge instructions available to patient? Yes. Reviewed appropriate site of care based on symptoms and resources available to patient including: When to call 911. The patient agrees to contact the PCP office for questions related to their healthcare.     Advance Care Planning:   Does patient have an Advance Directive: not on file.    Medication reconciliation was performed with patient, who verbalizes understanding of administration of home medications. Medications reviewed, 1111F entered: yes    Was patient discharged with a pulse oximeter? no    Non-face-to-face services provided:  Obtained and reviewed discharge summary and/or

## 2024-04-05 NOTE — DISCHARGE SUMMARY
[facility]   DISCHARGE SUMMARY    Patient ID Anup Del Cid           [id number]           83 y.o.          1940   Admit Date 4/1/2024    Discharge Date 4/3/2024   Admit MD Thor Mccormick MD   Admit Dx Patient Active Problem List   Diagnosis    Essential hypertension    Mixed hyperlipidemia    Morbidly obese (HCC)    Erectile dysfunction    Pre-diabetes    Chronic gout without tophus    Nonrheumatic aortic valve stenosis    Abnormal EKG    SOB (shortness of breath)    Atrial fibrillation (HCC)    Hypercholesteremia    Primary osteoarthritis of left hip    Type 2 diabetes mellitus without complication, without long-term current use of insulin (HCC)    Bradycardia    Pacemaker    AV block    Secondary hypercoagulable state (HCC)    Aortic stenosis, severe      Discharge Condition Good   Consults IP CONSULT TO CARDIOTHORACIC SURGERY  IP CONSULT TO CARDIAC REHAB  IP CONSULT TO CARDIAC REHAB   Hospital Course Anup Del Cid admitted with abnormal LHC and dobutamine echo. CTA of C/A/P performed for TAVR.  PCI of LAD performed for obstructive disease.  Plan for outpatient dental evaluation and schedule for TAVR.    Subjective Patient was seen and examined. No acute issues overnight and patient without concerns.  Patient appropriate for discharge.     Exam Gen Alert, coop, no distress Heart  RRR, no MRG, nl apical impulse   Head NC, AT, no abnorm Abd  Soft, NT, +BS, no mass, no OM   Eyes PERRLA, conj/corn clear Ext  Ext nl, AT, no C/C/E, groin c/d/i   Nose Nares nl, no drain, NT Pulse 2+ and symmetric   Throat Lips, mucosa, tongue nl Skin Color/text/turg nl, no rash/lesions   Neck S/S, TM, NT, no bruit/JVD Psych Nl mood and affect   Lung CTA-B, unlabored, no DTP Lymph   No cervical or axillary LA   Ch wall NT, no deform Neuro  Nl gross M/S exam      Studies/Labs Reviewed, please see Epic EMR for specific details   Disposition Home   Discharge Meds    Medication List        START taking these

## 2024-04-08 LAB
EKG ATRIAL RATE: 60 BPM
EKG DIAGNOSIS: NORMAL
EKG Q-T INTERVAL: 472 MS
EKG QRS DURATION: 160 MS
EKG QTC CALCULATION (BAZETT): 472 MS
EKG R AXIS: 104 DEGREES
EKG T AXIS: 11 DEGREES
EKG VENTRICULAR RATE: 60 BPM

## 2024-04-09 ENCOUNTER — TELEPHONE (OUTPATIENT)
Dept: CARDIAC CATH/INVASIVE PROCEDURES | Age: 84
End: 2024-04-09

## 2024-04-09 NOTE — TELEPHONE ENCOUNTER
Pt/wife aware of TAVR on 5/7/24. Aware that I will call the week of 4/22/24 to remind him to get bloodwork/UA at [facility] the week of 4/29/24. Ml GRAHAM

## 2024-04-11 ENCOUNTER — CARE COORDINATION (OUTPATIENT)
Dept: CASE MANAGEMENT | Age: 84
End: 2024-04-11

## 2024-04-11 NOTE — CARE COORDINATION
Care Transitions Follow Up Call    Patient Current Location:  Home: [address] [id number]    Care Transition Nurse contacted the spouse/partner by telephone to follow up after admission.  Verified name and  with spouse/partner as identifiers.    Patient: Anup Del Cid  Patient : 1940   MRN: [id number]  Reason for Admission:   Discharge Date: 4/3/24 RARS: Readmission Risk Score: 9.2      Needs to be reviewed by the provider   Additional needs identified to be addressed with provider: No  none             Method of communication with provider: none.    Pt's wife,HIPAA verified, states pt is doing well, no issues or concerns. Scheduled for TAVR next month. She declined any further CTC f/u calls.      Addressed changes since last contact:  none    Follow Up  Future Appointments   Date Time Provider Department Center   2024  7:30 AM SCHEDULE, [facility] REMOTE TRANSMISSION [facility]   2024 11:00 AM Ramirez Santana MD [facility]   2024  9:30 AM Camilla Rios DO [facility]  [place] [facility]   2024 10:30 AM SCHEDULE, [facility] DEVICE CHECK [facility]   2024 10:30 AM Leann Edouard, APRN - CNP [facility]       Care Transition Nurse reviewed medical action plan with spouse/partner and discussed any barriers to care and/or understanding of plan of care after discharge. Discussed appropriate site of care based on symptoms and resources available to patient including: When to call 911. The spouse/partner agrees to contact the PCP office for questions related to their healthcare.     Advance Care Planning:   not on file; education provided.     Offered patient enrollment in the Remote Patient Monitoring (RPM) program for in-home monitoring:  wife did not allow time to discuss .     Care Transitions Subsequent and Final Call    Subsequent and Final Calls  Have your medications changed?: No  Do you have any questions related to your medications?: No  Do you

## 2024-04-22 ENCOUNTER — TELEPHONE (OUTPATIENT)
Dept: CARDIOLOGY CLINIC | Age: 84
End: 2024-04-22

## 2024-04-22 DIAGNOSIS — I35.0 NONRHEUMATIC AORTIC VALVE STENOSIS: Primary | ICD-10-CM

## 2024-04-22 DIAGNOSIS — N42.9 DISORDER OF PROSTATE, UNSPECIFIED: ICD-10-CM

## 2024-04-22 DIAGNOSIS — E11.9 TYPE 2 DIABETES MELLITUS WITHOUT COMPLICATION, WITHOUT LONG-TERM CURRENT USE OF INSULIN (HCC): ICD-10-CM

## 2024-04-22 DIAGNOSIS — N52.9 ERECTILE DYSFUNCTION, UNSPECIFIED ERECTILE DYSFUNCTION TYPE: ICD-10-CM

## 2024-04-22 DIAGNOSIS — Z01.818 PRE-OP TESTING: ICD-10-CM

## 2024-04-22 NOTE — TELEPHONE ENCOUNTER
Spoke to pt and his wife. Aware of TAVR on 5/7/24. He is aware of need for PAT bloodwork and urine studies. Asked him to do these at the end of this week or early next week. Per Dr. Mccormick, aware that last dose of eliquis will be 5/3/24. Aware to only take carvedilol the morning of TAVR. Aware of NPO after MN prior to TAVR and to arrive at 8:30am on 5/7/24. Ml GRAHAM

## 2024-04-26 ENCOUNTER — HOSPITAL ENCOUNTER (OUTPATIENT)
Age: 84
Discharge: HOME | End: 2024-04-26
Payer: MEDICARE

## 2024-04-26 DIAGNOSIS — I35.0 NONRHEUMATIC AORTIC VALVE STENOSIS: ICD-10-CM

## 2024-04-26 DIAGNOSIS — Z01.818 PRE-OP TESTING: ICD-10-CM

## 2024-04-26 LAB
ANION GAP SERPL CALCULATED.3IONS-SCNC: 13 MMOL/L (ref 3–16)
BILIRUB UR QL STRIP.AUTO: NEGATIVE
BUN SERPL-MCNC: 17 MG/DL (ref 7–20)
CALCIUM SERPL-MCNC: 9.8 MG/DL (ref 8.3–10.6)
CHLORIDE SERPL-SCNC: 103 MMOL/L (ref 99–110)
CLARITY UR: CLEAR
CO2 SERPL-SCNC: 26 MMOL/L (ref 21–32)
COLOR UR: YELLOW
CREAT SERPL-MCNC: 0.6 MG/DL (ref 0.8–1.3)
DEPRECATED RDW RBC AUTO: 19 % (ref 12.4–15.4)
GFR SERPLBLD CREATININE-BSD FMLA CKD-EPI: >90 ML/MIN/{1.73_M2}
GLUCOSE SERPL-MCNC: 91 MG/DL (ref 70–99)
GLUCOSE UR STRIP.AUTO-MCNC: NEGATIVE MG/DL
HCT VFR BLD AUTO: 37.4 % (ref 40.5–52.5)
HGB BLD-MCNC: 12.6 G/DL (ref 13.5–17.5)
HGB UR QL STRIP.AUTO: NEGATIVE
INR PPP: 1.33 (ref 0.85–1.15)
KETONES UR STRIP.AUTO-MCNC: NEGATIVE MG/DL
LEUKOCYTE ESTERASE UR QL STRIP.AUTO: NEGATIVE
MAGNESIUM SERPL-MCNC: 1.8 MG/DL (ref 1.8–2.4)
MCH RBC QN AUTO: 27.5 PG (ref 26–34)
MCHC RBC AUTO-ENTMCNC: 33.7 G/DL (ref 31–36)
MCV RBC AUTO: 81.7 FL (ref 80–100)
NITRITE UR QL STRIP.AUTO: NEGATIVE
PH UR STRIP.AUTO: 6 [PH] (ref 5–8)
PLATELET # BLD AUTO: 208 K/UL (ref 135–450)
PMV BLD AUTO: 8.5 FL (ref 5–10.5)
POTASSIUM SERPL-SCNC: 5 MMOL/L (ref 3.5–5.1)
PROT UR STRIP.AUTO-MCNC: NEGATIVE MG/DL
PROTHROMBIN TIME: 16.7 SEC (ref 11.9–14.9)
RBC # BLD AUTO: 4.58 M/UL (ref 4.2–5.9)
SODIUM SERPL-SCNC: 142 MMOL/L (ref 136–145)
SP GR UR STRIP.AUTO: 1.02 (ref 1–1.03)
UA DIPSTICK W REFLEX MICRO PNL UR: NORMAL
URN SPEC COLLECT METH UR: NORMAL
UROBILINOGEN UR STRIP-ACNC: 0.2 E.U./DL
WBC # BLD AUTO: 5.2 K/UL (ref 4–11)

## 2024-04-26 PROCEDURE — 85027 COMPLETE CBC AUTOMATED: CPT

## 2024-04-26 PROCEDURE — 85610 PROTHROMBIN TIME: CPT

## 2024-04-26 PROCEDURE — 83735 ASSAY OF MAGNESIUM: CPT

## 2024-04-26 PROCEDURE — 80048 BASIC METABOLIC PNL TOTAL CA: CPT

## 2024-04-26 PROCEDURE — 36415 COLL VENOUS BLD VENIPUNCTURE: CPT

## 2024-04-26 PROCEDURE — 81003 URINALYSIS AUTO W/O SCOPE: CPT

## 2024-05-02 DIAGNOSIS — I35.0 AORTIC STENOSIS, SEVERE: Primary | ICD-10-CM

## 2024-05-07 ENCOUNTER — HOSPITAL ENCOUNTER (INPATIENT)
Age: 84
LOS: 1 days | Discharge: HOME | DRG: 266 | End: 2024-05-08
Attending: INTERNAL MEDICINE | Admitting: INTERNAL MEDICINE
Payer: MEDICARE

## 2024-05-07 ENCOUNTER — ANESTHESIA (OUTPATIENT)
Age: 84
DRG: 266 | End: 2024-05-07
Payer: MEDICARE

## 2024-05-07 ENCOUNTER — APPOINTMENT (OUTPATIENT)
Age: 84
DRG: 266 | End: 2024-05-07
Attending: INTERNAL MEDICINE
Payer: MEDICARE

## 2024-05-07 ENCOUNTER — ANESTHESIA EVENT (OUTPATIENT)
Age: 84
DRG: 266 | End: 2024-05-07
Payer: MEDICARE

## 2024-05-07 ENCOUNTER — TELEPHONE (OUTPATIENT)
Dept: CARDIOLOGY CLINIC | Age: 84
End: 2024-05-07

## 2024-05-07 DIAGNOSIS — I35.0 AORTIC VALVE STENOSIS, ETIOLOGY OF CARDIAC VALVE DISEASE UNSPECIFIED: ICD-10-CM

## 2024-05-07 DIAGNOSIS — Z95.2 S/P TAVR (TRANSCATHETER AORTIC VALVE REPLACEMENT): Primary | ICD-10-CM

## 2024-05-07 DIAGNOSIS — Z95.2 HISTORY OF TRANSCATHETER AORTIC VALVE REPLACEMENT (TAVR): Primary | ICD-10-CM

## 2024-05-07 DIAGNOSIS — I35.0 AORTIC STENOSIS, SEVERE: ICD-10-CM

## 2024-05-07 LAB
ABO + RH BLD: NORMAL
ACTIVATED CLOTTING TIME: 114 SEC (ref 99–130)
ACTIVATED CLOTTING TIME: 123 SEC (ref 99–130)
ACTIVATED CLOTTING TIME: 249 SEC (ref 99–130)
BASE EXCESS BLDA CALC-SCNC: 2 MMOL/L (ref -3–3)
BLD GP AB SCN SERPL QL: NORMAL
BLOOD BANK DISPENSE STATUS: NORMAL
BLOOD BANK PRODUCT CODE: NORMAL
BPU ID: NORMAL
CA-I BLD-SCNC: 1.28 MMOL/L (ref 1.12–1.32)
CO2 BLDA-SCNC: 30 MMOL/L
DESCRIPTION BLOOD BANK: NORMAL
ECHO AV AREA PEAK VELOCITY: 1.5 CM2
ECHO AV AREA VTI: 1.4 CM2
ECHO AV AREA/BSA PEAK VELOCITY: 0.7 CM2/M2
ECHO AV AREA/BSA VTI: 0.7 CM2/M2
ECHO AV MEAN GRADIENT: 19 MMHG
ECHO AV MEAN VELOCITY: 2 M/S
ECHO AV PEAK GRADIENT: 29 MMHG
ECHO AV PEAK VELOCITY: 2.7 M/S
ECHO AV VELOCITY RATIO: 0.3
ECHO AV VTI: 64.6 CM
ECHO BSA: 2.2 M2
ECHO BSA: 2.2 M2
ECHO LV EDV A4C: 95 ML
ECHO LV EDV INDEX A4C: 44 ML/M2
ECHO LV EJECTION FRACTION A4C: 52 %
ECHO LV ESV A4C: 45 ML
ECHO LV ESV INDEX A4C: 21 ML/M2
ECHO LV FRACTIONAL SHORTENING: 19 % (ref 28–44)
ECHO LV INTERNAL DIMENSION DIASTOLE INDEX: 2.51 CM/M2
ECHO LV INTERNAL DIMENSION DIASTOLIC: 5.4 CM (ref 4.2–5.9)
ECHO LV INTERNAL DIMENSION SYSTOLIC INDEX: 2.05 CM/M2
ECHO LV INTERNAL DIMENSION SYSTOLIC: 4.4 CM
ECHO LV IVSD: 1.5 CM (ref 0.6–1)
ECHO LV MASS 2D: 328.3 G (ref 88–224)
ECHO LV MASS INDEX 2D: 152.7 G/M2 (ref 49–115)
ECHO LV POSTERIOR WALL DIASTOLIC: 1.3 CM (ref 0.6–1)
ECHO LV RELATIVE WALL THICKNESS RATIO: 0.48
ECHO LVOT AREA: 5.3 CM2
ECHO LVOT AV VTI INDEX: 0.26
ECHO LVOT DIAM: 2.6 CM
ECHO LVOT MEAN GRADIENT: 1 MMHG
ECHO LVOT PEAK GRADIENT: 2 MMHG
ECHO LVOT PEAK VELOCITY: 0.8 M/S
ECHO LVOT STROKE VOLUME INDEX: 41.7 ML/M2
ECHO LVOT SV: 89.7 ML
ECHO LVOT VTI: 16.9 CM
GLUCOSE BLD-MCNC: 104 MG/DL (ref 70–99)
GLUCOSE BLD-MCNC: 105 MG/DL (ref 70–99)
GLUCOSE BLD-MCNC: 125 MG/DL (ref 70–99)
GLUCOSE BLD-MCNC: 136 MG/DL (ref 70–99)
HCO3 BLDA-SCNC: 28.5 MMOL/L (ref 21–29)
HCT VFR BLD AUTO: 35 % (ref 40.5–52.5)
HGB BLD CALC-MCNC: 11.9 GM/DL (ref 13.5–17.5)
LACTATE BLD-SCNC: 0.6 MMOL/L (ref 0.4–2)
NT-PROBNP SERPL-MCNC: 1811 PG/ML (ref 0–449)
PCO2 BLDA: 62.6 MM HG (ref 35–45)
PERFORMED ON: ABNORMAL
PH BLDA: 7.27 [PH] (ref 7.35–7.45)
PO2 BLDA: 245 MM HG (ref 75–108)
POC SAMPLE TYPE: ABNORMAL
POTASSIUM BLD-SCNC: 4 MMOL/L (ref 3.5–5.1)
SAO2 % BLDA: 100 % (ref 93–100)
SODIUM BLD-SCNC: 140 MMOL/L (ref 136–145)
TROPONIN, HIGH SENSITIVITY: 23 NG/L (ref 0–22)

## 2024-05-07 PROCEDURE — 93321 DOPPLER ECHO F-UP/LMTD STD: CPT | Performed by: INTERNAL MEDICINE

## 2024-05-07 PROCEDURE — 86900 BLOOD TYPING SEROLOGIC ABO: CPT

## 2024-05-07 PROCEDURE — B41G1ZZ FLUOROSCOPY OF LEFT LOWER EXTREMITY ARTERIES USING LOW OSMOLAR CONTRAST: ICD-10-PCS | Performed by: STUDENT IN AN ORGANIZED HEALTH CARE EDUCATION/TRAINING PROGRAM

## 2024-05-07 PROCEDURE — C1889 IMPLANT/INSERT DEVICE, NOC: HCPCS | Performed by: INTERNAL MEDICINE

## 2024-05-07 PROCEDURE — 85014 HEMATOCRIT: CPT

## 2024-05-07 PROCEDURE — 6370000000 HC RX 637 (ALT 250 FOR IP): Performed by: INTERNAL MEDICINE

## 2024-05-07 PROCEDURE — 82947 ASSAY GLUCOSE BLOOD QUANT: CPT

## 2024-05-07 PROCEDURE — 2709999900 HC NON-CHARGEABLE SUPPLY: Performed by: INTERNAL MEDICINE

## 2024-05-07 PROCEDURE — 93005 ELECTROCARDIOGRAM TRACING: CPT | Performed by: INTERNAL MEDICINE

## 2024-05-07 PROCEDURE — 93325 DOPPLER ECHO COLOR FLOW MAPG: CPT | Performed by: INTERNAL MEDICINE

## 2024-05-07 PROCEDURE — 2580000003 HC RX 258: Performed by: INTERNAL MEDICINE

## 2024-05-07 PROCEDURE — 33361 REPLACE AORTIC VALVE PERQ: CPT | Performed by: STUDENT IN AN ORGANIZED HEALTH CARE EDUCATION/TRAINING PROGRAM

## 2024-05-07 PROCEDURE — 86923 COMPATIBILITY TEST ELECTRIC: CPT

## 2024-05-07 PROCEDURE — 36415 COLL VENOUS BLD VENIPUNCTURE: CPT

## 2024-05-07 PROCEDURE — 86850 RBC ANTIBODY SCREEN: CPT

## 2024-05-07 PROCEDURE — 6360000002 HC RX W HCPCS: Performed by: INTERNAL MEDICINE

## 2024-05-07 PROCEDURE — 93308 TTE F-UP OR LMTD: CPT | Performed by: INTERNAL MEDICINE

## 2024-05-07 PROCEDURE — 84132 ASSAY OF SERUM POTASSIUM: CPT

## 2024-05-07 PROCEDURE — 82330 ASSAY OF CALCIUM: CPT

## 2024-05-07 PROCEDURE — B41F1ZZ FLUOROSCOPY OF RIGHT LOWER EXTREMITY ARTERIES USING LOW OSMOLAR CONTRAST: ICD-10-PCS | Performed by: STUDENT IN AN ORGANIZED HEALTH CARE EDUCATION/TRAINING PROGRAM

## 2024-05-07 PROCEDURE — 84484 ASSAY OF TROPONIN QUANT: CPT

## 2024-05-07 PROCEDURE — 93325 DOPPLER ECHO COLOR FLOW MAPG: CPT

## 2024-05-07 PROCEDURE — C1760 CLOSURE DEV, VASC: HCPCS | Performed by: INTERNAL MEDICINE

## 2024-05-07 PROCEDURE — 3700000001 HC ADD 15 MINUTES (ANESTHESIA): Performed by: INTERNAL MEDICINE

## 2024-05-07 PROCEDURE — 82803 BLOOD GASES ANY COMBINATION: CPT

## 2024-05-07 PROCEDURE — 83605 ASSAY OF LACTIC ACID: CPT

## 2024-05-07 PROCEDURE — 83880 ASSAY OF NATRIURETIC PEPTIDE: CPT

## 2024-05-07 PROCEDURE — 86901 BLOOD TYPING SEROLOGIC RH(D): CPT

## 2024-05-07 PROCEDURE — 2060000000 HC ICU INTERMEDIATE R&B

## 2024-05-07 PROCEDURE — 84295 ASSAY OF SERUM SODIUM: CPT

## 2024-05-07 PROCEDURE — 94150 VITAL CAPACITY TEST: CPT

## 2024-05-07 PROCEDURE — 3700000000 HC ANESTHESIA ATTENDED CARE: Performed by: INTERNAL MEDICINE

## 2024-05-07 PROCEDURE — 6360000004 HC RX CONTRAST MEDICATION: Performed by: INTERNAL MEDICINE

## 2024-05-07 PROCEDURE — C1769 GUIDE WIRE: HCPCS | Performed by: INTERNAL MEDICINE

## 2024-05-07 PROCEDURE — 33361 REPLACE AORTIC VALVE PERQ: CPT | Performed by: INTERNAL MEDICINE

## 2024-05-07 PROCEDURE — 02RF38N REPLACEMENT OF AORTIC VALVE WITH ZOOPLASTIC TISSUE, USING RAPID DEPLOYMENT TECHNIQUE, PERCUTANEOUS APPROACH: ICD-10-PCS | Performed by: STUDENT IN AN ORGANIZED HEALTH CARE EDUCATION/TRAINING PROGRAM

## 2024-05-07 PROCEDURE — 6360000002 HC RX W HCPCS: Performed by: NURSE ANESTHETIST, CERTIFIED REGISTERED

## 2024-05-07 PROCEDURE — C1894 INTRO/SHEATH, NON-LASER: HCPCS | Performed by: INTERNAL MEDICINE

## 2024-05-07 PROCEDURE — 85347 COAGULATION TIME ACTIVATED: CPT

## 2024-05-07 PROCEDURE — 94761 N-INVAS EAR/PLS OXIMETRY MLT: CPT

## 2024-05-07 RX ORDER — ASPIRIN 81 MG/1
81 TABLET ORAL DAILY
Status: DISCONTINUED | OUTPATIENT
Start: 2024-05-07 | End: 2024-05-08 | Stop reason: HOSPADM

## 2024-05-07 RX ORDER — SODIUM CHLORIDE 9 MG/ML
INJECTION, SOLUTION INTRAVENOUS CONTINUOUS PRN
Status: DISCONTINUED | OUTPATIENT
Start: 2024-05-07 | End: 2024-05-07 | Stop reason: SDUPTHER

## 2024-05-07 RX ORDER — INSULIN LISPRO 100 [IU]/ML
0-4 INJECTION, SOLUTION INTRAVENOUS; SUBCUTANEOUS NIGHTLY
Status: DISCONTINUED | OUTPATIENT
Start: 2024-05-07 | End: 2024-05-08 | Stop reason: HOSPADM

## 2024-05-07 RX ORDER — INSULIN LISPRO 100 [IU]/ML
0-4 INJECTION, SOLUTION INTRAVENOUS; SUBCUTANEOUS
Status: DISCONTINUED | OUTPATIENT
Start: 2024-05-07 | End: 2024-05-08 | Stop reason: HOSPADM

## 2024-05-07 RX ORDER — HEPARIN SODIUM 1000 [USP'U]/ML
INJECTION, SOLUTION INTRAVENOUS; SUBCUTANEOUS PRN
Status: DISCONTINUED | OUTPATIENT
Start: 2024-05-07 | End: 2024-05-07 | Stop reason: SDUPTHER

## 2024-05-07 RX ORDER — ACETAMINOPHEN 325 MG/1
650 TABLET ORAL EVERY 4 HOURS PRN
Status: DISCONTINUED | OUTPATIENT
Start: 2024-05-07 | End: 2024-05-08 | Stop reason: HOSPADM

## 2024-05-07 RX ORDER — PROPOFOL 10 MG/ML
INJECTION, EMULSION INTRAVENOUS PRN
Status: DISCONTINUED | OUTPATIENT
Start: 2024-05-07 | End: 2024-05-07 | Stop reason: SDUPTHER

## 2024-05-07 RX ORDER — ATROPINE SULFATE 1 MG/ML
0.5 INJECTION, SOLUTION INTRAVENOUS
Status: DISPENSED | OUTPATIENT
Start: 2024-05-07 | End: 2024-05-08

## 2024-05-07 RX ORDER — LIDOCAINE 4 G/G
1 PATCH TOPICAL ONCE
Status: COMPLETED | OUTPATIENT
Start: 2024-05-07 | End: 2024-05-07

## 2024-05-07 RX ORDER — CARVEDILOL 6.25 MG/1
6.25 TABLET ORAL 2 TIMES DAILY WITH MEALS
Status: DISCONTINUED | OUTPATIENT
Start: 2024-05-07 | End: 2024-05-08 | Stop reason: HOSPADM

## 2024-05-07 RX ORDER — HYDRALAZINE HYDROCHLORIDE 20 MG/ML
10 INJECTION INTRAMUSCULAR; INTRAVENOUS EVERY 6 HOURS PRN
Status: DISCONTINUED | OUTPATIENT
Start: 2024-05-07 | End: 2024-05-08 | Stop reason: HOSPADM

## 2024-05-07 RX ORDER — CLOPIDOGREL BISULFATE 75 MG/1
75 TABLET ORAL DAILY
Status: DISCONTINUED | OUTPATIENT
Start: 2024-05-07 | End: 2024-05-08 | Stop reason: HOSPADM

## 2024-05-07 RX ORDER — LISINOPRIL 10 MG/1
10 TABLET ORAL DAILY
Status: DISCONTINUED | OUTPATIENT
Start: 2024-05-07 | End: 2024-05-08 | Stop reason: HOSPADM

## 2024-05-07 RX ORDER — SODIUM CHLORIDE 0.9 % (FLUSH) 0.9 %
5-40 SYRINGE (ML) INJECTION PRN
Status: DISCONTINUED | OUTPATIENT
Start: 2024-05-07 | End: 2024-05-08 | Stop reason: HOSPADM

## 2024-05-07 RX ORDER — SODIUM CHLORIDE, SODIUM LACTATE, POTASSIUM CHLORIDE, CALCIUM CHLORIDE 600; 310; 30; 20 MG/100ML; MG/100ML; MG/100ML; MG/100ML
INJECTION, SOLUTION INTRAVENOUS ONCE
Status: COMPLETED | OUTPATIENT
Start: 2024-05-07 | End: 2024-05-07

## 2024-05-07 RX ORDER — NITROGLYCERIN 20 MG/100ML
5 INJECTION INTRAVENOUS CONTINUOUS
Status: DISCONTINUED | OUTPATIENT
Start: 2024-05-07 | End: 2024-05-08 | Stop reason: HOSPADM

## 2024-05-07 RX ORDER — SODIUM CHLORIDE 0.9 % (FLUSH) 0.9 %
5-40 SYRINGE (ML) INJECTION EVERY 12 HOURS SCHEDULED
Status: DISCONTINUED | OUTPATIENT
Start: 2024-05-07 | End: 2024-05-08 | Stop reason: HOSPADM

## 2024-05-07 RX ORDER — DOCUSATE SODIUM 100 MG/1
100 CAPSULE, LIQUID FILLED ORAL DAILY
Status: DISCONTINUED | OUTPATIENT
Start: 2024-05-07 | End: 2024-05-08 | Stop reason: HOSPADM

## 2024-05-07 RX ORDER — SODIUM CHLORIDE 9 MG/ML
INJECTION, SOLUTION INTRAVENOUS PRN
Status: DISCONTINUED | OUTPATIENT
Start: 2024-05-07 | End: 2024-05-08 | Stop reason: HOSPADM

## 2024-05-07 RX ORDER — 0.9 % SODIUM CHLORIDE 0.9 %
500 INTRAVENOUS SOLUTION INTRAVENOUS PRN
Status: DISCONTINUED | OUTPATIENT
Start: 2024-05-07 | End: 2024-05-08 | Stop reason: HOSPADM

## 2024-05-07 RX ORDER — ONDANSETRON 2 MG/ML
4 INJECTION INTRAMUSCULAR; INTRAVENOUS EVERY 6 HOURS PRN
Status: DISCONTINUED | OUTPATIENT
Start: 2024-05-07 | End: 2024-05-08 | Stop reason: HOSPADM

## 2024-05-07 RX ORDER — FUROSEMIDE 40 MG/1
40 TABLET ORAL DAILY
Status: DISCONTINUED | OUTPATIENT
Start: 2024-05-08 | End: 2024-05-08 | Stop reason: HOSPADM

## 2024-05-07 RX ORDER — PROTAMINE SULFATE 10 MG/ML
INJECTION, SOLUTION INTRAVENOUS PRN
Status: DISCONTINUED | OUTPATIENT
Start: 2024-05-07 | End: 2024-05-07 | Stop reason: SDUPTHER

## 2024-05-07 RX ADMIN — SODIUM CHLORIDE: 9 INJECTION, SOLUTION INTRAVENOUS at 10:38

## 2024-05-07 RX ADMIN — LISINOPRIL 10 MG: 10 TABLET ORAL at 17:05

## 2024-05-07 RX ADMIN — PROTAMINE SULFATE 100 MG: 10 INJECTION, SOLUTION INTRAVENOUS at 11:56

## 2024-05-07 RX ADMIN — HEPARIN SODIUM 10000 UNITS: 1000 INJECTION INTRAVENOUS; SUBCUTANEOUS at 11:32

## 2024-05-07 RX ADMIN — PHENYLEPHRINE HYDROCHLORIDE 50 MCG/MIN: 10 INJECTION INTRAVENOUS at 10:48

## 2024-05-07 RX ADMIN — SODIUM CHLORIDE, POTASSIUM CHLORIDE, SODIUM LACTATE AND CALCIUM CHLORIDE: 600; 310; 30; 20 INJECTION, SOLUTION INTRAVENOUS at 09:15

## 2024-05-07 RX ADMIN — VANCOMYCIN HYDROCHLORIDE 1000 MG: 1 INJECTION, POWDER, LYOPHILIZED, FOR SOLUTION INTRAVENOUS at 10:49

## 2024-05-07 RX ADMIN — PROPOFOL 50 MG: 10 INJECTION, EMULSION INTRAVENOUS at 10:47

## 2024-05-07 RX ADMIN — CARVEDILOL 6.25 MG: 6.25 TABLET, FILM COATED ORAL at 17:06

## 2024-05-07 RX ADMIN — ASPIRIN 81 MG: 81 TABLET, COATED ORAL at 17:05

## 2024-05-07 RX ADMIN — HEPARIN SODIUM 3000 UNITS: 1000 INJECTION INTRAVENOUS; SUBCUTANEOUS at 11:39

## 2024-05-07 RX ADMIN — PROPOFOL 100 MCG/KG/MIN: 10 INJECTION, EMULSION INTRAVENOUS at 10:49

## 2024-05-07 RX ADMIN — SODIUM CHLORIDE, PRESERVATIVE FREE 10 ML: 5 INJECTION INTRAVENOUS at 20:08

## 2024-05-07 RX ADMIN — CLOPIDOGREL BISULFATE 75 MG: 75 TABLET ORAL at 17:05

## 2024-05-07 RX ADMIN — DOCUSATE SODIUM 100 MG: 100 CAPSULE, LIQUID FILLED ORAL at 17:05

## 2024-05-07 ASSESSMENT — ENCOUNTER SYMPTOMS: SHORTNESS OF BREATH: 1

## 2024-05-07 ASSESSMENT — PAIN SCALES - GENERAL
PAINLEVEL_OUTOF10: 0
PAINLEVEL_OUTOF10: 0

## 2024-05-07 NOTE — PROGRESS NOTES
Incentive Spirometry education and demonstration completed by Respiratory Therapy Yes      Response to education: Excellent     Teaching Time: 5 minutes    Minimum Predicted Vital Capacity - 707 mL.  Patient's Actual Vital Capacity - 1500 mL. Turning over to Nursing for routine follow-up Yes.    Comments:     Electronically signed by Ivan Frias RCP on 5/7/2024 at 4:28 PM

## 2024-05-07 NOTE — PROGRESS NOTES
Pt transported to room 5913 from the cath lab. RN received report over the phone and at the bedside. Right and left groin site assessed, WNL. Pt can sit up at this time. Can get put of the bed and ambulate at 1800.

## 2024-05-07 NOTE — DISCHARGE INSTRUCTIONS
Post Transcatheter Aortic Valve Replacement (TAVR) Discharge Instructions                               We are here for you! If you have any questions please call: Rajani Hernández RN Valve Coordinator at  [phone]      Do you have the help you need at home?                                          ACTIVITY:  Weigh yourself every day in the morning after using the bathroom.  Your discharge weight was: 226 lb  NO DRIVING UNTIL MONDAY.  You may ride in a car.   Do not lift more than five to ten pounds for the first week you are home.  (A gallon of milk is 7 lbs)  Get up and get dressed every day. Do not stay in bed. Set a daily routine. This is an important step in re-gaining your strength.  You may walk up and down stairs.   Walk as much as you can.  This will help facilitate the healing process.  Plan rest periods during the day.  Cough and deep breathe, and use your incentive spirometer 10 times every hour while you are awake.   Avoid work that increases muscle tension. (straining with bowel movements, moving furniture)    WOUND CARE:  Shower every day but no bathtubs, pools, or hot tubs for the first week you are home.   Cleanse wounds with mild soap and water and pat dry. Keep site dry.   A small amount of bloody or clear drainage is normal.   Watch for signs of infection: incision red or hot to the touch, fever > 101F, swelling at the groin or incision site, discolored drainage from your incision.     NUTRITION:   Low fat, low salt diet (guidelines for Heart Healthy eating)  Limit caffeine to 1-2 cups per day (coffee, tea, chocolate, soda)  Eat high fiber to avoid constipation and straining during bowel movements (fresh veggies and fruit, whole grains)  Limit alcohol to two servings a day ( 8 oz beer, 1 oz liquor, 4 oz wine)    HEALTHY HABITS:  No Smoking!!!! If you need help to stop smoking, please call your doctor.  Attempt to achieve and maintain your ideal body weight.  We suggest that you walk as much as you

## 2024-05-07 NOTE — ADDENDUM NOTE
Addendum  created 05/07/24 1222 by Donovan Alexander APRN - CRNA    Intraprocedure Meds edited

## 2024-05-07 NOTE — ANESTHESIA PRE PROCEDURE
Applicable):  No results found for: \"HIV\", \"HEPCAB\"    COVID-19 Screening (If Applicable):   Lab Results   Component Value Date/Time    COVID19 Not Detected 12/08/2021 09:30 AM           Anesthesia Evaluation  Patient summary reviewed and Nursing notes reviewed   no history of anesthetic complications:   Airway: Mallampati: III  TM distance: >3 FB   Neck ROM: limited  Mouth opening: > = 3 FB   Dental: normal exam         Pulmonary:   (+)   shortness of breath:                                    Cardiovascular:  Exercise tolerance: poor (<4 METS)  (+) hypertension:, valvular problems/murmurs (mod AS): AS, pacemaker:, dysrhythmias: atrial fibrillation, pulmonary hypertension:                  Neuro/Psych:   Negative Neuro/Psych ROS              GI/Hepatic/Renal: Neg GI/Hepatic/Renal ROS            Endo/Other:    (+) DiabetesType II DM, : arthritis: OA..                 Abdominal:             Vascular: negative vascular ROS.         Other Findings:         TTE procedure:ECHOCARDIOGRAM COMPLETE 2D W DOPPLER W COLOR.     Procedure Date  Date: 04/12/2022 Start: 01:38 PM     Study Location: [facility] - Echo Lab  Technical Quality: Adequate visualization     Indications:Aortic stenosis.     Patient Status: Routine     Height: 69 inches Weight: 230.01 pounds BSA: 2.19 m2 BMI: 33.97 kg/m2     BP: 146/70 mmHg      Conclusions      Summary   Limited echo for aortic stenosis.   LV systolic function is normal with a visually estimated EF of 55%.   The left ventricle is normal in size with mild concentric hypertrophy.   No obvious regional wall motion abnormalities noted.   Elevated left atrial pressures with a septal E/e' ratio of 28.9.   Severe bi-atrial enlargement.   The maximal transaortic velocity is 3.33m/s which gives peak pressure   gradient= 44mmHg and mean pressure gradient= 25mmHg which is c/w moderate   aortic stenosis   Mild aortic and tricuspid regurgitation.   Systolic pulmonary artery pressure (SPAP)

## 2024-05-07 NOTE — H&P
[facility]  H+P  Consult  OP Visit  FU Visit   CC/HPI   CC Here for TAVR for severe symptomatic AS.    HPI Reports worsening symptoms including sob, henriquez, orthopnea, pnd, le edema.    Cardiac Hx PPM   Troponin No results found for: \"TROPHS\"   HISTORY/ALLERGY/ROS   MEDHx  has a past medical history of Arthritis, Chronic gout without tophus, Diabetes mellitus (HCC), ED (erectile dysfunction), Erectile dysfunction, Hyperlipidemia, Hypertension, Nocturia, Pre-diabetes, and Prostate CA (HCC).   SURGHx  has a past surgical history that includes Prostate surgery; Tonsillectomy; Knee arthroscopy; knee surgery (Left, 1963); Colonoscopy (9/24/14); joint replacement (Left, 9/1/2015); Cataract removal (Bilateral, 03/2017); and Total hip arthroplasty (Left, 12/13/2021).   SOCHx  reports that he has never smoked. He has never used smokeless tobacco. He reports current alcohol use. He reports that he does not use drugs.   FAMHx family history includes Cancer in his father.   ALLERG Bactrim [sulfamethoxazole-trimethoprim] and Pcn [penicillins]   ROS Full ROS obtained and negative except as mentioned in HPI   MEDICATIONS   Medications reviewed in Epic.   PHYSICAL EXAM   Vitals There were no vitals taken for this visit.   Gen Alert, coop, no distress Heart  RRR, 3/6   Head NC, AT, no abnorm Abd  Soft, NT, +BS, no mass, no OM   Eyes PER, conj/corn clear Ext  Ext nl, AT, no C/C/E   Nose Nares nl, no drain, NT Pulse 2+ and symmetric   Throat Lips, mucosa, tongue nl Skin Col/text/turg nl, no vis rash/les   Neck S/S, TM, NT, no bruit/JVD Psych Nl mood and affect   Lung CTA-B, unlabored, no DTP Lymph   No cervical or axillary LA   Ch wall NT, no deform Neuro  Nl gross M/S exam      ASSESSMENT AND PLAN   *AS    Date EF Detail   Sx     As above   TTE 2/22 2/24  55%   50% AS MG 25, PV 3.33  AS MG 33, MG 3.86, mod MR, TR, severe LVH   STTE 4/24  Scheduled this morning   [facility]   none   Plan     TAVR today, R/B/O discussed  Appreciate

## 2024-05-07 NOTE — PROGRESS NOTES
Pt verified information regarding surgery, name, birth date, surgeon, procedure and allergies.. Patient transferred to [facility] for surgery. Appropriate antibiotics ordered: Vancomycin . Beta blocker: Coreg @ 0700 . DVT prophyaxis: EBONIE's and SCD's. Lab work within normal limits, 4 units of RBC's on call to OR, vital signs stable, Mepilex sacral border applied and 2% chlorhexidine gluconate skin prep given. Patient and family educated about surgery and pain management. To cath lab per cart @ 1581.

## 2024-05-07 NOTE — TELEPHONE ENCOUNTER
Irma, can you move pts OV with Dr. Santana in [place] to June (currently scheduled in July) with an echo for his 1 month post TAVR FU? No need to call. Ml GRAHAM

## 2024-05-07 NOTE — OP NOTE
Cardiothoracic TAVR Operative Report    Patient Name: Anup Del Cid  Patient MRN: [id number]  Patient : 1940    Service date: 2024     1: Thor Mccormick MD   2: Morgan Santizo MD  Surgeon: [place] Velazquez MD  Anesthesiologist: Mahesh Wu MD  CRNA: Donovan Alexander APRN - REJI     Pre-operative diagnosis:   1. Severe symptomatic aortic stenosis  2. Coronary artery disease, s/p LAD PCI   3. Afib on Eliquis  4. Leadless PPM  5. Hypertension  6. Hyperlipidemia  7. Type 2 Diabetes  8. Gout  9. Hx prostate CA    Post-operative diagnosis:  Same    Procedure/description:  1. Transcatheter aortic valve replacement with a 29mm valve via left transfemoral approach; CPT code 52461    Findings:  1. Heavily calcified proximal R CFA s/p several second Perclose attempts  2. Access changed to L CFA which was successful without issues  3. Post-operatively we noted mild echocardiographic evidence of perivalvular leak, and valve was post-dilated with +1mL to volume on same delivery system balloon with resultant trace PVL    Implants:  29mm S3 Brenner Bovine Pericardial Tissue valve serial #: [id number]    Implant Name Type Inv. Item Serial No.  Lot No. LRB No. Used Action   VALVE AORTIC TRANSCATHETER HEART 29MM MARIELENA 3 ULTRA RESILIA - [id number] Aortic valves VALVE AORTIC TRANSCATHETER HEART 29MM MARIELENA 3 ULTRA RESILIA [id number] [employer] KELSY-  N/A 1 Implanted        Estimated Blood Loss:  less than 100 ml    Drains:  None    Wires:  None    Complications:  None    Condition of Patient at Transfer:  stable    Operative Indications:  Anup Del Cid is a 83 y.o. male with the co-morbidities documented above, who noted progressive shortness of breath and dyspnea on exertion. Echocardiography demonstrated severe aortic stenosis with a relatively preserved ejection fraction. Preoperative CT angiography demonstrated appropriate size of the right ileo-femoral system for transfemoral

## 2024-05-07 NOTE — PLAN OF CARE

## 2024-05-07 NOTE — ANESTHESIA POSTPROCEDURE EVALUATION
Department of Anesthesiology  Postprocedure Note    Patient: Anup Del Cid  MRN: [id number]  YOB: 1940  Date of evaluation: 5/7/2024    Procedure Summary       Date: 05/07/24 Room / Location: [facility] CATH/HYBRID LAB [address] [place] [address] CARDIAC CATH LAB    Anesthesia Start: 1038 Anesthesia Stop:     Procedures:       TRANSCATHETER AORTIC VALVE REPLACEMENT (CATH)      TRANSCATHETER AORTIC VALVE REPLACEMENT FEMORAL APPROACH Diagnosis:       Aortic valve stenosis, etiology of cardiac valve disease unspecified      (Aortic valve stenosis, etiology of cardiac valve disease unspecified [I35.0])    Providers: Thor Mccormick MD; [place] Velazquez MD Responsible Provider: Mahesh Wu MD    Anesthesia Type: MAC ASA Status: 3            Anesthesia Type: No value filed.    Taryn Phase I: Taryn Score: 10    Taryn Phase II:      Anesthesia Post Evaluation    Patient location during evaluation: PACU  Patient participation: complete - patient participated  Level of consciousness: awake  Airway patency: patent  Nausea & Vomiting: no vomiting and no nausea  Cardiovascular status: hemodynamically stable  Respiratory status: acceptable  Hydration status: stable  Multimodal analgesia pain management approach  Pain management: adequate    No notable events documented.

## 2024-05-08 ENCOUNTER — APPOINTMENT (OUTPATIENT)
Age: 84
DRG: 266 | End: 2024-05-08
Attending: INTERNAL MEDICINE
Payer: MEDICARE

## 2024-05-08 VITALS
RESPIRATION RATE: 16 BRPM | TEMPERATURE: 97.8 F | SYSTOLIC BLOOD PRESSURE: 152 MMHG | WEIGHT: 226 LBS | DIASTOLIC BLOOD PRESSURE: 82 MMHG | BODY MASS INDEX: 33.47 KG/M2 | OXYGEN SATURATION: 96 % | HEIGHT: 69 IN | HEART RATE: 68 BPM

## 2024-05-08 LAB
ANION GAP SERPL CALCULATED.3IONS-SCNC: 12 MMOL/L (ref 3–16)
BUN SERPL-MCNC: 13 MG/DL (ref 7–20)
CALCIUM SERPL-MCNC: 9 MG/DL (ref 8.3–10.6)
CHLORIDE SERPL-SCNC: 101 MMOL/L (ref 99–110)
CO2 SERPL-SCNC: 23 MMOL/L (ref 21–32)
CREAT SERPL-MCNC: <0.5 MG/DL (ref 0.8–1.3)
DEPRECATED RDW RBC AUTO: 18.9 % (ref 12.4–15.4)
ECHO AO ASC DIAM: 3.8 CM
ECHO AO ASCENDING AORTA INDEX: 1.74 CM/M2
ECHO AO ROOT DIAM: 3.6 CM
ECHO AO ROOT INDEX: 1.65 CM/M2
ECHO AV AREA PEAK VELOCITY: 6.1 CM2
ECHO AV AREA VTI: 6.2 CM2
ECHO AV AREA/BSA PEAK VELOCITY: 2.8 CM2/M2
ECHO AV AREA/BSA VTI: 2.8 CM2/M2
ECHO AV MEAN GRADIENT: 5 MMHG
ECHO AV MEAN VELOCITY: 1.1 M/S
ECHO AV PEAK GRADIENT: 10 MMHG
ECHO AV PEAK VELOCITY: 1.6 M/S
ECHO AV VELOCITY RATIO: 0.94
ECHO AV VTI: 28.4 CM
ECHO BSA: 2.23 M2
ECHO LA AREA 2C: 30.1 CM2
ECHO LA AREA 4C: 27.9 CM2
ECHO LA DIAMETER INDEX: 2.39 CM/M2
ECHO LA DIAMETER: 5.2 CM
ECHO LA MAJOR AXIS: 7.2 CM
ECHO LA MINOR AXIS: 7 CM
ECHO LA TO AORTIC ROOT RATIO: 1.44
ECHO LA VOL BP: 97 ML (ref 18–58)
ECHO LA VOL MOD A2C: 105 ML (ref 18–58)
ECHO LA VOL MOD A4C: 87 ML (ref 18–58)
ECHO LA VOL/BSA BIPLANE: 44 ML/M2 (ref 16–34)
ECHO LA VOLUME INDEX MOD A2C: 48 ML/M2 (ref 16–34)
ECHO LA VOLUME INDEX MOD A4C: 40 ML/M2 (ref 16–34)
ECHO LV E' LATERAL VELOCITY: 11 CM/S
ECHO LV E' SEPTAL VELOCITY: 5 CM/S
ECHO LV EDV A2C: 160 ML
ECHO LV EDV A4C: 134 ML
ECHO LV EDV INDEX A4C: 61 ML/M2
ECHO LV EDV NDEX A2C: 73 ML/M2
ECHO LV EJECTION FRACTION A2C: 35 %
ECHO LV ESV A2C: 104 ML
ECHO LV ESV INDEX A2C: 48 ML/M2
ECHO LV FRACTIONAL SHORTENING: 20 % (ref 28–44)
ECHO LV INTERNAL DIMENSION DIASTOLE INDEX: 2.25 CM/M2
ECHO LV INTERNAL DIMENSION DIASTOLIC: 4.9 CM (ref 4.2–5.9)
ECHO LV INTERNAL DIMENSION SYSTOLIC INDEX: 1.79 CM/M2
ECHO LV INTERNAL DIMENSION SYSTOLIC: 3.9 CM
ECHO LV IVSD: 1.5 CM (ref 0.6–1)
ECHO LV MASS 2D: 328.7 G (ref 88–224)
ECHO LV MASS INDEX 2D: 150.8 G/M2 (ref 49–115)
ECHO LV POSTERIOR WALL DIASTOLIC: 1.6 CM (ref 0.6–1)
ECHO LV RELATIVE WALL THICKNESS RATIO: 0.65
ECHO LVOT AREA: 6.6 CM2
ECHO LVOT AV VTI INDEX: 0.94
ECHO LVOT DIAM: 2.9 CM
ECHO LVOT MEAN GRADIENT: 5 MMHG
ECHO LVOT PEAK GRADIENT: 8 MMHG
ECHO LVOT PEAK VELOCITY: 1.5 M/S
ECHO LVOT STROKE VOLUME INDEX: 80.9 ML/M2
ECHO LVOT SV: 176.3 ML
ECHO LVOT VTI: 26.7 CM
ECHO MV A VELOCITY: 0.4 M/S
ECHO MV E VELOCITY: 1.04 M/S
ECHO MV E/A RATIO: 2.6
ECHO MV E/E' LATERAL: 9.45
ECHO MV E/E' RATIO (AVERAGED): 15.13
ECHO MV REGURGITANT PEAK GRADIENT: 108 MMHG
ECHO MV REGURGITANT PEAK VELOCITY: 5.2 M/S
ECHO PULMONARY ARTERY END DIASTOLIC PRESSURE: 5 MMHG
ECHO PV MAX VELOCITY: 1 M/S
ECHO PV PEAK GRADIENT: 4 MMHG
ECHO PV REGURGITANT MAX VELOCITY: 1.2 M/S
ECHO RA AREA 4C: 24.2 CM2
ECHO RA END SYSTOLIC VOLUME APICAL 4 CHAMBER INDEX BSA: 32 ML/M2
ECHO RA VOLUME: 69 ML
ECHO RV BASAL DIMENSION: 4 CM
ECHO RV FREE WALL PEAK S': 14 CM/S
ECHO RV LONGITUDINAL DIMENSION: 9.2 CM
ECHO RV MID DIMENSION: 4.2 CM
ECHO RV TAPSE: 1.9 CM (ref 1.7–?)
ECHO TV REGURGITANT MAX VELOCITY: 3.32 M/S
ECHO TV REGURGITANT PEAK GRADIENT: 44 MMHG
EKG ATRIAL RATE: 326 BPM
EKG DIAGNOSIS: NORMAL
EKG Q-T INTERVAL: 518 MS
EKG QRS DURATION: 166 MS
EKG QTC CALCULATION (BAZETT): 518 MS
EKG R AXIS: 103 DEGREES
EKG T AXIS: 48 DEGREES
EKG VENTRICULAR RATE: 60 BPM
GFR SERPLBLD CREATININE-BSD FMLA CKD-EPI: >90 ML/MIN/{1.73_M2}
GLUCOSE BLD-MCNC: 101 MG/DL (ref 70–99)
GLUCOSE BLD-MCNC: 114 MG/DL (ref 70–99)
GLUCOSE SERPL-MCNC: 93 MG/DL (ref 70–99)
HCT VFR BLD AUTO: 33.5 % (ref 40.5–52.5)
HGB BLD-MCNC: 11.1 G/DL (ref 13.5–17.5)
MCH RBC QN AUTO: 27.4 PG (ref 26–34)
MCHC RBC AUTO-ENTMCNC: 33.1 G/DL (ref 31–36)
MCV RBC AUTO: 82.7 FL (ref 80–100)
PERFORMED ON: ABNORMAL
PERFORMED ON: ABNORMAL
PLATELET # BLD AUTO: 166 K/UL (ref 135–450)
PMV BLD AUTO: 7.5 FL (ref 5–10.5)
POTASSIUM SERPL-SCNC: 3.9 MMOL/L (ref 3.5–5.1)
RBC # BLD AUTO: 4.05 M/UL (ref 4.2–5.9)
SODIUM SERPL-SCNC: 136 MMOL/L (ref 136–145)
WBC # BLD AUTO: 6.6 K/UL (ref 4–11)

## 2024-05-08 PROCEDURE — 2580000003 HC RX 258: Performed by: INTERNAL MEDICINE

## 2024-05-08 PROCEDURE — 36415 COLL VENOUS BLD VENIPUNCTURE: CPT

## 2024-05-08 PROCEDURE — 6370000000 HC RX 637 (ALT 250 FOR IP): Performed by: INTERNAL MEDICINE

## 2024-05-08 PROCEDURE — 93306 TTE W/DOPPLER COMPLETE: CPT | Performed by: INTERNAL MEDICINE

## 2024-05-08 PROCEDURE — 85027 COMPLETE CBC AUTOMATED: CPT

## 2024-05-08 PROCEDURE — 94761 N-INVAS EAR/PLS OXIMETRY MLT: CPT

## 2024-05-08 PROCEDURE — 93306 TTE W/DOPPLER COMPLETE: CPT

## 2024-05-08 PROCEDURE — 93010 ELECTROCARDIOGRAM REPORT: CPT | Performed by: INTERNAL MEDICINE

## 2024-05-08 PROCEDURE — 80048 BASIC METABOLIC PNL TOTAL CA: CPT

## 2024-05-08 RX ORDER — DEXTROSE MONOHYDRATE 100 MG/ML
INJECTION, SOLUTION INTRAVENOUS CONTINUOUS PRN
Status: DISCONTINUED | OUTPATIENT
Start: 2024-05-08 | End: 2024-05-08 | Stop reason: HOSPADM

## 2024-05-08 RX ORDER — FUROSEMIDE 40 MG/1
40 TABLET ORAL DAILY
Qty: 90 TABLET | Refills: 3 | Status: SHIPPED | OUTPATIENT
Start: 2024-05-08

## 2024-05-08 RX ORDER — GLUCAGON 1 MG/ML
1 KIT INJECTION PRN
Status: DISCONTINUED | OUTPATIENT
Start: 2024-05-08 | End: 2024-05-08 | Stop reason: HOSPADM

## 2024-05-08 RX ORDER — CARVEDILOL 12.5 MG/1
12.5 TABLET ORAL 2 TIMES DAILY WITH MEALS
Qty: 180 TABLET | Refills: 3 | Status: SHIPPED | OUTPATIENT
Start: 2024-05-08

## 2024-05-08 RX ORDER — ATORVASTATIN CALCIUM 40 MG/1
40 TABLET, FILM COATED ORAL NIGHTLY
Status: DISCONTINUED | OUTPATIENT
Start: 2024-05-08 | End: 2024-05-08 | Stop reason: HOSPADM

## 2024-05-08 RX ADMIN — CARVEDILOL 6.25 MG: 6.25 TABLET, FILM COATED ORAL at 08:12

## 2024-05-08 RX ADMIN — DOCUSATE SODIUM 100 MG: 100 CAPSULE, LIQUID FILLED ORAL at 08:12

## 2024-05-08 RX ADMIN — FUROSEMIDE 40 MG: 40 TABLET ORAL at 08:12

## 2024-05-08 RX ADMIN — CLOPIDOGREL BISULFATE 75 MG: 75 TABLET ORAL at 08:12

## 2024-05-08 RX ADMIN — LISINOPRIL 10 MG: 10 TABLET ORAL at 08:12

## 2024-05-08 RX ADMIN — SODIUM CHLORIDE, PRESERVATIVE FREE 10 ML: 5 INJECTION INTRAVENOUS at 12:03

## 2024-05-08 ASSESSMENT — PAIN SCALES - GENERAL
PAINLEVEL_OUTOF10: 0
PAINLEVEL_OUTOF10: 0

## 2024-05-08 NOTE — PROGRESS NOTES
Data- discharge order received, pt verbalized agreement to discharge, disposition to previous residence, no needs for HHC/DME.     Action- discharge instructions prepared and given to pt, pt verbalized understanding. Medication information packet given r/t NEW and/or CHANGED prescriptions emphasizing name/purpose/side effects, pt verbalized understanding. Discharge instruction summary: Diet- regular, Activity- up as tolerated, Primary Care Physician as follows: Camilla Rios -[phone] f/u appointment in 1 week, immunizations reviewed and updated, prescription medications filled at pharmacy. Inpatient surgical procedure precautions reviewed: TAVR CHF Education reviewed. Pt/ Family has had a total of 60 minutes CHF education this admission encounter.     1. WEIGHT: Admit Weight - Scale: 99.3 kg (219 lb) (05/07/24 0943)        Today  Weight - Scale: 102.5 kg (226 lb) (05/08/24 0959)       2. O2 SAT.: SpO2: 96 % (05/08/24 1203)    Response- Pt belongings gathered, IV removed. Disposition is home (no HHC/DME needs), transported with RN, taken to lobby via w/c w/ wife, no complications.

## 2024-05-08 NOTE — CARE COORDINATION
Discharge Planning:     (CM) reviewed the patient's chart to assess needs. Patient's Readmission Risk Score is 15%. Patient's medical insurance is [employer]. Patient's PCP is JANET HAZEL.   No needs anticipated, at this time. CM team to follow. Staff to inform CM if additional discharge needs arise.     Electronically signed by Cande Lau on 5/8/2024 at 8:47 AM

## 2024-05-08 NOTE — PLAN OF CARE
Problem: Chronic Conditions and Co-morbidities  Goal: Patient's chronic conditions and co-morbidity symptoms are monitored and maintained or improved  5/7/2024 2216 by Stacy Adams RN  Outcome: Progressing     Problem: Discharge Planning  Goal: Discharge to home or other facility with appropriate resources  5/7/2024 2216 by Stacy Adams RN  Outcome: Progressing     Problem: Pain  Goal: Verbalizes/displays adequate comfort level or baseline comfort level  5/7/2024 2216 by Stacy Adams RN  Outcome: Progressing     Problem: ABCDS Injury Assessment  Goal: Absence of physical injury  5/7/2024 2216 by Stacy Adams RN  Outcome: Progressing     Problem: Skin/Tissue Integrity  Goal: Absence of new skin breakdown  Description: 1.  Monitor for areas of redness and/or skin breakdown  2.  Assess vascular access sites hourly  3.  Every 4-6 hours minimum:  Change oxygen saturation probe site  4.  Every 4-6 hours:  If on nasal continuous positive airway pressure, respiratory therapy assess nares and determine need for appliance change or resting period.  5/7/2024 2216 by Stacy Adams RN  Outcome: Progressing     Problem: Safety - Adult  Goal: Free from fall injury  5/7/2024 2216 by Stacy Adams RN  Outcome: Progressing

## 2024-05-08 NOTE — PLAN OF CARE
Problem: Chronic Conditions and Co-morbidities  Goal: Patient's chronic conditions and co-morbidity symptoms are monitored and maintained or improved  5/8/2024 1630 by Lorelei Szymanski RN  Outcome: Completed  5/8/2024 0937 by Lorelei Szymanski RN  Outcome: Progressing     Problem: Discharge Planning  Goal: Discharge to home or other facility with appropriate resources  5/8/2024 1630 by Lorelei Szymanski RN  Outcome: Completed  5/8/2024 0937 by Lorelei Szymanski RN  Outcome: Progressing     Problem: Pain  Goal: Verbalizes/displays adequate comfort level or baseline comfort level  5/8/2024 1630 by Lorelei Szymanski RN  Outcome: Completed  5/8/2024 0937 by Lorelei Szymanski RN  Outcome: Progressing     Problem: ABCDS Injury Assessment  Goal: Absence of physical injury  5/8/2024 1630 by Lorelei Szymanski RN  Outcome: Completed  5/8/2024 0937 by Lorelei Szymanski RN  Outcome: Progressing     Problem: Skin/Tissue Integrity  Goal: Absence of new skin breakdown  Description: 1.  Monitor for areas of redness and/or skin breakdown  2.  Assess vascular access sites hourly  3.  Every 4-6 hours minimum:  Change oxygen saturation probe site  4.  Every 4-6 hours:  If on nasal continuous positive airway pressure, respiratory therapy assess nares and determine need for appliance change or resting period.  5/8/2024 1630 by Lorelei Szymanski RN  Outcome: Completed  5/8/2024 0937 by Lorelei Szymanski RN  Outcome: Progressing     Problem: Safety - Adult  Goal: Free from fall injury  5/8/2024 1630 by Lorelei Szymanski RN  Outcome: Completed  5/8/2024 0937 by Lorelei Szymanski RN  Outcome: Progressing

## 2024-05-08 NOTE — PLAN OF CARE
Problem: Discharge Planning  Goal: Discharge to home or other facility with appropriate resources  5/8/2024 0937 by Lorelei Szymanski, RN  Outcome: Progressing     Problem: Pain  Goal: Verbalizes/displays adequate comfort level or baseline comfort level  5/8/2024 0937 by Lorelei Szymanski, RN  Outcome: Progressing     Problem: ABCDS Injury Assessment  Goal: Absence of physical injury  5/8/2024 0937 by Lorelei Szymanski, RN  Outcome: Progressing     Problem: Skin/Tissue Integrity  Goal: Absence of new skin breakdown  Description: 1.  Monitor for areas of redness and/or skin breakdown  2.  Assess vascular access sites hourly  3.  Every 4-6 hours minimum:  Change oxygen saturation probe site  4.  Every 4-6 hours:  If on nasal continuous positive airway pressure, respiratory therapy assess nares and determine need for appliance change or resting period.  5/8/2024 0937 by Lorelei Szymanski, RN  Outcome: Progressing

## 2024-05-09 ENCOUNTER — TELEPHONE (OUTPATIENT)
Dept: FAMILY MEDICINE CLINIC | Age: 84
End: 2024-05-09

## 2024-05-09 ENCOUNTER — CARE COORDINATION (OUTPATIENT)
Dept: CASE MANAGEMENT | Age: 84
End: 2024-05-09

## 2024-05-09 DIAGNOSIS — I35.0 AORTIC STENOSIS, SEVERE: Primary | ICD-10-CM

## 2024-05-09 PROCEDURE — 1111F DSCHRG MED/CURRENT MED MERGE: CPT | Performed by: FAMILY MEDICINE

## 2024-05-09 NOTE — TELEPHONE ENCOUNTER
Care Transitions Initial Follow Up Call    Outreach made within 2 business days of discharge: Yes    Patient: Anup Del Cid Patient : 1940   MRN: [id number]  Reason for Admission: There are no discharge diagnoses documented for the most recent discharge.  Discharge Date: 24       Spoke with: Anup spoke with the patient and he stated that he is doing well and will be following up with his cardiologist.  Patient had a planned procedure.    Discharge department/facility: [facility] Interactive Patient Contact:  Was patient able to fill all prescriptions: Yes  Was patient instructed to bring all medications to the follow-up visit: Yes  Is patient taking all medications as directed in the discharge summary? Yes  Does patient understand their discharge instructions: Yes  Does patient have questions or concerns that need addressed prior to 7-14 day follow up office visit: no    Scheduled appointment with PCP within 7-14 days    Follow Up  Future Appointments   Date Time Provider Department Center   2024  2:00 PM [facility] ECHO 1 [facility] HUSSEIN [facility] Rad/Cad   2024  2:45 PM Thor Mccormick MD [facility]   2024  7:30 AM SCHEDULE, CLARE REMOTE TRANSMISSION [facility]   2024 11:00 AM Ramirez Santana MD [facility]   2024  9:30 AM Camilla Rios DO [facility]  [place] [facility]   2024 10:30 AM NIURKA, CLARE DEVICE CHECK [facility]   2024 10:30 AM Leann Edouard, APRN - CNP [facility]       Kristy Yancey LPN

## 2024-05-09 NOTE — CARE COORDINATION
Care Transitions Initial Follow Up Call    Call within 2 business days of discharge: Yes    Patient Current Location:  Home: [address] [place] [id number]    Care Transition Nurse contacted the patient by telephone to perform post hospital discharge assessment. Verified name and  with patient as identifiers. Provided introduction to self, and explanation of the Care Transition Nurse role.     Patient: Anup Del Cid Patient : 1940   MRN: [id number]  Reason for Admission:   Discharge Date: 24 RARS: Readmission Risk Score: 13.8      Last Discharge Facility       Date Complaint Diagnosis Description Type Department Provider    24  History of transcatheter aortic valve replacement (TAVR) ... Admission (Discharged) [facility] 5C Thor Mccormick MD            Was this an external facility discharge? No Discharge Facility:     Challenges to be reviewed by the provider   Additional needs identified to be addressed with provider: No  none               Method of communication with provider: none.    Pt states doing well, no issues or concerns. Dressing CDI. F/U appts listed below. Agreed to more CTC f/u calls.      Care Transition Nurse reviewed discharge instructions with patient who verbalized understanding. The patient was given an opportunity to ask questions and does not have any further questions or concerns at this time. Were discharge instructions available to patient? Yes. Reviewed appropriate site of care based on symptoms and resources available to patient including: When to call 911. The patient agrees to contact the PCP office for questions related to their healthcare.     Advance Care Planning:   Does patient have an Advance Directive: not on file.    Medication reconciliation was performed with patient, who verbalizes understanding of administration of home medications. Medications reviewed, 1111F entered: yes    Was patient discharged with a pulse oximeter? no    Non-face-to-face

## 2024-05-16 ENCOUNTER — CARE COORDINATION (OUTPATIENT)
Dept: CASE MANAGEMENT | Age: 84
End: 2024-05-16

## 2024-05-16 NOTE — CARE COORDINATION
No  Have your medications changed?: No  Do you have any questions related to your medications?: No  Do you currently have any active services?: No  Do you have any needs or concerns that I can assist you with?: No  Identified Barriers: None  Care Transitions Interventions  No Identified Needs  Other Interventions:             Care Transition Nurse provided contact information for future needs. Plan for follow-up call in 5-7 days based on severity of symptoms and risk factors.  Plan for next call: self management-     David Phan RN

## 2024-05-23 ENCOUNTER — CARE COORDINATION (OUTPATIENT)
Dept: CASE MANAGEMENT | Age: 84
End: 2024-05-23

## 2024-05-23 NOTE — CARE COORDINATION
Calls  Do you have any ongoing symptoms?: No  Have your medications changed?: No  Do you have any questions related to your medications?: No  Do you currently have any active services?: No  Do you have any needs or concerns that I can assist you with?: No  Identified Barriers: None  Care Transitions Interventions  No Identified Needs  Other Interventions:             Care Transition Nurse provided contact information for future needs. Plan for follow-up call in 5-7 days based on severity of symptoms and risk factors.  Plan for next call: self management-   follow-up appointment-     David Phan RN

## 2024-05-30 ENCOUNTER — CARE COORDINATION (OUTPATIENT)
Dept: CASE MANAGEMENT | Age: 84
End: 2024-05-30

## 2024-05-30 DIAGNOSIS — Z95.2 S/P TAVR (TRANSCATHETER AORTIC VALVE REPLACEMENT): ICD-10-CM

## 2024-05-30 DIAGNOSIS — I35.0 NONRHEUMATIC AORTIC VALVE STENOSIS: Primary | ICD-10-CM

## 2024-05-30 NOTE — CARE COORDINATION
Care Transitions Follow Up Call    Patient Current Location:  Home: [address] [place] [id number]    Care Transition Nurse contacted the patient by telephone to follow up after admission.  Verified name and  with patient as identifiers.    Patient: Anup Del Cid  Patient : 1940   MRN: [id number]  Reason for Admission:   Discharge Date: 24 RARS: Readmission Risk Score: 13.8      Needs to be reviewed by the provider   Additional needs identified to be addressed with provider: No  none             Method of communication with provider: none.    Pt states doing well, no issues or concerns.F/U appts listed below. Agreed to more CTC f/u calls.      Addressed changes since last contact:  none  Follow Up  Future Appointments   Date Time Provider Department Center   2024  2:00 PM [facility] ECHO 1 [facility] Rad/Cad   2024  2:45 PM Thor Mccormick MD [facility]   2024  7:30 AM SCHEDULE, CLARE REMOTE TRANSMISSION [facility]   2024 11:00 AM Ramirez Santana MD [facility]   2024  9:30 AM Camilla Rios DO [facility] [place] - [facility]   2024 10:30 AM SCHEDULE, CLARE DEVICE CHECK [facility]   2024 10:30 AM Leann Edouard, APRN - CNP [facility]       Care Transition Nurse reviewed medical action plan with patient and discussed any barriers to care and/or understanding of plan of care after discharge. Discussed appropriate site of care based on symptoms and resources available to patient including: When to call 911. The patient agrees to contact the PCP office for questions related to their healthcare.     Advance Care Planning:   not on file; education provided.     Offered patient enrollment in the Remote Patient Monitoring (RPM) program for in-home monitoring: Patient is not eligible for RPM program because: patient does not have qualifying diagnosis.     Care Transitions Subsequent and Final Call    Subsequent and Final Calls  Do you have

## 2024-05-31 ENCOUNTER — HOSPITAL ENCOUNTER (OUTPATIENT)
Age: 84
End: 2024-05-31
Payer: MEDICARE

## 2024-05-31 ENCOUNTER — OFFICE VISIT (OUTPATIENT)
Age: 84
End: 2024-05-31
Payer: MEDICARE

## 2024-05-31 VITALS
HEART RATE: 82 BPM | SYSTOLIC BLOOD PRESSURE: 120 MMHG | OXYGEN SATURATION: 98 % | HEIGHT: 69 IN | BODY MASS INDEX: 32.73 KG/M2 | DIASTOLIC BLOOD PRESSURE: 74 MMHG | WEIGHT: 221 LBS

## 2024-05-31 VITALS — BODY MASS INDEX: 33.47 KG/M2 | WEIGHT: 226 LBS | HEIGHT: 69 IN

## 2024-05-31 DIAGNOSIS — I35.0 AORTIC VALVE STENOSIS, NONRHEUMATIC: Primary | ICD-10-CM

## 2024-05-31 DIAGNOSIS — I48.91 ATRIAL FIBRILLATION, UNSPECIFIED TYPE (HCC): ICD-10-CM

## 2024-05-31 DIAGNOSIS — R00.1 BRADYCARDIA: ICD-10-CM

## 2024-05-31 DIAGNOSIS — E78.00 HYPERCHOLESTEROLEMIA: ICD-10-CM

## 2024-05-31 DIAGNOSIS — I25.83 CORONARY ARTERY DISEASE DUE TO LIPID RICH PLAQUE: ICD-10-CM

## 2024-05-31 DIAGNOSIS — I25.10 CORONARY ARTERY DISEASE DUE TO LIPID RICH PLAQUE: ICD-10-CM

## 2024-05-31 DIAGNOSIS — Z95.2 S/P TAVR (TRANSCATHETER AORTIC VALVE REPLACEMENT): ICD-10-CM

## 2024-05-31 DIAGNOSIS — I35.0 NONRHEUMATIC AORTIC VALVE STENOSIS: ICD-10-CM

## 2024-05-31 DIAGNOSIS — I10 ESSENTIAL HYPERTENSION: ICD-10-CM

## 2024-05-31 LAB
ECHO AV AREA PEAK VELOCITY: 2.3 CM2
ECHO AV AREA VTI: 2.2 CM2
ECHO AV AREA/BSA PEAK VELOCITY: 1.1 CM2/M2
ECHO AV AREA/BSA VTI: 1 CM2/M2
ECHO AV MEAN GRADIENT: 5 MMHG
ECHO AV MEAN VELOCITY: 1.1 M/S
ECHO AV PEAK GRADIENT: 11 MMHG
ECHO AV PEAK VELOCITY: 1.6 M/S
ECHO AV VELOCITY RATIO: 0.75
ECHO AV VTI: 34.6 CM
ECHO BSA: 2.23 M2
ECHO EST RA PRESSURE: 3 MMHG
ECHO LA AREA 2C: 25.8 CM2
ECHO LA AREA 4C: 22.9 CM2
ECHO LA MAJOR AXIS: 5.8 CM
ECHO LA MINOR AXIS: 6.2 CM
ECHO LA VOL BP: 82 ML (ref 18–58)
ECHO LA VOL MOD A2C: 89 ML (ref 18–58)
ECHO LA VOL MOD A4C: 70 ML (ref 18–58)
ECHO LA VOL/BSA BIPLANE: 38 ML/M2 (ref 16–34)
ECHO LA VOLUME INDEX MOD A2C: 41 ML/M2 (ref 16–34)
ECHO LA VOLUME INDEX MOD A4C: 32 ML/M2 (ref 16–34)
ECHO LV E' LATERAL VELOCITY: 15 CM/S
ECHO LV E' SEPTAL VELOCITY: 6 CM/S
ECHO LV FRACTIONAL SHORTENING: 22 % (ref 28–44)
ECHO LV GLOBAL LONGITUDINAL STRAIN (GLS): -13.8 %
ECHO LV INTERNAL DIMENSION DIASTOLE INDEX: 2.25 CM/M2
ECHO LV INTERNAL DIMENSION DIASTOLIC: 4.9 CM (ref 4.2–5.9)
ECHO LV INTERNAL DIMENSION SYSTOLIC INDEX: 1.74 CM/M2
ECHO LV INTERNAL DIMENSION SYSTOLIC: 3.8 CM
ECHO LV IVSD: 1.5 CM (ref 0.6–1)
ECHO LV MASS 2D: 297.5 G (ref 88–224)
ECHO LV MASS INDEX 2D: 136.5 G/M2 (ref 49–115)
ECHO LV POSTERIOR WALL DIASTOLIC: 1.4 CM (ref 0.6–1)
ECHO LV RELATIVE WALL THICKNESS RATIO: 0.57
ECHO LVOT AREA: 3.1 CM2
ECHO LVOT AV VTI INDEX: 0.71
ECHO LVOT DIAM: 2 CM
ECHO LVOT MEAN GRADIENT: 3 MMHG
ECHO LVOT PEAK GRADIENT: 6 MMHG
ECHO LVOT PEAK VELOCITY: 1.2 M/S
ECHO LVOT STROKE VOLUME INDEX: 35.6 ML/M2
ECHO LVOT SV: 77.6 ML
ECHO LVOT VTI: 24.7 CM
ECHO MV AREA VTI: 3.1 CM2
ECHO MV E VELOCITY: 0.99 M/S
ECHO MV E/E' LATERAL: 6.6
ECHO MV E/E' RATIO (AVERAGED): 11.55
ECHO MV E/E' SEPTAL: 16.5
ECHO MV LVOT VTI INDEX: 1
ECHO MV MAX VELOCITY: 1.5 M/S
ECHO MV MEAN GRADIENT: 3 MMHG
ECHO MV MEAN VELOCITY: 0.8 M/S
ECHO MV PEAK GRADIENT: 9 MMHG
ECHO MV REGURGITANT PEAK GRADIENT: 71 MMHG
ECHO MV REGURGITANT PEAK VELOCITY: 4.2 M/S
ECHO MV REGURGITANT VTIA: 144 CM
ECHO MV VTI: 24.7 CM
ECHO PV MAX VELOCITY: 1.1 M/S
ECHO PV PEAK GRADIENT: 5 MMHG
ECHO RIGHT VENTRICULAR SYSTOLIC PRESSURE (RVSP): 20 MMHG
ECHO RV FREE WALL PEAK S': 9 CM/S
ECHO TV REGURGITANT MAX VELOCITY: 2.06 M/S
ECHO TV REGURGITANT PEAK GRADIENT: 17 MMHG

## 2024-05-31 PROCEDURE — 1123F ACP DISCUSS/DSCN MKR DOCD: CPT | Performed by: INTERNAL MEDICINE

## 2024-05-31 PROCEDURE — 99214 OFFICE O/P EST MOD 30 MIN: CPT | Performed by: INTERNAL MEDICINE

## 2024-05-31 PROCEDURE — 3074F SYST BP LT 130 MM HG: CPT | Performed by: INTERNAL MEDICINE

## 2024-05-31 PROCEDURE — 3078F DIAST BP <80 MM HG: CPT | Performed by: INTERNAL MEDICINE

## 2024-05-31 PROCEDURE — 93306 TTE W/DOPPLER COMPLETE: CPT

## 2024-05-31 RX ORDER — CLOPIDOGREL BISULFATE 75 MG/1
75 TABLET ORAL DAILY
Qty: 90 TABLET | Refills: 3 | Status: SHIPPED | OUTPATIENT
Start: 2024-05-31

## 2024-05-31 NOTE — PROGRESS NOTES
EF Detail   Sx     As above   Hx 5/24  ES3U Resilia 29mm +1cc   TTE 2/22 2/24 5/24 5/24  55%   50%  50% AS MG 25, PV 3.33  AS MG 33, MG 3.86, mod MR, TR, severe LVH  TAVR MG 5, trace AI  TAVR MG 5, no AI   STTE 4/24   PV 4.09, MG 43   LHC 4/24   PCI LAD (Anny)   Plan     Echo yearly  Appreciate referral from Dr Santana   *AF/Bradycardia  Status Plan   S/p leadless PPM  Per EP   *HTN  Status Advice Plan   Controlled Diet/salt/xcise/wt counseling Continue antihypertensives at current dosages   *CHOL  LDL Advice Plan   62, 3/24 Diet/salt/xcise/wt counseling Continue MT/HI statin   *FOLLOWUP  1 month

## 2024-06-03 NOTE — TELEPHONE ENCOUNTER
Refill Request     CONFIRM preferred pharmacy with the patient.    If Mail Order Rx - Pend for 90 day refill.      Last Seen: Last Seen Department: 3/5/2024  Last Seen by PCP: 3/5/2024    Last Written: 11/14/23 180 with 1 refill     If no future appointment scheduled:  Review the last OV with PCP and review information for follow-up visit,  Route STAFF MESSAGE with patient name to the  Pool for scheduling with the following information:            -  Timing of next visit           -  Visit type ie Physical, OV, etc           -  Diagnoses/Reason ie. COPD, HTN - Do not use MEDICATION, Follow-up or CHECK UP - Give reason for visit      Next Appointment:   Future Appointments   Date Time Provider Department Center   6/18/2024  7:30 AM SCHEDULE, CLARE REMOTE TRANSMISSION [facility]   7/23/2024 11:00 AM Ramirez Santana MD [facility]   9/6/2024  9:30 AM Camilla Rios DO [facility]  [place] [facility]   9/23/2024 10:30 AM SCHEDULE, CLARE DEVICE CHECK [facility]   9/23/2024 10:30 AM Leann Edouard, APRN - CNP [facility]       Message sent to  to schedule appt with patient?  NO      Requested Prescriptions     Pending Prescriptions Disp Refills    metFORMIN (GLUCOPHAGE) 500 MG tablet [Pharmacy Med Name: metFORMIN HCl Oral Tablet 500 MG] 180 tablet 0     Sig: TAKE 1 TABLET BY MOUTH TWO TIMES A DAY WITH MEALS

## 2024-06-03 NOTE — TELEPHONE ENCOUNTER
Refill Request     CONFIRM preferred pharmacy with the patient.    If Mail Order Rx - Pend for 90 day refill.      Last Seen: Last Seen Department: 3/5/2024  Last Seen by PCP: 3/5/2024    Last Written: 11/14/23 180 with 1 refill     If no future appointment scheduled:  Review the last OV with PCP and review information for follow-up visit,  Route STAFF MESSAGE with patient name to the  Pool for scheduling with the following information:            -  Timing of next visit           -  Visit type ie Physical, OV, etc           -  Diagnoses/Reason ie. COPD, HTN - Do not use MEDICATION, Follow-up or CHECK UP - Give reason for visit      Next Appointment:   Future Appointments   Date Time Provider Department Center   6/18/2024  7:30 AM SCHEDULE, CLARE REMOTE TRANSMISSION [employer] [facility]   7/23/2024 11:00 AM Ramirez Santana MD [email or web address] [facility]   9/6/2024  9:30 AM Camilla Rios DO [facility]  [place] [facility]   9/23/2024 10:30 AM SCHEDULE, CLARE DEVICE CHECK [facility]   9/23/2024 10:30 AM Leann Edouard, APRN - CNP [facility]       Message sent to  to schedule appt with patient?  NO      Requested Prescriptions     Pending Prescriptions Disp Refills    metFORMIN (GLUCOPHAGE) 500 MG tablet [Pharmacy Med Name: metFORMIN HCl Oral Tablet 500 MG] 90 tablet 0     Sig: TAKE 1 TABLET BY MOUTH EVERY DAY with breakfast

## 2024-06-05 NOTE — TELEPHONE ENCOUNTER
Received phone call from the pt in regards to he has been taking 500 mg metformin 2 pills per day to be 1000mg and the rx was only filled for 500mg please adjust and resend to the pharmacy.

## 2024-06-06 ENCOUNTER — CARE COORDINATION (OUTPATIENT)
Dept: CASE MANAGEMENT | Age: 84
End: 2024-06-06

## 2024-06-06 NOTE — CARE COORDINATION
Care Transitions Note    Final Call      Patient Current Location:  Home: [address] [id number]    Care Transition Nurse contacted the patient by telephone. Verified name and  as identifiers.    Patient graduated from the Care Transitions program on 2024.  Patient/family verbalizes confidence in the ability to self-manage at this time..      Advance Care Planning:   Does patient have an Advance Directive: reviewed during previous call, see note. .    Handoff:   Patient was not referred to the ACM team due to no additional needs identified.       Care Summary Note: Pt states doing well, no issues or concerns. No need for further f/u CTC calls.      Assessments:  Care Transitions Subsequent and Final Call    Subsequent and Final Calls  Have your medications changed?: No  Do you have any needs or concerns that I can assist you with?: No  Identified Barriers: None  Care Transitions Interventions  No Identified Needs  Other Interventions:              Upcoming Appointments:    Future Appointments         Provider Specialty Dept Phone    2024 7:30 AM SCHEDULE, CLARE REMOTE TRANSMISSION Cardiology [phone] 11:00 AM Ramirez Santana MD Cardiology [phone] 9:30 AM Camilla Rios DO Family Medicine [phone]    2024 10:30 AM CLARE [facility] DEVICE CHECK [facility] [phone] 10:30 AM Leann Edouard APRN - CNP [facility] [phone]            Patient has agreed to contact primary care provider and/or specialist for any further questions, concerns, or needs.    David Phan RN

## 2024-06-18 ENCOUNTER — NURSE ONLY (OUTPATIENT)
Dept: CARDIOLOGY CLINIC | Age: 84
End: 2024-06-18

## 2024-06-18 DIAGNOSIS — Z95.0 PACEMAKER: ICD-10-CM

## 2024-06-27 PROCEDURE — 93294 REM INTERROG EVL PM/LDLS PM: CPT | Performed by: INTERNAL MEDICINE

## 2024-06-27 PROCEDURE — 93296 REM INTERROG EVL PM/IDS: CPT | Performed by: INTERNAL MEDICINE

## 2024-07-16 NOTE — PROGRESS NOTES
lisinopril (PRINIVIL;ZESTRIL) 20 MG tablet TAKE 1/2 TABLET BY MOUTH EVERY DAY 3/18/24  Yes Ramirez Santana MD   Multiple Vitamins-Minerals (THERAPEUTIC MULTIVITAMIN-MINERALS) tablet Take 1 tablet by mouth daily   Yes ProviderLilliam MD        Allergies:  Bactrim [sulfamethoxazole-trimethoprim] and Pcn [penicillins]     Review of Systems:   Constitutional: there has been no unanticipated weight loss. There's been no change in energy level, sleep pattern, or activity level.     Eyes: No visual changes or diplopia. No scleral icterus.  ENT: No Headaches, hearing loss or vertigo. No mouth sores or sore throat.  Cardiovascular: Reviewed in HPI  Respiratory: No cough or wheezing, no sputum production. No hematemesis.    Gastrointestinal: No abdominal pain, appetite loss, blood in stools. No change in bowel or bladder habits.  Genitourinary: No dysuria, trouble voiding, or hematuria.  Musculoskeletal:  No gait disturbance, weakness or joint complaints.  Integumentary: No rash or pruritis.  Neurological: No headache, diplopia, change in muscle strength, numbness or tingling. No change in gait, balance, coordination, mood, affect, memory, mentation, behavior.  Psychiatric: No anxiety, no depression.  Endocrine: No malaise, fatigue or temperature intolerance. No excessive thirst, fluid intake, or urination. No tremor.  Hematologic/Lymphatic: No abnormal bruising or bleeding, blood clots or swollen lymph nodes.  Allergic/Immunologic: No nasal congestion or hives.    Physical Examination:    Vitals:    07/23/24 1107   BP: (!) 102/50   Pulse: 79   SpO2: 98%       Constitutional and General Appearance: NAD   Respiratory:  Normal excursion and expansion without use of accessory muscles  Resp Auscultation: Normal breath sounds without dullness Clear  Cardiovascular:  The apical impulses not displaced  Heart tones are crisp and normal  Cervical veins are not engorged  The carotid upstroke is normal in amplitude and

## 2024-07-23 ENCOUNTER — OFFICE VISIT (OUTPATIENT)
Dept: CARDIOLOGY CLINIC | Age: 84
End: 2024-07-23
Payer: MEDICARE

## 2024-07-23 VITALS
WEIGHT: 228 LBS | DIASTOLIC BLOOD PRESSURE: 50 MMHG | HEIGHT: 69 IN | HEART RATE: 79 BPM | BODY MASS INDEX: 33.77 KG/M2 | OXYGEN SATURATION: 98 % | SYSTOLIC BLOOD PRESSURE: 102 MMHG

## 2024-07-23 DIAGNOSIS — Z95.2 S/P TAVR (TRANSCATHETER AORTIC VALVE REPLACEMENT): Primary | ICD-10-CM

## 2024-07-23 DIAGNOSIS — E78.2 MIXED HYPERLIPIDEMIA: ICD-10-CM

## 2024-07-23 DIAGNOSIS — I10 ESSENTIAL HYPERTENSION: ICD-10-CM

## 2024-07-23 DIAGNOSIS — E78.00 HYPERCHOLESTEREMIA: ICD-10-CM

## 2024-07-23 PROCEDURE — 1123F ACP DISCUSS/DSCN MKR DOCD: CPT | Performed by: INTERNAL MEDICINE

## 2024-07-23 PROCEDURE — 99214 OFFICE O/P EST MOD 30 MIN: CPT | Performed by: INTERNAL MEDICINE

## 2024-07-23 PROCEDURE — 3078F DIAST BP <80 MM HG: CPT | Performed by: INTERNAL MEDICINE

## 2024-07-23 PROCEDURE — 3074F SYST BP LT 130 MM HG: CPT | Performed by: INTERNAL MEDICINE

## 2024-07-23 NOTE — PATIENT INSTRUCTIONS
Labs reviewed in epic and discussed with patient.  Medications reviewed in office. Medications refilled as warranted  Whenever you have dental procedures, cleanings, you need antibiotics before your visit.   Limit your fluid intake to 64oz (2 liters) daily.    Limit your salt intake to 2g a day.

## 2024-08-22 ENCOUNTER — TELEPHONE (OUTPATIENT)
Dept: CARDIOLOGY CLINIC | Age: 84
End: 2024-08-22

## 2024-08-22 RX ORDER — CEPHALEXIN 500 MG/1
CAPSULE ORAL
Qty: 4 CAPSULE | Refills: 0 | Status: SHIPPED | OUTPATIENT
Start: 2024-08-22

## 2024-08-22 NOTE — TELEPHONE ENCOUNTER
Preston Negrete MD  [facility] Staff13 minutes ago (4:26 PM)       I sent a prescription for Keflex ( he has taken it before) to [facility]  Needs to take 4 one hr before dental cleaning

## 2024-08-22 NOTE — TELEPHONE ENCOUNTER
Pt states he has a dental cleaning next week and needs an antibiotic sent to [facility] prior t cleaning. Please advise.

## 2024-08-22 NOTE — TELEPHONE ENCOUNTER
Noticed that the pt has an allergy to bactrim and penicillins. Called and spoke to pt, he could not tell me weather or not he can tolerate Amoxicillin. Pt stated he knows he has taken an abx in the past that he did tolerate but could not tell me what it was. PAULINE ODONTRELL, RKG please advise.     Pt had TAVR 5/7/24

## 2024-09-03 ENCOUNTER — OFFICE VISIT (OUTPATIENT)
Dept: FAMILY MEDICINE CLINIC | Age: 84
End: 2024-09-03
Payer: MEDICARE

## 2024-09-03 VITALS
SYSTOLIC BLOOD PRESSURE: 118 MMHG | TEMPERATURE: 98.4 F | BODY MASS INDEX: 33.21 KG/M2 | HEART RATE: 60 BPM | HEIGHT: 69 IN | WEIGHT: 224.2 LBS | DIASTOLIC BLOOD PRESSURE: 60 MMHG

## 2024-09-03 DIAGNOSIS — E78.2 MIXED HYPERLIPIDEMIA: ICD-10-CM

## 2024-09-03 DIAGNOSIS — E11.9 TYPE 2 DIABETES MELLITUS WITHOUT COMPLICATION, WITHOUT LONG-TERM CURRENT USE OF INSULIN (HCC): Primary | ICD-10-CM

## 2024-09-03 DIAGNOSIS — I50.22 CHRONIC SYSTOLIC (CONGESTIVE) HEART FAILURE (HCC): ICD-10-CM

## 2024-09-03 DIAGNOSIS — I10 ESSENTIAL HYPERTENSION: ICD-10-CM

## 2024-09-03 LAB — HBA1C MFR BLD: 5.7 %

## 2024-09-03 PROCEDURE — 3078F DIAST BP <80 MM HG: CPT | Performed by: FAMILY MEDICINE

## 2024-09-03 PROCEDURE — 3044F HG A1C LEVEL LT 7.0%: CPT | Performed by: FAMILY MEDICINE

## 2024-09-03 PROCEDURE — 83036 HEMOGLOBIN GLYCOSYLATED A1C: CPT | Performed by: FAMILY MEDICINE

## 2024-09-03 PROCEDURE — G2211 COMPLEX E/M VISIT ADD ON: HCPCS | Performed by: FAMILY MEDICINE

## 2024-09-03 PROCEDURE — 99214 OFFICE O/P EST MOD 30 MIN: CPT | Performed by: FAMILY MEDICINE

## 2024-09-03 PROCEDURE — 1123F ACP DISCUSS/DSCN MKR DOCD: CPT | Performed by: FAMILY MEDICINE

## 2024-09-03 PROCEDURE — 3074F SYST BP LT 130 MM HG: CPT | Performed by: FAMILY MEDICINE

## 2024-09-03 ASSESSMENT — ANXIETY QUESTIONNAIRES
6. BECOMING EASILY ANNOYED OR IRRITABLE: NOT AT ALL
IF YOU CHECKED OFF ANY PROBLEMS ON THIS QUESTIONNAIRE, HOW DIFFICULT HAVE THESE PROBLEMS MADE IT FOR YOU TO DO YOUR WORK, TAKE CARE OF THINGS AT HOME, OR GET ALONG WITH OTHER PEOPLE: NOT DIFFICULT AT ALL
5. BEING SO RESTLESS THAT IT IS HARD TO SIT STILL: NOT AT ALL
2. NOT BEING ABLE TO STOP OR CONTROL WORRYING: NOT AT ALL
1. FEELING NERVOUS, ANXIOUS, OR ON EDGE: NOT AT ALL
4. TROUBLE RELAXING: NOT AT ALL
3. WORRYING TOO MUCH ABOUT DIFFERENT THINGS: NOT AT ALL
GAD7 TOTAL SCORE: 0
7. FEELING AFRAID AS IF SOMETHING AWFUL MIGHT HAPPEN: NOT AT ALL

## 2024-09-03 ASSESSMENT — PATIENT HEALTH QUESTIONNAIRE - PHQ9
SUM OF ALL RESPONSES TO PHQ QUESTIONS 1-9: 0
2. FEELING DOWN, DEPRESSED OR HOPELESS: NOT AT ALL
SUM OF ALL RESPONSES TO PHQ QUESTIONS 1-9: 0
SUM OF ALL RESPONSES TO PHQ QUESTIONS 1-9: 0
1. LITTLE INTEREST OR PLEASURE IN DOING THINGS: NOT AT ALL
SUM OF ALL RESPONSES TO PHQ QUESTIONS 1-9: 0
SUM OF ALL RESPONSES TO PHQ9 QUESTIONS 1 & 2: 0

## 2024-09-03 NOTE — PROGRESS NOTES
Anup Del Cid is a 83 y.o. male    Chief Complaint   Patient presents with    6 Month Follow-Up    Diabetes    Hyperlipidemia    Hypertension       HPI:    Diabetes  He presents for his follow-up diabetic visit. He has type 2 diabetes mellitus. His disease course has been stable. Pertinent negatives for hypoglycemia include no dizziness. Pertinent negatives for diabetes include no polydipsia. Risk factors for coronary artery disease include male sex, diabetes mellitus and hypertension. Current diabetic treatment includes oral agent (monotherapy). An ACE inhibitor/angiotensin II receptor blocker is being taken.   Hypertension  This is a chronic problem. The current episode started more than 1 year ago. The problem is unchanged. The problem is controlled. Risk factors for coronary artery disease include diabetes mellitus. Past treatments include ACE inhibitors, beta blockers and diuretics. The current treatment provides significant improvement. There are no compliance problems.    Hyperlipidemia  This is a chronic problem. The current episode started more than 1 year ago. The problem is controlled. Recent lipid tests were reviewed and are normal. Exacerbating diseases include diabetes. Pertinent negatives include no myalgias. Current antihyperlipidemic treatment includes statins. The current treatment provides significant improvement of lipids. There are no compliance problems.  Risk factors for coronary artery disease include diabetes mellitus, hypertension, male sex and obesity.       ROS:    Review of Systems   Endocrine: Negative for polydipsia.   Musculoskeletal:  Negative for myalgias.   Neurological:  Negative for dizziness.       /60 (Site: Left Upper Arm, Position: Sitting, Cuff Size: Medium Adult)   Pulse 60   Temp 98.4 °F (36.9 °C) (Oral)   Ht 1.74 m (5' 8.5\")   Wt 101.7 kg (224 lb 3.2 oz)   BMI 33.59 kg/m²     Physical Exam:    Physical Exam  Constitutional:       General: He is not in acute

## 2024-09-03 NOTE — PROGRESS NOTES
\"Have you been to the ER, urgent care clinic since your last visit?  Hospitalized since your last visit?\"    NO    “Have you seen or consulted any other health care providers outside of [facility] since your last visit?”    NO            Click Here for Release of Records Request

## 2024-09-17 ENCOUNTER — NURSE ONLY (OUTPATIENT)
Dept: CARDIOLOGY CLINIC | Age: 84
End: 2024-09-17

## 2024-09-17 DIAGNOSIS — Z95.0 PACEMAKER: ICD-10-CM

## 2024-09-23 ENCOUNTER — OFFICE VISIT (OUTPATIENT)
Dept: CARDIOLOGY CLINIC | Age: 84
End: 2024-09-23
Payer: MEDICARE

## 2024-09-23 VITALS
WEIGHT: 223 LBS | DIASTOLIC BLOOD PRESSURE: 80 MMHG | HEIGHT: 69 IN | SYSTOLIC BLOOD PRESSURE: 124 MMHG | OXYGEN SATURATION: 96 % | BODY MASS INDEX: 33.03 KG/M2 | HEART RATE: 61 BPM

## 2024-09-23 DIAGNOSIS — I48.91 ATRIAL FIBRILLATION, UNSPECIFIED TYPE (HCC): Primary | ICD-10-CM

## 2024-09-23 DIAGNOSIS — R00.1 BRADYCARDIA: ICD-10-CM

## 2024-09-23 PROCEDURE — 3079F DIAST BP 80-89 MM HG: CPT

## 2024-09-23 PROCEDURE — 1123F ACP DISCUSS/DSCN MKR DOCD: CPT

## 2024-09-23 PROCEDURE — 93000 ELECTROCARDIOGRAM COMPLETE: CPT

## 2024-09-23 PROCEDURE — 99214 OFFICE O/P EST MOD 30 MIN: CPT

## 2024-09-23 PROCEDURE — 3074F SYST BP LT 130 MM HG: CPT

## 2024-09-26 PROCEDURE — 93296 REM INTERROG EVL PM/IDS: CPT | Performed by: INTERNAL MEDICINE

## 2024-09-26 PROCEDURE — 93294 REM INTERROG EVL PM/LDLS PM: CPT | Performed by: INTERNAL MEDICINE

## 2024-12-17 ENCOUNTER — NURSE ONLY (OUTPATIENT)
Dept: CARDIOLOGY CLINIC | Age: 84
End: 2024-12-17

## 2024-12-17 ENCOUNTER — TELEPHONE (OUTPATIENT)
Dept: CARDIOLOGY CLINIC | Age: 84
End: 2024-12-17

## 2024-12-17 DIAGNOSIS — Z95.0 PACEMAKER: ICD-10-CM

## 2024-12-17 DIAGNOSIS — Z95.2 HISTORY OF TRANSCATHETER AORTIC VALVE REPLACEMENT (TAVR): Primary | ICD-10-CM

## 2024-12-17 NOTE — TELEPHONE ENCOUNTER
Hello, can you call pt and schedule him for his 1 year echo close to or same day as his OV with NPKK on 6/16/25? THanks, Ml GRAHAM

## 2024-12-17 NOTE — TELEPHONE ENCOUNTER
You need to clarify procedure. Is he just having cystoscopy? If yes, then he will be considered intermediate risk clinically for lower risk procedure. Proceed as planned. OK to hold eliquis for 4 days prior.

## 2024-12-17 NOTE — TELEPHONE ENCOUNTER
CARDIAC CLEARANCE REQUEST    What type of procedure are you having:  Prostate procedure (pt doesn't know the name of it)  Are you taking any blood thinners:  Eliquis - hold for 4 days prior  Type on anesthesia:  Pt doesn`t know  When is your procedure scheduled for:  1.25.2025  What physician is performing your procedure:  Dr Leone  Phone Number:  [phone]  Fax number to send the letter:   [phone]    Last ov: 7.23.2024 SMM  Next ov: 6mon f/u from 7.23.2024

## 2024-12-18 NOTE — TELEPHONE ENCOUNTER
[facility] stated that pt has not seen Dr. Leone yet and that there is no procedure at this time. His initial appointment with Dr. Leone is 1/21/25.

## 2024-12-18 NOTE — TELEPHONE ENCOUNTER
OK. Then wait until seen by urologist and then message me back with exact procedure and I will give risk stratification at that time.

## 2024-12-18 NOTE — TELEPHONE ENCOUNTER
Called office to clarify what procedure that he is having done.  They couldn't find anything related to a procedure. They are routing a note to the doc and will get back with us.

## 2025-02-17 ENCOUNTER — OFFICE VISIT (OUTPATIENT)
Dept: FAMILY MEDICINE CLINIC | Age: 85
End: 2025-02-17
Payer: MEDICARE

## 2025-02-17 VITALS
SYSTOLIC BLOOD PRESSURE: 104 MMHG | OXYGEN SATURATION: 96 % | HEIGHT: 69 IN | HEART RATE: 72 BPM | WEIGHT: 234 LBS | BODY MASS INDEX: 34.66 KG/M2 | DIASTOLIC BLOOD PRESSURE: 54 MMHG

## 2025-02-17 DIAGNOSIS — M65.342 ACQUIRED TRIGGER FINGER OF LEFT RING FINGER: Primary | ICD-10-CM

## 2025-02-17 DIAGNOSIS — E66.01 MORBID (SEVERE) OBESITY DUE TO EXCESS CALORIES: ICD-10-CM

## 2025-02-17 PROCEDURE — G2211 COMPLEX E/M VISIT ADD ON: HCPCS | Performed by: FAMILY MEDICINE

## 2025-02-17 PROCEDURE — 3078F DIAST BP <80 MM HG: CPT | Performed by: FAMILY MEDICINE

## 2025-02-17 PROCEDURE — 1159F MED LIST DOCD IN RCRD: CPT | Performed by: FAMILY MEDICINE

## 2025-02-17 PROCEDURE — 1123F ACP DISCUSS/DSCN MKR DOCD: CPT | Performed by: FAMILY MEDICINE

## 2025-02-17 PROCEDURE — 3074F SYST BP LT 130 MM HG: CPT | Performed by: FAMILY MEDICINE

## 2025-02-17 PROCEDURE — 99213 OFFICE O/P EST LOW 20 MIN: CPT | Performed by: FAMILY MEDICINE

## 2025-02-17 SDOH — ECONOMIC STABILITY: FOOD INSECURITY: WITHIN THE PAST 12 MONTHS, YOU WORRIED THAT YOUR FOOD WOULD RUN OUT BEFORE YOU GOT MONEY TO BUY MORE.: NEVER TRUE

## 2025-02-17 SDOH — ECONOMIC STABILITY: FOOD INSECURITY: WITHIN THE PAST 12 MONTHS, THE FOOD YOU BOUGHT JUST DIDN'T LAST AND YOU DIDN'T HAVE MONEY TO GET MORE.: NEVER TRUE

## 2025-02-17 ASSESSMENT — PATIENT HEALTH QUESTIONNAIRE - PHQ9: DEPRESSION UNABLE TO ASSESS: PT REFUSES

## 2025-02-17 NOTE — PROGRESS NOTES
Anup Del Cid is a 84 y.o. male    Chief Complaint   Patient presents with    Swelling     Left hand ring finger swelling 2wks       HPI:    Swelling  Chronicity: left hand ring swelling. The current episode started 1 to 4 weeks ago. The problem occurs daily. The problem has been gradually worsening. The symptoms are aggravated by bending. He has tried nothing for the symptoms.           ROS:    Review of Systems    BP (!) 104/54 (Site: Right Upper Arm, Position: Sitting, Cuff Size: Medium Adult)   Pulse 72   Ht 1.74 m (5' 8.5\")   Wt 106.1 kg (234 lb)   SpO2 96%   BMI 35.06 kg/m²     Physical Exam:    Physical Exam  Constitutional:       General: He is not in acute distress.     Appearance: Normal appearance. He is obese. He is not ill-appearing or toxic-appearing.   HENT:      Head: Normocephalic.   Neurological:      Mental Status: He is alert.   Psychiatric:         Mood and Affect: Mood normal.         Behavior: Behavior normal.         Thought Content: Thought content normal.         Current Outpatient Medications   Medication Sig Dispense Refill    metFORMIN (GLUCOPHAGE) 500 MG tablet Take 1 tablet by mouth 2 times daily (with meals) 180 tablet 3    clopidogrel (PLAVIX) 75 MG tablet Take 1 tablet by mouth daily 90 tablet 3    carvedilol (COREG) 12.5 MG tablet Take 1 tablet by mouth 2 times daily (with meals) 180 tablet 3    furosemide (LASIX) 40 MG tablet Take 1 tablet by mouth daily 90 tablet 3    apixaban (ELIQUIS) 5 MG TABS tablet TAKE 1 TABLET TWICE A  tablet 3    atorvastatin (LIPITOR) 40 MG tablet TAKE 1 TABLET BY MOUTH EVERY DAY 90 tablet 3    lisinopril (PRINIVIL;ZESTRIL) 20 MG tablet TAKE 1/2 TABLET BY MOUTH EVERY DAY 45 tablet 3    Multiple Vitamins-Minerals (THERAPEUTIC MULTIVITAMIN-MINERALS) tablet Take 1 tablet by mouth daily      cephALEXin (KEFLEX) 500 MG capsule Take 4 capsules one hour prior to dental procedure/ cleaning (Patient not taking: Reported on 9/23/2024) 4 capsule 0

## 2025-02-26 ENCOUNTER — OFFICE VISIT (OUTPATIENT)
Dept: ORTHOPEDIC SURGERY | Age: 85
End: 2025-02-26

## 2025-02-26 VITALS — WEIGHT: 234 LBS | HEIGHT: 70 IN | BODY MASS INDEX: 33.5 KG/M2

## 2025-02-26 DIAGNOSIS — M65.342 TRIGGER RING FINGER OF LEFT HAND: Primary | ICD-10-CM

## 2025-02-26 RX ORDER — LIDOCAINE HYDROCHLORIDE 10 MG/ML
0.5 INJECTION, SOLUTION INFILTRATION; PERINEURAL ONCE
Status: COMPLETED | OUTPATIENT
Start: 2025-02-26 | End: 2025-02-26

## 2025-02-26 RX ORDER — BETAMETHASONE SODIUM PHOSPHATE AND BETAMETHASONE ACETATE 3; 3 MG/ML; MG/ML
3 INJECTION, SUSPENSION INTRA-ARTICULAR; INTRALESIONAL; INTRAMUSCULAR; SOFT TISSUE ONCE
Status: COMPLETED | OUTPATIENT
Start: 2025-02-26 | End: 2025-02-26

## 2025-02-26 RX ADMIN — BETAMETHASONE SODIUM PHOSPHATE AND BETAMETHASONE ACETATE 3 MG: 3; 3 INJECTION, SUSPENSION INTRA-ARTICULAR; INTRALESIONAL; INTRAMUSCULAR; SOFT TISSUE at 14:01

## 2025-02-26 RX ADMIN — LIDOCAINE HYDROCHLORIDE 0.5 ML: 10 INJECTION, SOLUTION INFILTRATION; PERINEURAL at 14:02

## 2025-02-26 NOTE — PROGRESS NOTES
Hand, Upper Extremity and Reconstructive Surgery                Tammy Coleman MD                                             History of Present Illness  The patient is an 84-year-old right-hand dominant male presenting with symptoms of right ring finger triggering.  The onset of symptoms occurred approximately three weeks ago without any identifiable precipitating event. He retains partial mobility of the affected finger but experiences pain upon full flexion, with the finger locking in a flexed position. The patient reports no prior issues with this finger. The pain is described as consistent throughout the day. The patient has no history of diabetes mellitus and is currently on Eliquis for cardiac management.    MEDICATIONS  Eliquis     Occupation: Retired                                                                                        Current Outpatient Medications   Medication Instructions    apixaban (ELIQUIS) 5 MG TABS tablet TAKE 1 TABLET TWICE A DAY    atorvastatin (LIPITOR) 40 MG tablet TAKE 1 TABLET BY MOUTH EVERY DAY    carvedilol (COREG) 12.5 mg, Oral, 2 TIMES DAILY WITH MEALS    cephALEXin (KEFLEX) 500 MG capsule Take 4 capsules one hour prior to dental procedure/ cleaning    clopidogrel (PLAVIX) 75 mg, Oral, DAILY    furosemide (LASIX) 40 mg, Oral, DAILY    lisinopril (PRINIVIL;ZESTRIL) 20 MG tablet TAKE 1/2 TABLET BY MOUTH EVERY DAY    metFORMIN (GLUCOPHAGE) 500 mg, Oral, 2 TIMES DAILY WITH MEALS    Multiple Vitamins-Minerals (THERAPEUTIC MULTIVITAMIN-MINERALS) tablet 1 tablet, Oral, DAILY       Past Medical History:   Diagnosis Date    Arthritis     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

## 2025-03-03 ENCOUNTER — OFFICE VISIT (OUTPATIENT)
Dept: FAMILY MEDICINE CLINIC | Age: 85
End: 2025-03-03

## 2025-03-03 VITALS
WEIGHT: 231 LBS | BODY MASS INDEX: 33.07 KG/M2 | DIASTOLIC BLOOD PRESSURE: 58 MMHG | OXYGEN SATURATION: 97 % | HEART RATE: 80 BPM | HEIGHT: 70 IN | SYSTOLIC BLOOD PRESSURE: 116 MMHG

## 2025-03-03 DIAGNOSIS — E55.9 VITAMIN D DEFICIENCY: ICD-10-CM

## 2025-03-03 DIAGNOSIS — I10 ESSENTIAL HYPERTENSION: ICD-10-CM

## 2025-03-03 DIAGNOSIS — Z12.5 SCREENING PSA (PROSTATE SPECIFIC ANTIGEN): ICD-10-CM

## 2025-03-03 DIAGNOSIS — E78.2 MIXED HYPERLIPIDEMIA: ICD-10-CM

## 2025-03-03 DIAGNOSIS — I48.91 ATRIAL FIBRILLATION, UNSPECIFIED TYPE (HCC): ICD-10-CM

## 2025-03-03 DIAGNOSIS — E11.9 TYPE 2 DIABETES MELLITUS WITHOUT COMPLICATION, WITHOUT LONG-TERM CURRENT USE OF INSULIN (HCC): ICD-10-CM

## 2025-03-03 DIAGNOSIS — I50.22 CHRONIC SYSTOLIC (CONGESTIVE) HEART FAILURE (HCC): ICD-10-CM

## 2025-03-03 DIAGNOSIS — Z00.00 PREVENTATIVE HEALTH CARE: Primary | ICD-10-CM

## 2025-03-03 LAB — HBA1C MFR BLD: 5.5 %

## 2025-03-03 ASSESSMENT — PATIENT HEALTH QUESTIONNAIRE - PHQ9: DEPRESSION UNABLE TO ASSESS: PT REFUSES

## 2025-03-03 NOTE — PROGRESS NOTES
current use of insulin (HCC)    3. Essential hypertension    4. Mixed hyperlipidemia    5. Chronic systolic (congestive) heart failure (HCC)    6. Atrial fibrillation, unspecified type (HCC)    7. Screening PSA (prostate specific antigen)    8. Vitamin D deficiency        Plan:    Preventative health care     1.  Type 2 diabetes mellitus without complication, without long-term current use of insulin (HCC)  Stable. Continue current medications.   - POCT glycosylated hemoglobin (Hb A1C) 5.5  - metFORMIN (GLUCOPHAGE) 500 MG tablet; Take 1 tablet by mouth 2 times daily (with meals)  Dispense: 180 tablet; Refill: 3  - CBC Auto Differential  - Comprehensive Metabolic Panel    2. Essential hypertension  Stable. Continue current medications.   - lisinopril (PRINIVIL;ZESTRIL) 20 MG tablet; Take 1 tablet by mouth daily  Dispense: 90 tablet; Refill: 3  - CBC Auto Differential  - Comprehensive Metabolic Panel    3. Mixed hyperlipidemia  Stable. Continue current medications.   - atorvastatin (LIPITOR) 40 MG tablet; Take 1 tablet by mouth daily  Dispense: 90 tablet; Refill: 3  - CBC Auto Differential  - Comprehensive Metabolic Panel  - Lipid Panel    4. Chronic systolic (congestive) heart failure (HCC)  Stable. Continue current medications.     5.  Atrial fibrillation, unspecified type (HCC)  Stable. Continue current medications.     Return in about 6 months (around 9/3/2025) for Diabetes, Hypertension, Hyperlipidemia.

## 2025-03-04 LAB
25(OH)D3 SERPL-MCNC: 24.9 NG/ML
ALBUMIN SERPL-MCNC: 4.7 G/DL (ref 3.4–5)
ALBUMIN/GLOB SERPL: 1.9 {RATIO} (ref 1.1–2.2)
ALP SERPL-CCNC: 170 U/L (ref 40–129)
ALT SERPL-CCNC: 39 U/L (ref 10–40)
ANION GAP SERPL CALCULATED.3IONS-SCNC: 10 MMOL/L (ref 3–16)
AST SERPL-CCNC: 25 U/L (ref 15–37)
BASOPHILS # BLD: 0.1 K/UL (ref 0–0.2)
BASOPHILS NFR BLD: 0.9 %
BILIRUB SERPL-MCNC: 1.8 MG/DL (ref 0–1)
BUN SERPL-MCNC: 16 MG/DL (ref 7–20)
CALCIUM SERPL-MCNC: 9.9 MG/DL (ref 8.3–10.6)
CHLORIDE SERPL-SCNC: 96 MMOL/L (ref 99–110)
CHOLEST SERPL-MCNC: 147 MG/DL (ref 0–199)
CO2 SERPL-SCNC: 29 MMOL/L (ref 21–32)
CREAT SERPL-MCNC: 0.7 MG/DL (ref 0.8–1.3)
CREAT UR-MCNC: 27.7 MG/DL (ref 39–259)
DEPRECATED RDW RBC AUTO: 15.7 % (ref 12.4–15.4)
EOSINOPHIL # BLD: 0.3 K/UL (ref 0–0.6)
EOSINOPHIL NFR BLD: 3.5 %
GFR SERPLBLD CREATININE-BSD FMLA CKD-EPI: >90 ML/MIN/{1.73_M2}
GLUCOSE SERPL-MCNC: 90 MG/DL (ref 70–99)
HCT VFR BLD AUTO: 38.2 % (ref 40.5–52.5)
HDLC SERPL-MCNC: 48 MG/DL (ref 40–60)
HGB BLD-MCNC: 12.9 G/DL (ref 13.5–17.5)
LDLC SERPL CALC-MCNC: 83 MG/DL
LYMPHOCYTES # BLD: 1.6 K/UL (ref 1–5.1)
LYMPHOCYTES NFR BLD: 22 %
MCH RBC QN AUTO: 28.9 PG (ref 26–34)
MCHC RBC AUTO-ENTMCNC: 33.7 G/DL (ref 31–36)
MCV RBC AUTO: 85.7 FL (ref 80–100)
MICROALBUMIN UR DL<=1MG/L-MCNC: <1.2 MG/DL
MICROALBUMIN/CREAT UR: ABNORMAL MG/G (ref 0–30)
MONOCYTES # BLD: 0.4 K/UL (ref 0–1.3)
MONOCYTES NFR BLD: 5.6 %
NEUTROPHILS # BLD: 4.9 K/UL (ref 1.7–7.7)
NEUTROPHILS NFR BLD: 68 %
PLATELET # BLD AUTO: 215 K/UL (ref 135–450)
PMV BLD AUTO: 8.8 FL (ref 5–10.5)
POTASSIUM SERPL-SCNC: 3.8 MMOL/L (ref 3.5–5.1)
PROT SERPL-MCNC: 7.2 G/DL (ref 6.4–8.2)
PSA SERPL DL<=0.01 NG/ML-MCNC: 1.64 NG/ML (ref 0–4)
RBC # BLD AUTO: 4.46 M/UL (ref 4.2–5.9)
SODIUM SERPL-SCNC: 135 MMOL/L (ref 136–145)
TRIGL SERPL-MCNC: 78 MG/DL (ref 0–150)
TSH SERPL DL<=0.005 MIU/L-ACNC: 3.22 UIU/ML (ref 0.27–4.2)
VLDLC SERPL CALC-MCNC: 16 MG/DL
WBC # BLD AUTO: 7.2 K/UL (ref 4–11)

## 2025-03-17 DIAGNOSIS — I10 ESSENTIAL HYPERTENSION: ICD-10-CM

## 2025-03-17 RX ORDER — LISINOPRIL 20 MG/1
10 TABLET ORAL DAILY
Qty: 45 TABLET | Refills: 3 | Status: SHIPPED | OUTPATIENT
Start: 2025-03-17

## 2025-03-17 NOTE — TELEPHONE ENCOUNTER
Refill Request     CONFIRM preferred pharmacy with the patient.    If Mail Order Rx - Pend for 90 day refill.      Last Seen: Last Seen Department: 3/3/2025  Last Seen by PCP: 3/3/2025    Last Written: 3/18/2024    If no future appointment scheduled:  Review the last OV with PCP and review information for follow-up visit,  Route STAFF MESSAGE with patient name to the  Pool for scheduling with the following information:            -  Timing of next visit           -  Visit type ie Physical, OV, etc           -  Diagnoses/Reason ie. COPD, HTN - Do not use MEDICATION, Follow-up or CHECK UP - Give reason for visit      Next Appointment:   Future Appointments   Date Time Provider Department Center   3/18/2025  7:30 AM SCHEDULE, [place] REMOTE TRANSMISSION [facility]   4/9/2025  1:00 PM Tammy Coleman MD [facility]   6/17/2025  7:35 AM SCHEDULE, [place] REMOTE TRANSMISSION [place] [facility]   6/17/2025 11:00 AM [facility] CARDI ECHO 1 [facility]   6/19/2025  1:15 PM SCHEDULE, [place] DEVICE CHECK [facility]   6/19/2025  1:15 PM Leann Edouard, GORDON - CNP [place] [facility]   9/4/2025  1:30 PM Camilla Rios DO [facility] DEP       Message sent to  to schedule appt with patient?  NO      Requested Prescriptions     Pending Prescriptions Disp Refills    lisinopril (PRINIVIL;ZESTRIL) 20 MG tablet [Pharmacy Med Name: Lisinopril Oral Tablet 20 MG] 45 tablet 3     Sig: TAKE 1/2 TABLET BY MOUTH EVERY DAY

## 2025-03-18 ENCOUNTER — NURSE ONLY (OUTPATIENT)
Dept: CARDIOLOGY CLINIC | Age: 85
End: 2025-03-18

## 2025-03-18 DIAGNOSIS — Z95.0 PACEMAKER: ICD-10-CM

## 2025-03-19 DIAGNOSIS — E78.2 MIXED HYPERLIPIDEMIA: ICD-10-CM

## 2025-03-19 RX ORDER — ATORVASTATIN CALCIUM 40 MG/1
TABLET, FILM COATED ORAL
Qty: 90 TABLET | Refills: 0 | Status: SHIPPED | OUTPATIENT
Start: 2025-03-19

## 2025-03-20 NOTE — TELEPHONE ENCOUNTER
Last Office Visit: 7/23/24 Provider: PAULINE  **Is provider OOT? No    Next Office Visit: 6/26/2025 Provider: PAULINE  **If no OV, when does pt need to be seen? N/a  **Has patient already had 30 day supply? No    Lab orders needed? no   Encounter provider correct? Yes If not, change provider  Script changes since last refill? no    LAST LABS:   CBC:   Lab Results   Component Value Date    WBC 7.2 03/03/2025    HGB 12.9 (L) 03/03/2025    HCT 38.2 (L) 03/03/2025    MCV 85.7 03/03/2025     03/03/2025     Lipid:   Lab Results   Component Value Date    CHOL 147 03/03/2025    TRIG 78 03/03/2025    HDL 48 03/03/2025    LDL 83 03/03/2025    VLDL 16 03/03/2025            
Pt called office and asked for an update on if rx has been sent to pharmacy yet. I informed him that we are waiting on response from [facility]  
Pts pharmacy called for refills of Eliquis 5mg and Liptior 40mg. Preferred pharmacy is    [facility] - JOANNE Garcia - [facility] - P [phone]      Pharmacy also provided reference number of [id number] and phone number of [phone] option 2.    Last ov 07/23/2024 eduardo    
Wife informed and SB  
(4) no limitation

## 2025-03-30 PROCEDURE — 93294 REM INTERROG EVL PM/LDLS PM: CPT | Performed by: INTERNAL MEDICINE

## 2025-03-30 PROCEDURE — 93296 REM INTERROG EVL PM/IDS: CPT | Performed by: INTERNAL MEDICINE

## 2025-04-07 NOTE — TELEPHONE ENCOUNTER
Last OV: 24 NP Javan at [place]  Next OV: 06/15/25 NP Javan at [place]  Last Labs: 25  Last EK24   Last Fill:   furosemide (LASIX) 40 MG tablet 90 tablet 3 2024 --    Sig - Route: Take 1 tablet by mouth daily - Oral    Sent to pharmacy as: Furosemide 40 MG Oral Tablet (LASIX)    Cosign for Ordering: Accepted by Thor Mccormick MD on 2024  4:41 PM    E-Prescribing Status: Receipt confirmed by pharmacy (2024  2:42 PM EDT)

## 2025-04-08 RX ORDER — FUROSEMIDE 40 MG/1
40 TABLET ORAL DAILY
Qty: 90 TABLET | Refills: 0 | OUTPATIENT
Start: 2025-04-08

## 2025-04-09 ENCOUNTER — OFFICE VISIT (OUTPATIENT)
Dept: ORTHOPEDIC SURGERY | Age: 85
End: 2025-04-09

## 2025-04-09 VITALS — WEIGHT: 231 LBS | HEIGHT: 70 IN | BODY MASS INDEX: 33.07 KG/M2

## 2025-04-09 DIAGNOSIS — M65.342 TRIGGER RING FINGER OF LEFT HAND: Primary | ICD-10-CM

## 2025-04-09 RX ORDER — FUROSEMIDE 40 MG/1
40 TABLET ORAL DAILY
Qty: 90 TABLET | Refills: 3 | Status: SHIPPED | OUTPATIENT
Start: 2025-04-09

## 2025-04-09 RX ORDER — LIDOCAINE HYDROCHLORIDE 10 MG/ML
0.5 INJECTION, SOLUTION INFILTRATION; PERINEURAL ONCE
Status: COMPLETED | OUTPATIENT
Start: 2025-04-09 | End: 2025-04-09

## 2025-04-09 RX ORDER — BETAMETHASONE SODIUM PHOSPHATE AND BETAMETHASONE ACETATE 3; 3 MG/ML; MG/ML
3 INJECTION, SUSPENSION INTRA-ARTICULAR; INTRALESIONAL; INTRAMUSCULAR; SOFT TISSUE ONCE
Status: COMPLETED | OUTPATIENT
Start: 2025-04-09 | End: 2025-04-09

## 2025-04-09 RX ADMIN — BETAMETHASONE SODIUM PHOSPHATE AND BETAMETHASONE ACETATE 3 MG: 3; 3 INJECTION, SUSPENSION INTRA-ARTICULAR; INTRALESIONAL; INTRAMUSCULAR; SOFT TISSUE at 13:27

## 2025-04-09 RX ADMIN — LIDOCAINE HYDROCHLORIDE 0.5 ML: 10 INJECTION, SOLUTION INFILTRATION; PERINEURAL at 13:28

## 2025-04-09 NOTE — TELEPHONE ENCOUNTER
Last Office Visit: 7/23/24 Provider: PAULINE  **Is provider OOT? Yes    Next Office Visit: 6/26/25 Provider: PAULINE  **If no OV, when does pt need to be seen? N/a  **Has patient already had 30 day supply? No    Lab orders needed? no   Encounter provider correct? Yes If not, change provider  Script changes since last refill? no    LAST LABS:   CMP:   Lab Results   Component Value Date     (L) 03/03/2025    K 3.8 03/03/2025    CL 96 (L) 03/03/2025    CO2 29 03/03/2025    BUN 16 03/03/2025    CREATININE 0.7 (L) 03/03/2025    GLUCOSE 90 03/03/2025    CALCIUM 9.9 03/03/2025    BILITOT 1.8 (H) 03/03/2025    ALKPHOS 170 (H) 03/03/2025    AST 25 03/03/2025    ALT 39 03/03/2025    LABGLOM >90 03/03/2025    GFRAA >60 09/02/2022    AGRATIO 1.9 03/03/2025    GLOB 3.0 03/11/2021

## 2025-04-09 NOTE — PROGRESS NOTES
sheath with 0.5 cc   1% lidocaine and 0.5 cc of 6mg/ml Bethamethasone. Appropriate injection risks and instructions were discussed.      Follow-up in 2 months if still symptomatic    No orders of the defined types were placed in this encounter.      The patient (or guardian, if applicable) and other individuals in attendance with the patient were advised that Artificial Intelligence will be utilized during this visit to record, process the conversation to generate a clinical note, and support improvement of the AI technology. The patient (or guardian, if applicable) and other individuals in attendance at the appointment consented to the use of AI, including the recording.

## 2025-05-22 ENCOUNTER — HOSPITAL ENCOUNTER (EMERGENCY)
Age: 85
Discharge: HOME | End: 2025-05-22
Attending: EMERGENCY MEDICINE
Payer: MEDICARE

## 2025-05-22 VITALS
RESPIRATION RATE: 17 BRPM | OXYGEN SATURATION: 98 % | TEMPERATURE: 98.3 F | HEART RATE: 82 BPM | BODY MASS INDEX: 32.66 KG/M2 | SYSTOLIC BLOOD PRESSURE: 148 MMHG | DIASTOLIC BLOOD PRESSURE: 80 MMHG | WEIGHT: 227.6 LBS

## 2025-05-22 DIAGNOSIS — S51.811A SKIN TEAR OF FOREARM WITHOUT COMPLICATION, RIGHT, INITIAL ENCOUNTER: Primary | ICD-10-CM

## 2025-05-22 PROCEDURE — 90715 TDAP VACCINE 7 YRS/> IM: CPT | Performed by: EMERGENCY MEDICINE

## 2025-05-22 PROCEDURE — 90471 IMMUNIZATION ADMIN: CPT | Performed by: EMERGENCY MEDICINE

## 2025-05-22 PROCEDURE — 6360000002 HC RX W HCPCS: Performed by: EMERGENCY MEDICINE

## 2025-05-22 PROCEDURE — 99284 EMERGENCY DEPT VISIT MOD MDM: CPT

## 2025-05-22 RX ADMIN — TETANUS TOXOID, REDUCED DIPHTHERIA TOXOID AND ACELLULAR PERTUSSIS VACCINE, ADSORBED 0.5 ML: 5; 2.5; 8; 8; 2.5 SUSPENSION INTRAMUSCULAR at 17:38

## 2025-05-22 ASSESSMENT — PAIN - FUNCTIONAL ASSESSMENT: PAIN_FUNCTIONAL_ASSESSMENT: 0-10

## 2025-05-22 ASSESSMENT — PAIN SCALES - GENERAL: PAINLEVEL_OUTOF10: 0

## 2025-05-22 NOTE — ED NOTES
Surgifoam placed on pt right wrist where there was a small puncture wound. Pt and spouse given instructions for at home care and verbalized understanding. Instructed wife on donning on and off the bandage. All questions were answered and both patient and wife verbalized understanding.

## 2025-05-23 ENCOUNTER — CARE COORDINATION (OUTPATIENT)
Dept: CARE COORDINATION | Age: 85
End: 2025-05-23

## 2025-05-23 ENCOUNTER — TELEPHONE (OUTPATIENT)
Dept: FAMILY MEDICINE CLINIC | Age: 85
End: 2025-05-23

## 2025-05-23 DIAGNOSIS — I50.22 CHRONIC SYSTOLIC (CONGESTIVE) HEART FAILURE (HCC): Primary | ICD-10-CM

## 2025-05-23 DIAGNOSIS — I10 ESSENTIAL HYPERTENSION: ICD-10-CM

## 2025-05-23 DIAGNOSIS — E11.9 TYPE 2 DIABETES MELLITUS WITHOUT COMPLICATION, WITHOUT LONG-TERM CURRENT USE OF INSULIN (HCC): ICD-10-CM

## 2025-05-23 PROCEDURE — 1111F DSCHRG MED/CURRENT MED MERGE: CPT | Performed by: FAMILY MEDICINE

## 2025-05-23 NOTE — CARE COORDINATION
Problem: Feeding Intolerance (Enteral Nutrition)  Goal: Feeding Tolerance  Intervention: Prevent and Manage Feeding Intolerance  Flowsheets (Taken 8/3/2022 1206)  Nutrition Support Management: tube feeding initiated     Problem: Malnutrition - Severe  Goal: Improved Nutritional Intake  Intervention: Promote and Optimize Nutrition Support  Flowsheets (Taken 8/3/2022 1208)  Nutrition Support Management: tube feeding initiated      Follow up call placed to review again w/pt timeline of Rx lasix.  Pt maintains he just ran out of lasix just 2 weeks ago; he does not recall getting most recent Rx filled in April or this month. Pt says he will check w/family to ascertain certainty no one else has picked up Rx for him, as pharmacy will not fill.   Cautioned pt in going without Rx lasix - discussed potential for fluid overload which would increase workload of heart.  Pt maintains he has had no noted s/s to indicate concern, weight holding steady at 227 lbs.    Unable to reach anyone on pharmacy phone. Pt will continue to look into & will call direct to cardiology and/or pharmacy as appropriate.  No further ACC outreach planned (per pt preference - declines need/want for further ACC outreach)

## 2025-05-23 NOTE — CARE COORDINATION
Ambulatory Care Coordination Note     2025 2:55 PM     Patient Current Location:  Home: [address]  [place] [address]     This patient was received as a referral from [facility] health report .    Interim ACM  contacted the patient by telephone. Verified name and  with patient and spouse/partner as identifiers. Provided introduction to self, and explanation of the ACM role.   Patient respectfully declined care management services at this time.          ACM: Radha Gonzalez RN     Challenges to be reviewed by the provider   Additional needs identified to be addressed with provider Yes  ED follow up visit recommended - pt respectfully declines requiring assistance w/scheduling; states preference to self outreach direct to PCP for scheduling as appropriate    Of note - not taking Rx lasix for past 2 weeks, pt reports,  \"they won't refill it\"     Denies any weight gain, or edema, no SOB....  Interim ACM emphasized importance in timely f/u for review of meds & ED f/u       Method of communication with provider: chart routing.    Utilization: Initial Call - Discharge Date: 25   Discharge Facility: [facility]  Reason for ED Visit: laceration  Visit Diagnosis: skin tear forearm (right) without complication    Number of ED visits in the last 6 months: 1      Do you have any ongoing symptoms? No  Did you call your PCP prior to going to the ED? No, did not call the PCP office.     Review of Discharge Instructions:   [x] AVS discharge instructions  [x] Right Care, Right Place, Right Time document  [x] Medication changes  [x] Follow up appointments  [x] Referral follow up - advised rationale/importance in timely ED f/u with provider(s)  [x] ACC - respectfully declines enrollment       Care Summary Note: ACC outreach s/p ED visit. Pt respectfully declines enrollment for ongoing ACC following review, but verbalized appreciation for this outreach.  Extended open invitation to pt to request

## 2025-05-23 NOTE — TELEPHONE ENCOUNTER
Called and left  for patient to call back. We have a report that he was in the ED on 5/22 and just calling to get him scheduled for a ED follow up with Dr. Rios. Thanks!

## 2025-05-24 NOTE — ED PROVIDER NOTES
Emergency Department Provider Note  Location: [facility] EMERGENCY DEPARTMENT  5/22/2025     Patient Identification  Anup Del Cid is a 84 y.o. male    Chief Complaint  Laceration (Patient was putting his umbrella up over the weekend and cut his right forearm. Reports he can't get the bleeding to stop. )          HPI  (History provided by patient)  Patient is an 84-year-old male who presents for wound evaluation.  Reports he had a skin tear/abrasion to his right forearm that occurred a few days ago.  Since then it has had intermittent oozing bleeding.  He has bandaged it but every time he takes the bandage off starts oozing again.  No significant redness or tenderness or pain.  He is on Eliquis.      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    Past surgical history:  has a past surgical history that includes Prostate surgery; Tonsillectomy; Knee arthroscopy; knee surgery (Left, 1963); Colonoscopy (9/24/14); joint replacement (Left, 9/1/2015); Cataract removal (Bilateral, 03/2017); Total hip arthroplasty (Left, 12/13/2021); Cardiac procedure (N/A, 5/7/2024); and Cardiac surgery (N/A, 5/7/2024).    Home medications:   Prior to Admission medications    Medication Sig Start Date End Date Taking? Authorizing Provider   furosemide (LASIX) 40 MG tablet Take 1 tablet by mouth daily  Patient not taking: Reported on 5/23/2025 4/9/25   Preston Negrete MD   apixaban (ELIQUIS) 5 MG TABS tablet TAKE 1 TABLET TWICE A DAY 3/19/25   Ramirez Santana MD         Clinical Impression:  1. Skin tear of forearm without complication, right, initial encounter          Disposition:  Discharge to home in good condition.    Blood pressure (!) 148/80, pulse 82, temperature 98.3 °F (36.8 °C), temperature source Oral, resp. rate 17, weight 103.2 kg (227 lb 9.6 oz), SpO2 98%.    Patient was given scripts for the following medications. I counseled patient how to take these medications.   Discharge Medication List as of 5/22/2025  5:38 PM        Discharge Medication List as of 5/22/2025  5:38 PM          Disposition referral (if applicable):  Camilla Rios,   [address]  [address]  [facility] [address] [phone]    Schedule an appointment as soon as possible for a visit   As needed    [facility] Emergency Department  [address]  [facility] [address] [phone]  Go to   As needed, If symptoms worsen      IBarrett, am the primary attending of record and contributed the majority of evaluation and treatment of emergent care for this encounter.     Total critical care time is 0 minutes, which excludes separately billable procedures and updating family. Time spent is specifically for management of the presenting complaint and symptoms initially, direct bedside care, reevaluation, review of records, and consultation.  There was a high probability of clinically significant life-threatening deterioration in the patient's condition, which required my urgent intervention.     This chart was generated in part by using Dragon Dictation system and may contain errors related to that system including errors in grammar, punctuation, and spelling, as well as words and phrases that may be inappropriate. If there are any questions or concerns please feel free to contact the dictating provider for clarification.     Barrett Hancock MD   [employer]        Barrett Hancock MD  05/23/25 [id number]

## 2025-06-10 ENCOUNTER — OFFICE VISIT (OUTPATIENT)
Dept: ORTHOPEDIC SURGERY | Age: 85
End: 2025-06-10
Payer: MEDICARE

## 2025-06-10 VITALS — HEIGHT: 70 IN | WEIGHT: 227 LBS | BODY MASS INDEX: 32.5 KG/M2

## 2025-06-10 DIAGNOSIS — M65.342 TRIGGER RING FINGER OF LEFT HAND: Primary | ICD-10-CM

## 2025-06-10 PROCEDURE — 1159F MED LIST DOCD IN RCRD: CPT | Performed by: STUDENT IN AN ORGANIZED HEALTH CARE EDUCATION/TRAINING PROGRAM

## 2025-06-10 PROCEDURE — 99213 OFFICE O/P EST LOW 20 MIN: CPT | Performed by: STUDENT IN AN ORGANIZED HEALTH CARE EDUCATION/TRAINING PROGRAM

## 2025-06-10 PROCEDURE — 1123F ACP DISCUSS/DSCN MKR DOCD: CPT | Performed by: STUDENT IN AN ORGANIZED HEALTH CARE EDUCATION/TRAINING PROGRAM

## 2025-06-10 PROCEDURE — 1125F AMNT PAIN NOTED PAIN PRSNT: CPT | Performed by: STUDENT IN AN ORGANIZED HEALTH CARE EDUCATION/TRAINING PROGRAM

## 2025-06-10 NOTE — PROGRESS NOTES
Hypertension     Nocturia     Pre-diabetes     Prostate CA (HCC)     seeds implanted approx 2004       Past Surgical History:   Procedure Laterality Date    CARDIAC PROCEDURE N/A 5/7/2024    TRANSCATHETER AORTIC VALVE REPLACEMENT (CATH) performed by Thor Mccormick MD at [facility] CARDIAC CATH LAB    CARDIAC SURGERY N/A 5/7/2024    TRANSCATHETER AORTIC VALVE REPLACEMENT FEMORAL APPROACH performed by Odessa Velazquez MD at [facility] CARDIAC CATH LAB    CATARACT REMOVAL Bilateral 03/2017    COLONOSCOPY  9/24/14    multiple polyp    JOINT REPLACEMENT Left 9/1/2015    left total knee replacement    KNEE ARTHROSCOPY      left knee    KNEE SURGERY Left 1963    PROSTATE SURGERY      TONSILLECTOMY      TOTAL HIP ARTHROPLASTY Left 12/13/2021    LEFT TOTAL HIP ARTHROPLASTY MINIMALLY INVASIVE DIRECT ANTERIOR WITH FASCIAILIACA BLOCK FOR PAIN CONTROL                        SAVI BIOMET performed by Adelso Ziegler MD at [facility] OR       Social History     Occupational History    Not on file   Tobacco Use    Smoking status: Never    Smokeless tobacco: Never   Vaping Use    Vaping status: Never Used   Substance and Sexual Activity    Alcohol use: Yes     Comment: 2-3 glasses of wine daily    Drug use: No    Sexual activity: Not Currently     Physical Exam      Left hand exam- resolved tenderness palpation over the ring finger A1 pulley, no triggering of the ring finger with flexion.  There is a 20 degree flexion contracture of the PIP joint of the ring finger which is passively correctable    Assessment & Plan  1.  Left ring finger trigger finger.  Trigger finger symptoms have resolved.  However, patient is developing stiffness of the ring finger likely secondary to lack of use.  Hand exercises were provided.  Occupational Therapy order was provided.  Patient can take Tylenol or ibuprofen for pain as needed.  Patient will follow-up with me in 6 weeks.       No orders of the defined types were placed in this encounter.      The patient

## 2025-06-16 NOTE — PROGRESS NOTES
[facility]   Cardiac Follow Up    Referring Provider:  Camilla Rios DO   Originally saw 1/20  C/O baseline TELLEZ today     Subjective: Anup Del Cid presents for cardiology follow up AS, afib, HTN, HLD; c/o swelling in legs today    History of Present Illness:    Anup Del Cid is a 84 y.o. 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 Alberts, arthritis, s/p left THR 12/21, gout, DM, HTN, HLD, moderate AS, s/p L THR 12/21, chronic LE edema, and prostate cancer s/p radiation seeds. Rocio nuc 8/15 normal. ECHO 4/12/22 EF 55% and moderate AS (velocity is 3.33m/s and MPG 25mmHg). SUKHDEV 9/28/23 to 10/12/23 - predominately AF average HR of 46 (). PVC burden 3.60%   Admitted to [facility] 12/19-12/20/23 due to bradycardia. s/p leadless Micra implant on 12/19/2023 with Dr Alberts.   Limited Echo 2/22/24 EF=50%; severe LVH, severe AS with peak velocity 3.86 m/s, MPG of 33 mmHg, and CLAUDY by planimetry of 0.63 cm2. Mod MR and TR. He had symptoms of SOB and fatigue. Dobtamine stress ECHO 4/1/23 demonstrated severe AS with peak velocity 4.09m/s; MPG 43mmHg at 10mcg dobutamine. Subsequently s/p TAVR 5/7/24 Successful deployment 29 mm (+1 cc) Brenner Theodore 3 Ultra-Resilia bioprosthetic aortic valve with PV of 1.2 m/s, MPG of 3 mmHg, Trivial paravalvular leak noted. Echo 5/31/24 EF=50%, TAVR well seated, moderate MR, left atrium, mildly dilated. Device check 3/30/25 RV PACING 97.97%, KEYSHA 8.00YRS.   Today, he is here with his wife. He is doing well overall from a cardiac standpoint and has no difficulties doing his daily activities but c/o lower leg edema. He fell two weeks ago over at tree stump and thinks this is contributing to his swelling. Note ran out of lasix for 2-3 weeks and just started back.  His wife reports he eats to much salt and doesn't restrict fluids. Saw TANYA Edouard and she increased lasix

## 2025-06-17 ENCOUNTER — CLINICAL SUPPORT (OUTPATIENT)
Dept: CARDIOLOGY CLINIC | Age: 85
End: 2025-06-17

## 2025-06-18 ENCOUNTER — HOSPITAL ENCOUNTER (OUTPATIENT)
Dept: OCCUPATIONAL THERAPY | Age: 85
Setting detail: THERAPIES SERIES
Discharge: HOME | End: 2025-06-18
Payer: MEDICARE

## 2025-06-18 DIAGNOSIS — E78.2 MIXED HYPERLIPIDEMIA: ICD-10-CM

## 2025-06-18 DIAGNOSIS — M25.642 STIFFNESS OF LEFT HAND JOINT: Primary | ICD-10-CM

## 2025-06-18 PROCEDURE — 97165 OT EVAL LOW COMPLEX 30 MIN: CPT | Performed by: OCCUPATIONAL THERAPIST

## 2025-06-18 PROCEDURE — 97018 PARAFFIN BATH THERAPY: CPT | Performed by: OCCUPATIONAL THERAPIST

## 2025-06-18 PROCEDURE — 97110 THERAPEUTIC EXERCISES: CPT | Performed by: OCCUPATIONAL THERAPIST

## 2025-06-18 RX ORDER — ATORVASTATIN CALCIUM 40 MG/1
TABLET, FILM COATED ORAL
Qty: 90 TABLET | Refills: 1 | Status: SHIPPED | OUTPATIENT
Start: 2025-06-18

## 2025-06-18 NOTE — TELEPHONE ENCOUNTER
Last Office Visit: 7/23/2024 Provider: PAULINE  **Is provider OOT? No    Next Office Visit: 6/26/2025 Provider: PAULINE      LAST LABS:   CBC:  Lab Results   Component Value Date    WBC 7.2 03/03/2025    HGB 12.9 (L) 03/03/2025    HCT 38.2 (L) 03/03/2025    MCV 85.7 03/03/2025     03/03/2025    LYMPHOPCT 22.0 03/03/2025    RBC 4.46 03/03/2025    MCH 28.9 03/03/2025    MCHC 33.7 03/03/2025    RDW 15.7 (H) 03/03/2025           CMP:  Lab Results   Component Value Date     (L) 03/03/2025    K 3.8 03/03/2025    CL 96 (L) 03/03/2025    CO2 29 03/03/2025    BUN 16 03/03/2025    CREATININE 0.7 (L) 03/03/2025    GLUCOSE 90 03/03/2025    CALCIUM 9.9 03/03/2025    BILITOT 1.8 (H) 03/03/2025    ALKPHOS 170 (H) 03/03/2025    AST 25 03/03/2025    ALT 39 03/03/2025    LABGLOM >90 03/03/2025    GFRAA >60 09/02/2022    AGRATIO 1.9 03/03/2025    GLOB 3.0 03/11/2021         Lipid:  Lab Results   Component Value Date    CHOL 147 03/03/2025    TRIG 78 03/03/2025    HDL 48 03/03/2025    LDL 83 03/03/2025    VLDL 16 03/03/2025

## 2025-06-18 NOTE — TELEPHONE ENCOUNTER
Pt called requesting refills for apixaban (ELIQUIS) 5 MG TABS tablet and   atorvastatin (LIPITOR) 40 MG tablet    Please send to   [facility] - [address] [facility] - P [phone] - F [phone]  [facility] [place] [address]  Phone: [phone]  Fax: [phone]

## 2025-06-18 NOTE — PLAN OF CARE
[facility] - Outpatient Rehabilitation and Therapy: [address], [place] [address] office: [phone] fax: [phone]     Occupational Therapy Initial Evaluation Certification      Dear Tammy Coleman MD,    We had the pleasure of evaluating the following patient for occupational therapy services at [facility] Outpatient Occupational Therapy.  A summary of our findings can be found in the initial assessment below.  This includes our plan of care.  If you have any questions or concerns regarding these findings, please do not hesitate to contact me at the office phone number listed above.  Thank you for the referral.     Physician Signature:_______________________________Date:__________________  By signing above (or electronic signature), therapist’s plan is approved by physician       Occupational Therapy: TREATMENT/PROGRESS NOTE   Patient: Anup Del Cid (84 y.o. male)   Examination Date: 2025   :  1940 MRN: [id number]   Visit #: 1  Insurance Allowable Auth Needed    []Yes    []No    Insurance: Payor: [employer] MEDICARE / Plan: AETNA MEDICARE-ADVANTAGE PPO / Product Type: Medicare /   Insurance ID: [id number] - (Medicare Managed)  Secondary Insurance (if applicable):    Treatment Diagnosis:     ICD-10-CM    1. Stiffness of left hand joint  M25.642          Medical Diagnosis:  Trigger ring finger of left hand [M65.342]   Referring Physician: Tammy Coleman MD  PCP: Camilla Rios DO     Plan of care signed (Y/N): sent on evaluation    Date of Patient follow up with Physician:      Plan of Care Report: EVAL today  POC update due: (10 visits /OR AUTH LIMITS, whichever is less)                                             Medical History:  Date of Onset:progressive onset over the last few months, had cortisone injection a couple of weeks ago   Date of Surgery:  Comorbidities:  None  Relevant Medical History:                                          Precautions/

## 2025-06-18 NOTE — PROGRESS NOTES
[facility]   Electrophysiology Outpatient Note              Date:  June 19, 2025  Patient name: Anup Del Cid  YOB: 1940    Primary Care physician: Camilla Rios DO    HISTORY OF PRESENT ILLNESS: The patient is a 84 y.o.  male with a history of persistent atrial fibrillation, bradycardia-MICRA PPM, hypertension, severe aortic stenosis, hyperlipidemia, diabetes,     Patient is followed by  in EP clinic.  History of persistent atrial fibrillation with bradycardia. Patient wore cardiac event monitor 09/28/2023 to 10/12/2023-predominately AF average HR of 46 (). PVC burden 3.60%.   Patient had Micra implanted 12/19/2023    3/25/2024 he was seen for persistent atrial fibrillation and management of Micra.  Remote device check on 3/20/2024 revealed VVIR mode,  95.7%, VS 4.3%. ECG shows Paced with a HR of 63. Patient is accompanied by his wife today.  Patient denies chest pain and palpitations.  States he gets short of breath with activity such as going up the stairs.  He will have some dizziness if he bends down and stands back up.  He is scheduled for cardiac catheterization 4/1/2024 for evaluation of TAVR versus SAVR.  He recently saw Dr. Santana in the office on 3/18/2024 and was noted to have lower extremity edema.  He was started on Lasix daily.  Patient states he is lost a great deal of weight since starting it.  He still has lower extremity edema today.  Discussed low-sodium diet.      5/7/2024 Patient underwent TAVR with Dr. Mccormick at [facility].  Follow-up echo on 5/31/2024 reveals TAVR valve well-seated    9/23/2020 he was seen for persistent atrial fibrillation and management of Micra.  ECG reveals Wide complex rate 61. He is accompanied by his wife today.  Patient states he has been doing very well.  He is status post TAVR from 5/2024.  He is now able to walk up a slight incline.  He he recently was at a high school football game and did

## 2025-06-19 ENCOUNTER — CLINICAL SUPPORT (OUTPATIENT)
Dept: CARDIOLOGY CLINIC | Age: 85
End: 2025-06-19

## 2025-06-19 ENCOUNTER — OFFICE VISIT (OUTPATIENT)
Dept: CARDIOLOGY CLINIC | Age: 85
End: 2025-06-19
Payer: MEDICARE

## 2025-06-19 VITALS
OXYGEN SATURATION: 97 % | HEIGHT: 70 IN | WEIGHT: 230.2 LBS | BODY MASS INDEX: 32.96 KG/M2 | DIASTOLIC BLOOD PRESSURE: 42 MMHG | HEART RATE: 61 BPM | SYSTOLIC BLOOD PRESSURE: 114 MMHG

## 2025-06-19 DIAGNOSIS — I44.30 AV BLOCK: ICD-10-CM

## 2025-06-19 DIAGNOSIS — I48.91 ATRIAL FIBRILLATION, UNSPECIFIED TYPE (HCC): ICD-10-CM

## 2025-06-19 DIAGNOSIS — R00.1 BRADYCARDIA: ICD-10-CM

## 2025-06-19 DIAGNOSIS — I35.0 MODERATE AORTIC STENOSIS: ICD-10-CM

## 2025-06-19 DIAGNOSIS — Z95.0 PACEMAKER: Primary | ICD-10-CM

## 2025-06-19 DIAGNOSIS — I48.19 PERSISTENT ATRIAL FIBRILLATION (HCC): ICD-10-CM

## 2025-06-19 DIAGNOSIS — I48.21 PERMANENT ATRIAL FIBRILLATION (HCC): Primary | ICD-10-CM

## 2025-06-19 DIAGNOSIS — Z95.2 S/P TAVR (TRANSCATHETER AORTIC VALVE REPLACEMENT): ICD-10-CM

## 2025-06-19 DIAGNOSIS — Z79.899 MEDICATION MANAGEMENT: ICD-10-CM

## 2025-06-19 DIAGNOSIS — Z95.0 S/P PLACEMENT OF LEADLESS CARDIAC PACEMAKER: ICD-10-CM

## 2025-06-19 PROCEDURE — 1159F MED LIST DOCD IN RCRD: CPT

## 2025-06-19 PROCEDURE — 99214 OFFICE O/P EST MOD 30 MIN: CPT

## 2025-06-19 PROCEDURE — G2211 COMPLEX E/M VISIT ADD ON: HCPCS

## 2025-06-19 PROCEDURE — 93000 ELECTROCARDIOGRAM COMPLETE: CPT

## 2025-06-19 PROCEDURE — 3074F SYST BP LT 130 MM HG: CPT

## 2025-06-19 PROCEDURE — 1123F ACP DISCUSS/DSCN MKR DOCD: CPT

## 2025-06-19 PROCEDURE — 1160F RVW MEDS BY RX/DR IN RCRD: CPT

## 2025-06-19 PROCEDURE — 3078F DIAST BP <80 MM HG: CPT

## 2025-06-19 NOTE — PATIENT INSTRUCTIONS
Continue Eliquis 5 mg twice daily for stroke risk reduction  Continue Coreg 12.5 mg twice daily with meals  Continue Plavix 75 mg daily  Continue lisinopril 10 mg daily  Continue Glucophage 500 mg twice daily  Continue Lipitor 40 mg daily  Continue Lasix 40 mg daily--double it tomorrow and Saturday  Daily weights please  Low sodium diet  Elevate legs when sitting    Continue with every 3-month remote device checks    Follow up in 9 months Leann CRYSTAL

## 2025-06-23 DIAGNOSIS — E78.2 MIXED HYPERLIPIDEMIA: ICD-10-CM

## 2025-06-23 RX ORDER — ATORVASTATIN CALCIUM 40 MG/1
TABLET, FILM COATED ORAL
Qty: 90 TABLET | Refills: 1 | Status: SHIPPED | OUTPATIENT
Start: 2025-06-23

## 2025-06-23 NOTE — PROGRESS NOTES
Patient called in, medications were sent to the wrong pharmacy. Pharmacy changed and medication sent.

## 2025-06-25 ENCOUNTER — HOSPITAL ENCOUNTER (OUTPATIENT)
Dept: OCCUPATIONAL THERAPY | Age: 85
Setting detail: THERAPIES SERIES
Discharge: HOME | End: 2025-06-25
Payer: MEDICARE

## 2025-06-25 PROCEDURE — 97110 THERAPEUTIC EXERCISES: CPT | Performed by: OCCUPATIONAL THERAPIST

## 2025-06-25 PROCEDURE — 97140 MANUAL THERAPY 1/> REGIONS: CPT | Performed by: OCCUPATIONAL THERAPIST

## 2025-06-25 PROCEDURE — 97018 PARAFFIN BATH THERAPY: CPT | Performed by: OCCUPATIONAL THERAPIST

## 2025-06-25 NOTE — FLOWSHEET NOTE
[facility] - Outpatient Rehabilitation and Therapy: [address], [place] [address] office: [phone] fax: [phone]     Occupational Therapy Initial Evaluation Certification      Dear Tammy Coleman MD,    We had the pleasure of evaluating the following patient for occupational therapy services at [facility] Out[facility] Occupational Therapy.  A summary of our findings can be found in the initial assessment below.  This includes our plan of care.  If you have any questions or concerns regarding these findings, please do not hesitate to contact me at the office phone number listed above.  Thank you for the referral.     Physician Signature:_______________________________Date:__________________  By signing above (or electronic signature), therapist’s plan is approved by physician       Occupational Therapy: TREATMENT/PROGRESS NOTE   Patient: Anup Del Cid (84 y.o. male)   RICH  Examination Date: 2025   :  1940 MRN: [id number]   Visit #: 2  Insurance Allowable Auth Needed    []Yes    []No    Insurance: Payor: AETNA MEDICARE / Plan: AETNA MEDICARE-ADVANTAGE PPO / Product Type: Medicare /   Insurance ID: [id number] - (Medicare Managed)  Secondary Insurance (if applicable):    Treatment Diagnosis:     ICD-10-CM    1. Stiffness of left hand joint  M25.642          Medical Diagnosis:  Trigger ring finger of left hand [M65.342]   Referring Physician: Tammy Coleman MD  PCP: Camilla Rios DO     Plan of care signed (Y/N): sent on evaluation    Date of Patient follow up with Physician:      Plan of Care Report:   POC update due: (10 visits /OR AUTH LIMITS, whichever is less)                                             Medical History:  Date of Onset:progressive onset over the last few months, had cortisone injection a couple of weeks ago   Date of Surgery:  Comorbidities:  None  Relevant Medical History:                                          Precautions/

## 2025-06-26 ENCOUNTER — TELEPHONE (OUTPATIENT)
Dept: CARDIOLOGY CLINIC | Age: 85
End: 2025-06-26

## 2025-06-26 ENCOUNTER — OFFICE VISIT (OUTPATIENT)
Dept: CARDIOLOGY CLINIC | Age: 85
End: 2025-06-26
Payer: MEDICARE

## 2025-06-26 VITALS
BODY MASS INDEX: 32.78 KG/M2 | WEIGHT: 229 LBS | HEART RATE: 79 BPM | HEIGHT: 70 IN | SYSTOLIC BLOOD PRESSURE: 124 MMHG | DIASTOLIC BLOOD PRESSURE: 60 MMHG | OXYGEN SATURATION: 96 %

## 2025-06-26 DIAGNOSIS — E11.9 TYPE 2 DIABETES MELLITUS WITHOUT COMPLICATION, WITHOUT LONG-TERM CURRENT USE OF INSULIN (HCC): ICD-10-CM

## 2025-06-26 DIAGNOSIS — E78.2 MIXED HYPERLIPIDEMIA: ICD-10-CM

## 2025-06-26 DIAGNOSIS — I10 ESSENTIAL HYPERTENSION: ICD-10-CM

## 2025-06-26 DIAGNOSIS — Z79.899 MEDICATION MANAGEMENT: ICD-10-CM

## 2025-06-26 DIAGNOSIS — R60.0 LOWER EXTREMITY EDEMA: ICD-10-CM

## 2025-06-26 DIAGNOSIS — I48.19 PERSISTENT ATRIAL FIBRILLATION (HCC): Primary | ICD-10-CM

## 2025-06-26 PROCEDURE — 3074F SYST BP LT 130 MM HG: CPT | Performed by: INTERNAL MEDICINE

## 2025-06-26 PROCEDURE — 3078F DIAST BP <80 MM HG: CPT | Performed by: INTERNAL MEDICINE

## 2025-06-26 PROCEDURE — 3044F HG A1C LEVEL LT 7.0%: CPT | Performed by: INTERNAL MEDICINE

## 2025-06-26 PROCEDURE — 99214 OFFICE O/P EST MOD 30 MIN: CPT | Performed by: INTERNAL MEDICINE

## 2025-06-26 PROCEDURE — 1159F MED LIST DOCD IN RCRD: CPT | Performed by: INTERNAL MEDICINE

## 2025-06-26 PROCEDURE — 1123F ACP DISCUSS/DSCN MKR DOCD: CPT | Performed by: INTERNAL MEDICINE

## 2025-06-26 RX ORDER — CLOPIDOGREL BISULFATE 75 MG/1
75 TABLET ORAL DAILY
Qty: 90 TABLET | Refills: 3 | Status: SHIPPED | OUTPATIENT
Start: 2025-06-26 | End: 2025-06-26

## 2025-06-26 RX ORDER — CARVEDILOL 12.5 MG/1
12.5 TABLET ORAL 2 TIMES DAILY WITH MEALS
Qty: 180 TABLET | Refills: 3 | Status: SHIPPED | OUTPATIENT
Start: 2025-06-26

## 2025-06-26 RX ORDER — ASPIRIN 81 MG/1
81 TABLET ORAL DAILY
Qty: 90 TABLET | Refills: 3 | Status: SHIPPED | OUTPATIENT
Start: 2025-06-26

## 2025-06-26 RX ORDER — FUROSEMIDE 40 MG/1
40 TABLET ORAL 2 TIMES DAILY
Qty: 180 TABLET | Refills: 3 | Status: SHIPPED | OUTPATIENT
Start: 2025-06-26

## 2025-06-26 NOTE — TELEPHONE ENCOUNTER
Per smm he was reviewing the chart he wants patient to stop plavix and start taking aspirin 81mg daily.     Patient informed. Verbalized understanding.

## 2025-06-26 NOTE — TELEPHONE ENCOUNTER
----- Message from Dr. Ramirze Santana MD sent at 6/26/2025  2:38 PM EDT -----  Regarding: FW: Labs  Please call Mr. Del Cid and let him know that BMP lab ordered is NON-fasting. Thanks.  Dr. Santana  ----- Message -----  From: Yancy Alex  Sent: 6/26/2025  12:45 PM EDT  To: Ramirez Santana MD  Subject: Labs                                             Good afternoon,     Your patient called in to schedule his Echo, which is currently scheduled however he wasn't sure if his labs were fasting or not, could you reach out to him to let him know as he would like to do them when he's at the hospital for a future appointment, as I advised him it is a walk in service.     Thank you,    Yancy Ch Central Scheduling

## 2025-06-26 NOTE — PATIENT INSTRUCTIONS
Labs reviewed in epic and discussed with patient.  Medications reviewed in office. Medications refilled as warranted  You will need to take clindamycin 1 hour prior to any dental work or routine cleaning to protect your valve from infection.  Recommend obtaining knee high compression stockings from [facility], [address] [place] [address]. We recommend 20-30 mmHg. Wear these during the day and take off at night. Would also recommend elevating feet when sitting to help mobilize fluid.     Limit your salt intake to 2g a day.   Limit your fluid intake to 64oz (2 liters) daily.    Reschedule echo   Increase lasix to 40mg twice a day.   Please obtain the following labs in 10 day; -BMP

## 2025-06-27 ENCOUNTER — TELEPHONE (OUTPATIENT)
Dept: CARDIOLOGY CLINIC | Age: 85
End: 2025-06-27

## 2025-06-27 ENCOUNTER — HOSPITAL ENCOUNTER (OUTPATIENT)
Dept: OCCUPATIONAL THERAPY | Age: 85
Setting detail: THERAPIES SERIES
Discharge: HOME | End: 2025-06-27
Payer: MEDICARE

## 2025-06-27 PROCEDURE — 97018 PARAFFIN BATH THERAPY: CPT | Performed by: OCCUPATIONAL THERAPIST

## 2025-06-27 PROCEDURE — 97140 MANUAL THERAPY 1/> REGIONS: CPT | Performed by: OCCUPATIONAL THERAPIST

## 2025-06-27 PROCEDURE — 97110 THERAPEUTIC EXERCISES: CPT | Performed by: OCCUPATIONAL THERAPIST

## 2025-06-27 NOTE — TELEPHONE ENCOUNTER
Medication Question/Concern    What is the name of the medication you need to speak with someone about?  atorvastatin   Dosage of the medication:  40 mg  How are you taking this medication:  QD  What issues/concerns are you having with this medication:  Medication called into wrong pharmacy.  Please send to   [id number] - [place], [place] - [address] - P [phone] - F [phone]  [address], [facility] [address]  Phone: [phone]  Fax: [phone]     [facility] 6.26.25 [facility]

## 2025-06-27 NOTE — FLOWSHEET NOTE
outpatient therapy services to address the deficits outlined in the patients goals      Return to Play: NA    Prognosis for POC: [x] Good [] Fair  [] Poor    Patient requires continued skilled intervention: [x] Yes  [] No      CHARGE CAPTURE     OT CHARGE GRID   CPT Code (TIMED) minutes # CPT Code (UNTIMED) #     Therex (61481)  15 1  EVAL:LOW (32563 - Typically 30 minutes face-to-face)     Neuromusc. Re-ed (88298)    Re-Eval (83926)     Manual (75704) 8 1  Estim Unattended (95689)     Ther. Act (52792)    Parraffin (02036) 1    Gait (14778)    Fluidotherapy (86037)     ADL Training (35044)    Dry Needle 1-2 muscle (54105)     Iontophoresis (61361)    Dry Needle 3+ muscle (53303)     Ultrasound (69695)    VASO (57263)     Estim Attended (11590)    Group Therapy (52838)     Other:    Other:    Total Timed Code Tx Minutes 23 2  1     L Code: LMB     Total Treatment Minutes 33        Charge Justification:  (37337) THERAPEUTIC EXERCISE - Provided verbal/tactile cueing for HEP and/or activities related to strengthening, flexibility, endurance, ROM performed to prevent loss of range of motion, maintain or improve muscular strength or increase flexibility, following either an injury or surgery.   (80176) NEUROMUSCULAR RE-EDUCATION - Provided therapeutic procedure on activities related to neuromuscular reeducation of movement, balance, coordination, kinesthetic sense, posture, and/or proprioception for sitting and/or standing activities. Provided HEP review and/or progression.      GOALS     Patient stated goal:   [] Progressing: [] Met: [] Not Met: [] Adjusted    Therapist goals for Patient:   Short Term Goals: To be achieved in 2 weeks  1. Independent in HEP and progression per patient tolerance to progress toward goals.   [] Progressing: [x] Met: [] Not Met: [] Adjusted  2. Patient will have a decrease in pain to facilitate improvement in movement, function, and ADLs as indicated by Functional Deficits.    []

## 2025-06-27 NOTE — TELEPHONE ENCOUNTER
Lab Results   Component Value Date    CHOL 147 03/03/2025    TRIG 78 03/03/2025    HDL 48 03/03/2025    LDL 83 03/03/2025    VLDL 16 03/03/2025   This was already called in 6/23/25

## 2025-06-30 DIAGNOSIS — Z79.899 MEDICATION MANAGEMENT: ICD-10-CM

## 2025-06-30 RX ORDER — CLOPIDOGREL BISULFATE 75 MG/1
75 TABLET ORAL DAILY
Qty: 90 TABLET | Refills: 3 | Status: SHIPPED | OUTPATIENT
Start: 2025-06-30

## 2025-07-01 ENCOUNTER — RESULTS FOLLOW-UP (OUTPATIENT)
Dept: CARDIOLOGY CLINIC | Age: 85
End: 2025-07-01

## 2025-07-01 LAB
ANION GAP SERPL CALCULATED.3IONS-SCNC: 12 MMOL/L (ref 3–16)
BUN SERPL-MCNC: 18 MG/DL (ref 7–20)
CALCIUM SERPL-MCNC: 10 MG/DL (ref 8.3–10.6)
CHLORIDE SERPL-SCNC: 98 MMOL/L (ref 99–110)
CO2 SERPL-SCNC: 28 MMOL/L (ref 21–32)
CREAT SERPL-MCNC: 0.7 MG/DL (ref 0.8–1.3)
GFR SERPLBLD CREATININE-BSD FMLA CKD-EPI: >90 ML/MIN/{1.73_M2}
GLUCOSE SERPL-MCNC: 122 MG/DL (ref 70–99)
POTASSIUM SERPL-SCNC: 4.1 MMOL/L (ref 3.5–5.1)
SODIUM SERPL-SCNC: 138 MMOL/L (ref 136–145)

## 2025-07-02 ENCOUNTER — APPOINTMENT (OUTPATIENT)
Dept: OCCUPATIONAL THERAPY | Age: 85
End: 2025-07-02
Payer: MEDICARE

## 2025-07-07 ENCOUNTER — HOSPITAL ENCOUNTER (OUTPATIENT)
Dept: OCCUPATIONAL THERAPY | Age: 85
Setting detail: THERAPIES SERIES
Discharge: HOME | End: 2025-07-07
Payer: MEDICARE

## 2025-07-07 PROCEDURE — 97110 THERAPEUTIC EXERCISES: CPT | Performed by: OCCUPATIONAL THERAPIST

## 2025-07-07 PROCEDURE — 97140 MANUAL THERAPY 1/> REGIONS: CPT | Performed by: OCCUPATIONAL THERAPIST

## 2025-07-07 NOTE — FLOWSHEET NOTE
[facility] - Outpatient Rehabilitation and Therapy: [address], [place] [address] office: [phone] fax: [phone]     Occupational Therapy Initial Evaluation Certification      Dear Tammy Coleman MD,    We had the pleasure of evaluating the following patient for occupational therapy services at [facility] Outpatient Occupational Therapy.  A summary of our findings can be found in the initial assessment below.  This includes our plan of care.  If you have any questions or concerns regarding these findings, please do not hesitate to contact me at the office phone number listed above.  Thank you for the referral.     Physician Signature:_______________________________Date:__________________  By signing above (or electronic signature), therapist’s plan is approved by physician       Occupational Therapy: TREATMENT/PROGRESS NOTE   Patient: Anup Del Cid (84 y.o. male)   RICH  Examination Date: 2025   :  1940 MRN: [id number]   Visit #: 4  Insurance Allowable Auth Needed    []Yes    []No    Insurance: Payor: AETNA MEDICARE / Plan: AETNA MEDICARE-ADVANTAGE PPO / Product Type: Medicare /   Insurance ID: [id number] - (Medicare Managed)  Secondary Insurance (if applicable):    Treatment Diagnosis:     ICD-10-CM    1. Stiffness of left hand joint  M25.642          Medical Diagnosis:  Trigger ring finger of left hand [M65.342]   Referring Physician: Tammy Coleman MD  PCP: Camilla Rios DO     Plan of care signed (Y/N): sent on evaluation    Date of Patient follow up with Physician:      Plan of Care Report:   POC update due: (10 visits /OR AUTH LIMITS, whichever is less)                                             Medical History:  Date of Onset:progressive onset over the last few months, had cortisone injection a couple of weeks ago   Date of Surgery:  Comorbidities:  None  Relevant Medical History:                                          Precautions/

## 2025-07-11 ENCOUNTER — APPOINTMENT (OUTPATIENT)
Dept: OCCUPATIONAL THERAPY | Age: 85
End: 2025-07-11
Payer: MEDICARE

## 2025-07-17 ENCOUNTER — APPOINTMENT (OUTPATIENT)
Dept: OCCUPATIONAL THERAPY | Age: 85
End: 2025-07-17
Payer: MEDICARE

## 2025-07-17 ENCOUNTER — TELEPHONE (OUTPATIENT)
Dept: FAMILY MEDICINE CLINIC | Age: 85
End: 2025-07-17

## 2025-07-23 ENCOUNTER — OFFICE VISIT (OUTPATIENT)
Dept: ORTHOPEDIC SURGERY | Age: 85
End: 2025-07-23
Payer: MEDICARE

## 2025-07-23 VITALS — HEIGHT: 70 IN | WEIGHT: 229 LBS | BODY MASS INDEX: 32.78 KG/M2

## 2025-07-23 DIAGNOSIS — M65.342 TRIGGER RING FINGER OF LEFT HAND: Primary | ICD-10-CM

## 2025-07-23 PROCEDURE — 1159F MED LIST DOCD IN RCRD: CPT | Performed by: STUDENT IN AN ORGANIZED HEALTH CARE EDUCATION/TRAINING PROGRAM

## 2025-07-23 PROCEDURE — 99212 OFFICE O/P EST SF 10 MIN: CPT | Performed by: STUDENT IN AN ORGANIZED HEALTH CARE EDUCATION/TRAINING PROGRAM

## 2025-07-23 PROCEDURE — 1123F ACP DISCUSS/DSCN MKR DOCD: CPT | Performed by: STUDENT IN AN ORGANIZED HEALTH CARE EDUCATION/TRAINING PROGRAM

## 2025-07-23 NOTE — PROGRESS NOTES
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  He has not had any more triggering.  He has been going to occupational therapy.  He continues to report stiffness of his ring finger PIP joint.        History-  The patient is an 84-year-old right-hand dominant male presenting with symptoms of right ring finger triggering.  The onset of symptoms occurred approximately three weeks ago without any identifiable precipitating event. He retains partial mobility of the affected finger but experiences pain upon full flexion, with the finger locking in a flexed position. The patient reports no prior issues with this finger. The pain is described as consistent throughout the day. The patient has no history of diabetes mellitus and is currently on Eliquis for cardiac management.    MEDICATIONS  Eliquis     Occupation: Retired                                                                                        Current Outpatient Medications   Medication Instructions    apixaban (ELIQUIS) 5 MG TABS tablet TAKE 1 TABLET TWICE A DAY    aspirin 81 mg, Oral, DAILY    atorvastatin (LIPITOR) 40 MG tablet TAKE 1 TABLET BY MOUTH EVERY DAY    carvedilol (COREG) 12.5 mg, Oral, 2 TIMES DAILY WITH MEALS    clopidogrel (PLAVIX) 75 mg, Oral, DAILY    furosemide (LASIX) 40 mg, Oral, 2 TIMES DAILY    lisinopril (PRINIVIL;ZESTRIL) 10 mg, Oral, DAILY    metFORMIN (GLUCOPHAGE) 500 mg, Oral, 2 TIMES DAILY WITH MEALS    Multiple Vitamins-Minerals (THERAPEUTIC MULTIVITAMIN-MINERALS) tablet 1 tablet, DAILY       Past Medical History:   Diagnosis Date    Arthritis     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

## 2025-07-24 ENCOUNTER — HOSPITAL ENCOUNTER (OUTPATIENT)
Dept: CARDIOLOGY | Age: 85
Discharge: HOME | End: 2025-07-26
Attending: INTERNAL MEDICINE
Payer: MEDICARE

## 2025-07-24 VITALS
BODY MASS INDEX: 32.78 KG/M2 | HEIGHT: 70 IN | SYSTOLIC BLOOD PRESSURE: 124 MMHG | DIASTOLIC BLOOD PRESSURE: 60 MMHG | WEIGHT: 229 LBS

## 2025-07-24 DIAGNOSIS — Z95.2 HISTORY OF TRANSCATHETER AORTIC VALVE REPLACEMENT (TAVR): ICD-10-CM

## 2025-07-24 PROCEDURE — 93306 TTE W/DOPPLER COMPLETE: CPT

## 2025-07-25 LAB
ECHO AO ASC DIAM: 3.8 CM
ECHO AO ASCENDING AORTA INDEX: 1.72 CM/M2
ECHO AO ROOT DIAM: 3.3 CM
ECHO AO ROOT INDEX: 1.49 CM/M2
ECHO AV AREA PEAK VELOCITY: 1.6 CM2
ECHO AV AREA VTI: 1.5 CM2
ECHO AV AREA/BSA PEAK VELOCITY: 0.7 CM2/M2
ECHO AV AREA/BSA VTI: 0.7 CM2/M2
ECHO AV CUSP MM: 2 CM
ECHO AV MEAN GRADIENT: 9 MMHG
ECHO AV MEAN VELOCITY: 1.4 M/S
ECHO AV PEAK GRADIENT: 16 MMHG
ECHO AV PEAK VELOCITY: 2 M/S
ECHO AV VELOCITY RATIO: 0.5
ECHO AV VTI: 42.9 CM
ECHO BSA: 2.26 M2
ECHO EST RA PRESSURE: 3 MMHG
ECHO LA AREA 2C: 26.9 CM2
ECHO LA AREA 4C: 25.8 CM2
ECHO LA DIAMETER INDEX: 2.04 CM/M2
ECHO LA DIAMETER: 4.5 CM
ECHO LA MAJOR AXIS: 6.5 CM
ECHO LA MINOR AXIS: 6.2 CM
ECHO LA TO AORTIC ROOT RATIO: 1.36
ECHO LA VOL BP: 87 ML (ref 18–58)
ECHO LA VOL MOD A2C: 95 ML (ref 18–58)
ECHO LA VOL MOD A4C: 76 ML (ref 18–58)
ECHO LA VOL/BSA BIPLANE: 39 ML/M2 (ref 16–34)
ECHO LA VOLUME INDEX MOD A2C: 43 ML/M2 (ref 16–34)
ECHO LA VOLUME INDEX MOD A4C: 34 ML/M2 (ref 16–34)
ECHO LV E' LATERAL VELOCITY: 15.2 CM/S
ECHO LV E' SEPTAL VELOCITY: 10.8 CM/S
ECHO LV EDV 3D: 214 ML
ECHO LV EDV INDEX 3D: 97 ML/M2
ECHO LV EF PHYSICIAN: 57 %
ECHO LV EJECTION FRACTION 3D: 56 %
ECHO LV ESV 3D: 95 ML
ECHO LV ESV INDEX 3D: 43 ML/M2
ECHO LV FRACTIONAL SHORTENING: 35 % (ref 28–44)
ECHO LV GLOBAL LONGITUDINAL STRAIN (GLS): -17.8 %
ECHO LV GLOBAL LONGITUDINAL STRAIN (GLS): -17.8 %
ECHO LV GLOBAL LONGITUDINAL STRAIN (GLS): -18.3 %
ECHO LV GLOBAL LONGITUDINAL STRAIN (GLS): -19.5 %
ECHO LV INTERNAL DIMENSION DIASTOLE INDEX: 2.31 CM/M2
ECHO LV INTERNAL DIMENSION DIASTOLIC: 5.1 CM (ref 4.2–5.9)
ECHO LV INTERNAL DIMENSION SYSTOLIC INDEX: 1.49 CM/M2
ECHO LV INTERNAL DIMENSION SYSTOLIC: 3.3 CM
ECHO LV ISOVOLUMETRIC RELAXATION TIME (IVRT): 71 MS
ECHO LV IVSD: 1.2 CM (ref 0.6–1)
ECHO LV MASS 2D: 227.4 G (ref 88–224)
ECHO LV MASS 3D INDEX: 67 G/M2
ECHO LV MASS 3D: 148 G
ECHO LV MASS INDEX 2D: 102.9 G/M2 (ref 49–115)
ECHO LV POSTERIOR WALL DIASTOLIC: 1.1 CM (ref 0.6–1)
ECHO LV RELATIVE WALL THICKNESS RATIO: 0.43
ECHO LVOT AREA: 3.1 CM2
ECHO LVOT AV VTI INDEX: 0.47
ECHO LVOT DIAM: 2 CM
ECHO LVOT MEAN GRADIENT: 2 MMHG
ECHO LVOT PEAK GRADIENT: 4 MMHG
ECHO LVOT PEAK VELOCITY: 1 M/S
ECHO LVOT STROKE VOLUME INDEX: 28.7 ML/M2
ECHO LVOT SV: 63.4 ML
ECHO LVOT VTI: 20.2 CM
ECHO MV AREA VTI: 2.3 CM2
ECHO MV E VELOCITY: 0.98 M/S
ECHO MV E/E' LATERAL: 6.45
ECHO MV E/E' RATIO (AVERAGED): 7.76
ECHO MV E/E' SEPTAL: 9.07
ECHO MV LVOT VTI INDEX: 1.37
ECHO MV MAX VELOCITY: 1.2 M/S
ECHO MV MEAN GRADIENT: 3 MMHG
ECHO MV MEAN VELOCITY: 0.7 M/S
ECHO MV PEAK GRADIENT: 6 MMHG
ECHO MV VTI: 27.7 CM
ECHO RA AREA 4C: 20.3 CM2
ECHO RA END SYSTOLIC VOLUME APICAL 4 CHAMBER INDEX BSA: 25 ML/M2
ECHO RA VOLUME: 56 ML
ECHO RIGHT VENTRICULAR SYSTOLIC PRESSURE (RVSP): 35 MMHG
ECHO RV FREE WALL PEAK S': 15.1 CM/S
ECHO RV TAPSE: 2.2 CM (ref 1.7–?)
ECHO TV REGURGITANT MAX VELOCITY: 2.85 M/S
ECHO TV REGURGITANT PEAK GRADIENT: 32 MMHG

## 2025-07-28 ENCOUNTER — TELEPHONE (OUTPATIENT)
Dept: ORTHOPEDIC SURGERY | Age: 85
End: 2025-07-28

## 2025-07-28 ENCOUNTER — OFFICE VISIT (OUTPATIENT)
Dept: ORTHOPEDIC SURGERY | Age: 85
End: 2025-07-28
Payer: MEDICARE

## 2025-07-28 VITALS — HEIGHT: 70 IN | WEIGHT: 229 LBS | BODY MASS INDEX: 32.78 KG/M2

## 2025-07-28 DIAGNOSIS — M25.532 LEFT WRIST PAIN: Primary | ICD-10-CM

## 2025-07-28 PROCEDURE — 1159F MED LIST DOCD IN RCRD: CPT | Performed by: STUDENT IN AN ORGANIZED HEALTH CARE EDUCATION/TRAINING PROGRAM

## 2025-07-28 PROCEDURE — 99214 OFFICE O/P EST MOD 30 MIN: CPT | Performed by: STUDENT IN AN ORGANIZED HEALTH CARE EDUCATION/TRAINING PROGRAM

## 2025-07-28 PROCEDURE — L3908 WHO COCK-UP NONMOLDE PRE OTS: HCPCS | Performed by: STUDENT IN AN ORGANIZED HEALTH CARE EDUCATION/TRAINING PROGRAM

## 2025-07-28 PROCEDURE — 1123F ACP DISCUSS/DSCN MKR DOCD: CPT | Performed by: STUDENT IN AN ORGANIZED HEALTH CARE EDUCATION/TRAINING PROGRAM

## 2025-07-28 PROCEDURE — 1125F AMNT PAIN NOTED PAIN PRSNT: CPT | Performed by: STUDENT IN AN ORGANIZED HEALTH CARE EDUCATION/TRAINING PROGRAM

## 2025-07-28 RX ORDER — PREDNISONE 5 MG/1
TABLET ORAL
Qty: 45 TABLET | Refills: 0 | Status: SHIPPED | OUTPATIENT
Start: 2025-07-28

## 2025-07-28 NOTE — PROGRESS NOTES
Hand, Upper Extremity and Reconstructive Surgery                Tammy Coleman MD                                         Steroid injection-left ring finger trigger finger injection (2/26/2025, 4/9/25)    History of Present Illness  Update 7/28/2025-patient presents for new left wrist and hand swelling.  The swelling developed over the weekend.  Denies any trauma.  Denies any other infection in his hand.  Just started noticing increased swelling and redness and pain in his wrist with extension into his hand.  Patient denies known history of gout.        History-  The patient is an 84-year-old right-hand dominant male presenting with symptoms of right ring finger triggering.  The onset of symptoms occurred approximately three weeks ago without any identifiable precipitating event. He retains partial mobility of the affected finger but experiences pain upon full flexion, with the finger locking in a flexed position. The patient reports no prior issues with this finger. The pain is described as consistent throughout the day. The patient has no history of diabetes mellitus and is currently on Eliquis for cardiac management.    MEDICATIONS  Eliquis     Occupation: Retired                                                                                        Current Outpatient Medications   Medication Instructions    apixaban (ELIQUIS) 5 MG TABS tablet TAKE 1 TABLET TWICE A DAY    aspirin 81 mg, Oral, DAILY    atorvastatin (LIPITOR) 40 MG tablet TAKE 1 TABLET BY MOUTH EVERY DAY    carvedilol (COREG) 12.5 mg, Oral, 2 TIMES DAILY WITH MEALS    clopidogrel (PLAVIX) 75 mg, Oral, DAILY    furosemide (LASIX) 40 mg, Oral, 2 TIMES DAILY    lisinopril (PRINIVIL;ZESTRIL) 10 mg, Oral, DAILY    metFORMIN (GLUCOPHAGE) 500 mg, Oral, 2 TIMES DAILY WITH MEALS    Multiple Vitamins-Minerals (THERAPEUTIC MULTIVITAMIN-MINERALS) tablet 1 tablet, DAILY    predniSONE (DELTASONE) 5 MG tablet Take 4 pills for

## 2025-07-28 NOTE — TELEPHONE ENCOUNTER
General Question     Subject: Having redness and swelling going from the wrist to the hand. States warm to the touch and very painful.   Patient and /or Facility Request: Ximena (wife)   Contact Number: [phone]    Wife would like for him to be seen today if possible.

## 2025-08-01 ENCOUNTER — HOSPITAL ENCOUNTER (OUTPATIENT)
Dept: OCCUPATIONAL THERAPY | Age: 85
Setting detail: THERAPIES SERIES
Discharge: HOME | End: 2025-08-01
Payer: MEDICARE

## 2025-08-01 PROCEDURE — 97110 THERAPEUTIC EXERCISES: CPT | Performed by: OCCUPATIONAL THERAPIST

## 2025-08-01 PROCEDURE — 97140 MANUAL THERAPY 1/> REGIONS: CPT | Performed by: OCCUPATIONAL THERAPIST

## 2025-08-01 PROCEDURE — 97018 PARAFFIN BATH THERAPY: CPT | Performed by: OCCUPATIONAL THERAPIST

## 2025-08-01 NOTE — FLOWSHEET NOTE
[facility] - Outpatient Rehabilitation and Therapy: [address], [place] [address] office: [phone] fax: [phone]     Occupational Therapy Initial Evaluation Certification      Dear Tammy Coleman MD,    We had the pleasure of evaluating the following patient for occupational therapy services at [facility] Outpatient Occupational Therapy.  A summary of our findings can be found in the initial assessment below.  This includes our plan of care.  If you have any questions or concerns regarding these findings, please do not hesitate to contact me at the office phone number listed above.  Thank you for the referral.     Physician Signature:_______________________________Date:__________________  By signing above (or electronic signature), therapist’s plan is approved by physician       Occupational Therapy: TREATMENT/PROGRESS NOTE   Patient: Anup Del Cid (84 y.o. male)   RICH  Examination Date: 2025   :  1940 MRN: [id number]   Visit #: 5  Insurance Allowable Auth Needed    []Yes    []No    Insurance: Payor: AETNA MEDICARE / Plan: AETNA MEDICARE-ADVANTAGE PPO / Product Type: Medicare /   Insurance ID: [id number] - (Medicare Managed)  Secondary Insurance (if applicable):    Treatment Diagnosis:     ICD-10-CM    1. Stiffness of left hand joint  M25.642          Medical Diagnosis:  Trigger ring finger of left hand [M65.342]   Referring Physician: Tammy Coleman MD  PCP: Camilla Rios DO     Plan of care signed (Y/N): sent on evaluation    Date of Patient follow up with Physician:      Plan of Care Report:   POC update due: (10 visits /OR AUTH LIMITS, whichever is less)                                             Medical History:  Date of Onset:progressive onset over the last few months, had cortisone injection a couple of weeks ago   Date of Surgery:  Comorbidities:  None  Relevant Medical History:                                          Precautions/

## 2025-08-04 ENCOUNTER — HOSPITAL ENCOUNTER (OUTPATIENT)
Dept: OCCUPATIONAL THERAPY | Age: 85
Setting detail: THERAPIES SERIES
Discharge: HOME | End: 2025-08-04
Payer: MEDICARE

## 2025-08-04 PROCEDURE — 97018 PARAFFIN BATH THERAPY: CPT | Performed by: OCCUPATIONAL THERAPIST

## 2025-08-04 PROCEDURE — 97140 MANUAL THERAPY 1/> REGIONS: CPT | Performed by: OCCUPATIONAL THERAPIST

## 2025-08-04 PROCEDURE — 97110 THERAPEUTIC EXERCISES: CPT | Performed by: OCCUPATIONAL THERAPIST

## 2025-08-08 ENCOUNTER — APPOINTMENT (OUTPATIENT)
Dept: OCCUPATIONAL THERAPY | Age: 85
End: 2025-08-08
Payer: MEDICARE

## 2025-08-11 ENCOUNTER — HOSPITAL ENCOUNTER (OUTPATIENT)
Dept: OCCUPATIONAL THERAPY | Age: 85
Setting detail: THERAPIES SERIES
Discharge: HOME | End: 2025-08-11
Payer: MEDICARE

## 2025-08-11 PROCEDURE — 97110 THERAPEUTIC EXERCISES: CPT | Performed by: OCCUPATIONAL THERAPIST

## 2025-08-11 PROCEDURE — 97018 PARAFFIN BATH THERAPY: CPT | Performed by: OCCUPATIONAL THERAPIST

## 2025-08-12 ENCOUNTER — OFFICE VISIT (OUTPATIENT)
Dept: ORTHOPEDIC SURGERY | Age: 85
End: 2025-08-12

## 2025-08-12 VITALS — BODY MASS INDEX: 32.78 KG/M2 | HEIGHT: 70 IN | WEIGHT: 229 LBS

## 2025-08-12 DIAGNOSIS — M79.645 PAIN IN LEFT FINGER(S): Primary | ICD-10-CM

## 2025-08-12 RX ORDER — BETAMETHASONE SODIUM PHOSPHATE AND BETAMETHASONE ACETATE 3; 3 MG/ML; MG/ML
3 INJECTION, SUSPENSION INTRA-ARTICULAR; INTRALESIONAL; INTRAMUSCULAR; SOFT TISSUE ONCE
Status: COMPLETED | OUTPATIENT
Start: 2025-08-12 | End: 2025-08-12

## 2025-08-12 RX ORDER — LIDOCAINE HYDROCHLORIDE 10 MG/ML
0.5 INJECTION, SOLUTION INFILTRATION; PERINEURAL ONCE
Status: COMPLETED | OUTPATIENT
Start: 2025-08-12 | End: 2025-08-12

## 2025-08-12 RX ADMIN — LIDOCAINE HYDROCHLORIDE 0.5 ML: 10 INJECTION, SOLUTION INFILTRATION; PERINEURAL at 14:56

## 2025-08-12 RX ADMIN — BETAMETHASONE SODIUM PHOSPHATE AND BETAMETHASONE ACETATE 3 MG: 3; 3 INJECTION, SUSPENSION INTRA-ARTICULAR; INTRALESIONAL; INTRAMUSCULAR; SOFT TISSUE at 14:55

## 2025-08-15 ENCOUNTER — APPOINTMENT (OUTPATIENT)
Dept: OCCUPATIONAL THERAPY | Age: 85
End: 2025-08-15
Payer: MEDICARE

## 2025-08-18 ENCOUNTER — APPOINTMENT (OUTPATIENT)
Dept: OCCUPATIONAL THERAPY | Age: 85
End: 2025-08-18
Payer: MEDICARE

## 2025-08-20 ENCOUNTER — APPOINTMENT (OUTPATIENT)
Dept: OCCUPATIONAL THERAPY | Age: 85
End: 2025-08-20
Payer: MEDICARE

## 2025-09-04 ENCOUNTER — OFFICE VISIT (OUTPATIENT)
Dept: FAMILY MEDICINE CLINIC | Age: 85
End: 2025-09-04

## 2025-09-04 VITALS
WEIGHT: 218 LBS | SYSTOLIC BLOOD PRESSURE: 110 MMHG | HEART RATE: 61 BPM | OXYGEN SATURATION: 96 % | HEIGHT: 70 IN | BODY MASS INDEX: 31.21 KG/M2 | DIASTOLIC BLOOD PRESSURE: 34 MMHG

## 2025-09-04 DIAGNOSIS — I50.22 CHRONIC SYSTOLIC (CONGESTIVE) HEART FAILURE (HCC): ICD-10-CM

## 2025-09-04 DIAGNOSIS — E78.2 MIXED HYPERLIPIDEMIA: ICD-10-CM

## 2025-09-04 DIAGNOSIS — E11.9 TYPE 2 DIABETES MELLITUS WITHOUT COMPLICATION, WITHOUT LONG-TERM CURRENT USE OF INSULIN (HCC): Primary | ICD-10-CM

## 2025-09-04 DIAGNOSIS — I10 ESSENTIAL HYPERTENSION: ICD-10-CM

## 2025-09-04 LAB — HBA1C MFR BLD: 5.3 %

## 2025-09-04 ASSESSMENT — PATIENT HEALTH QUESTIONNAIRE - PHQ9: DEPRESSION UNABLE TO ASSESS: PT REFUSES

## (undated) DEVICE — 3M™ IOBAN™ 2 ANTIMICROBIAL INCISE DRAPE 6650EZ: Brand: IOBAN™ 2

## (undated) DEVICE — BAG BLD TRNSF 1 CPLR IV ST 600ML TERUFLEX

## (undated) DEVICE — SYSTEM SKIN CLSR 22CM DERMBND PRINEO

## (undated) DEVICE — GLOVE SURG SZ 8 L12IN FNGR THK79MIL GRN LTX FREE

## (undated) DEVICE — SYRINGE 30ML MEDICAL LEUR LOCK TIP WO

## (undated) DEVICE — HOOD, PEEL-AWAY: Brand: FLYTE

## (undated) DEVICE — LINER SUCTION

## (undated) DEVICE — NEEDLE SPNL 20GA L3.5IN YEL HUB S STL REG WALL FIT STYL W/

## (undated) DEVICE — AUTOTRANSFUSION BOWL SET 125 CC XTRA

## (undated) DEVICE — SUTURE PERMAHAND SZ 2-0 L30IN NONABSORBABLE BLK SILK W/O A305H

## (undated) DEVICE — SUTURE MCRYL SZ 3-0 L27IN ABSRB UD L19MM PS-2 3/8 CIR PRIM Y427H

## (undated) DEVICE — 3M™ STERI-DRAPE™ INCISE DRAPE 1050 (60CM X 45CM): Brand: STERI-DRAPE™

## (undated) DEVICE — PENCIL SMK EVAC ALL IN 1 DSGN ENH VISIBILITY IMPROVED AIR

## (undated) DEVICE — SUTURE VCRL SZ 2-0 L18IN ABSRB UD CT-1 L36MM 1/2 CIR J839D

## (undated) DEVICE — SOLUTION IRRIG 2000ML 0.9% SOD CHL USP UROMATIC PLAS CONT

## (undated) DEVICE — HOOD: Brand: FLYTE

## (undated) DEVICE — LIPIGUARD SB REINFUSION FILTER FOR SALVAGED BLOOD: Brand: LIPIGUARD SB

## (undated) DEVICE — SUTURE STRATAFIX SPRL SZ 1 L14IN ABSRB VLT L48CM CTX 1/2 SXPD2B405

## (undated) DEVICE — SYRINGE MED 30ML STD CLR PLAS LUERLOCK TIP N CTRL DISP

## (undated) DEVICE — GLOVE ORANGE PI 7 1/2   MSG9075

## (undated) DEVICE — DRAPE SURG UTIL 26X15 IN W/ TAPE N INVASIVE MULT LAYR DISP

## (undated) DEVICE — Device

## (undated) DEVICE — 1010 S-DRAPE TOWEL DRAPE 10/BX: Brand: STERI-DRAPE™

## (undated) DEVICE — BIPOLAR SEALER 23-112-1 AQM 6.0: Brand: AQUAMANTYS ®

## (undated) DEVICE — HANDPIECE SET WITH HIGH FLOW TIP AND SUCTION TUBE: Brand: INTERPULSE

## (undated) DEVICE — DRAPE C ARM W46XL120IN XLN